# Patient Record
Sex: FEMALE | Race: WHITE | NOT HISPANIC OR LATINO | Employment: UNEMPLOYED | URBAN - METROPOLITAN AREA
[De-identification: names, ages, dates, MRNs, and addresses within clinical notes are randomized per-mention and may not be internally consistent; named-entity substitution may affect disease eponyms.]

---

## 2017-01-13 ENCOUNTER — TELEPHONE (OUTPATIENT)
Dept: INTERNAL MEDICINE | Facility: CLINIC | Age: 72
End: 2017-01-13

## 2017-01-13 NOTE — TELEPHONE ENCOUNTER
----- Message from Ju Lawler sent at 1/13/2017  2:59 PM CST -----  _  1st Request  _  2nd Request  _  3rd Request        Who: xiomara merino    Why: pt is coming down with a bad cold and congestion. Pt is calling to see if you can call her in something for her cold, that she can take with her HBP.    [facility]   Telephone Fax   [phone]        What Number to Call Back: [phone]    When to Expect a call back: (Before the end of the day)   -- if call after 3:00 call back will be tomorrow.

## 2017-01-13 NOTE — TELEPHONE ENCOUNTER
Spoke with pt states she has congestion, cough and sore throat 3 days ago, also taking tylenol, slight low grade fever on Wednesday, sputum production, advised per Dr. Mary lewis to take robitussin dm or mucinex dm

## 2017-02-08 RX ORDER — FLUTICASONE PROPIONATE 50 MCG
SPRAY, SUSPENSION (ML) NASAL
Qty: 16 G | Refills: 3 | Status: SHIPPED | OUTPATIENT
Start: 2017-02-08 | End: 2018-05-05 | Stop reason: SDUPTHER

## 2017-03-21 ENCOUNTER — TELEPHONE (OUTPATIENT)
Dept: INTERNAL MEDICINE | Facility: CLINIC | Age: 72
End: 2017-03-21

## 2017-03-21 NOTE — TELEPHONE ENCOUNTER
Pt states she received a letter for Jury Duty. Pt states she is 71yrs old and has muscle issues. Pt states in the past, Dr. León authorized a letter to be excused. Pt would like PCP to write a letter to have her excused from Jury Duty. Pt states the request is due to muscle issues/anxiety and not being able to stay seated for long periods of time. Pt states she would need the letter approved this week and she would like the letter to mailed to her home on address. Please advise/authorize?

## 2017-03-21 NOTE — TELEPHONE ENCOUNTER
----- Message from Skip Choi sent at 3/21/2017 12:03 PM CDT -----  Contact: pt  x_ 1st Request   _ 2nd Request   _ 3rd Request     Who: EDUAR SEAY [[id number]]    Why: Pt missed your call is requesting a call back    What Number to Call Back: [phone]    When to Expect a call back: (Before the end of the day)   -- if call after 3:00 call back will be tomorrow.

## 2017-03-21 NOTE — TELEPHONE ENCOUNTER
----- Message from Joan Daniels sent at 3/21/2017 10:52 AM CDT -----  Contact: Self  X   1st Request  _  2nd Request  _  3rd Request        Who:  EDUAR SEAY [[id number]]    Why: Pt states she would like to speak to the clinical team. Pt states she would like to discuss the details with clinical staff. Please call, thanks!    What Number to Call Back: [phone]    When to Expect a call back: (Before the end of the day)   -- if the call is after 12:00, the call back will be tomorrow.

## 2017-03-21 NOTE — TELEPHONE ENCOUNTER
Pt informed of letter approval. Pt letter placed in mail as requested. Patient has no further questions or concerns.

## 2017-03-23 ENCOUNTER — PATIENT OUTREACH (OUTPATIENT)
Dept: ADMINISTRATIVE | Facility: HOSPITAL | Age: 72
End: 2017-03-23

## 2017-03-31 ENCOUNTER — PATIENT OUTREACH (OUTPATIENT)
Dept: ADMINISTRATIVE | Facility: HOSPITAL | Age: 72
End: 2017-03-31

## 2017-04-07 ENCOUNTER — OFFICE VISIT (OUTPATIENT)
Dept: INTERNAL MEDICINE | Facility: CLINIC | Age: 72
End: 2017-04-07
Payer: COMMERCIAL

## 2017-04-07 VITALS
HEIGHT: 66 IN | WEIGHT: 143.31 LBS | BODY MASS INDEX: 23.03 KG/M2 | DIASTOLIC BLOOD PRESSURE: 78 MMHG | SYSTOLIC BLOOD PRESSURE: 124 MMHG | HEART RATE: 71 BPM

## 2017-04-07 DIAGNOSIS — F41.1 GENERALIZED ANXIETY DISORDER: ICD-10-CM

## 2017-04-07 DIAGNOSIS — E78.2 MIXED HYPERLIPIDEMIA: Chronic | ICD-10-CM

## 2017-04-07 DIAGNOSIS — G61.81 CHRONIC INFLAMMATORY DEMYELINATING POLYNEUROPATHY: Chronic | ICD-10-CM

## 2017-04-07 DIAGNOSIS — K21.9 GASTROESOPHAGEAL REFLUX DISEASE WITHOUT ESOPHAGITIS: ICD-10-CM

## 2017-04-07 DIAGNOSIS — I10 ESSENTIAL HYPERTENSION: Primary | Chronic | ICD-10-CM

## 2017-04-07 PROCEDURE — 3074F SYST BP LT 130 MM HG: CPT | Mod: S$GLB,,, | Performed by: INTERNAL MEDICINE

## 2017-04-07 PROCEDURE — 1160F RVW MEDS BY RX/DR IN RCRD: CPT | Mod: S$GLB,,, | Performed by: INTERNAL MEDICINE

## 2017-04-07 PROCEDURE — 3078F DIAST BP <80 MM HG: CPT | Mod: S$GLB,,, | Performed by: INTERNAL MEDICINE

## 2017-04-07 PROCEDURE — 1159F MED LIST DOCD IN RCRD: CPT | Mod: S$GLB,,, | Performed by: INTERNAL MEDICINE

## 2017-04-07 PROCEDURE — 1157F ADVNC CARE PLAN IN RCRD: CPT | Mod: S$GLB,,, | Performed by: INTERNAL MEDICINE

## 2017-04-07 PROCEDURE — 1126F AMNT PAIN NOTED NONE PRSNT: CPT | Mod: S$GLB,,, | Performed by: INTERNAL MEDICINE

## 2017-04-07 PROCEDURE — 99214 OFFICE O/P EST MOD 30 MIN: CPT | Mod: S$GLB,,, | Performed by: INTERNAL MEDICINE

## 2017-04-07 PROCEDURE — 99999 PR PBB SHADOW E&M-EST. PATIENT-LVL III: CPT | Mod: PBBFAC,,, | Performed by: INTERNAL MEDICINE

## 2017-04-07 NOTE — PROGRESS NOTES
Subjective:       Patient ID: Cecilia Munguia is a 71 y.o. female.    Chief Complaint: Annual Exam    HPI Comments: Pt here for annual exam. She feels well and has no c/o currently. 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 She has atrophy in muscles of hands and feet due to this. She sees neuro regularly. She had exacerbation earlier this year and required IVIG which helped tremendously. She does not take meds for this otherwise. She does avoid vaccinations in fear of GBS or worsening of symptoms.     She has GERD that is well controlled with prilosec otc prn. No dysphagia, wt loss, changes in BM, abd pain, n/v.     Pt's BP is well controlled. Tolerating meds well. Pt denies cp/sob/ha/vision or neuro changes. Checking at home and is well controlled.     She has mild HLD but has opted not to use statins due to CIDP. She has spoken with her neuro about this who also recommends sticking with diet/lifestyle interventions for now.      Other health maint is up to date except mammo which she will schedule with GYN.         Anxiety   Patient reports no chest pain, dizziness, palpitations or shortness of breath.         Review of Systems   Constitutional: Negative for fatigue, fever and unexpected weight change.   HENT: Negative for congestion and sore throat.    Eyes: Negative for visual disturbance.   Respiratory: Negative for shortness of breath.    Cardiovascular: Negative for chest pain, palpitations and leg swelling.   Gastrointestinal: Negative for abdominal pain, blood in stool, constipation and diarrhea.   Genitourinary: Negative for dysuria.   Musculoskeletal: Negative for arthralgias.   Skin: Negative for rash.   Neurological: Negative for dizziness, syncope and headaches.   Hematological: Negative for adenopathy.   Psychiatric/Behavioral: Negative for dysphoric mood.       Objective:      Physical Exam   Constitutional: She is  oriented to person, place, and time. She appears well-developed and well-nourished.   HENT:   Head: Normocephalic.   Right Ear: Tympanic membrane, external ear and ear canal normal.   Left Ear: Tympanic membrane, external ear and ear canal normal.   Nose: Nose normal.   Mouth/Throat: Uvula is midline and oropharynx is clear and moist.   Eyes: Conjunctivae, EOM and lids are normal. Pupils are equal, round, and reactive to light. Right conjunctiva is not injected. Left conjunctiva is not injected.   Neck: Neck supple. No JVD present. Carotid bruit is not present. No thyroid mass and no thyromegaly present.   Cardiovascular: Normal rate, regular rhythm, S1 normal, S2 normal and intact distal pulses.  PMI is not displaced.    No murmur heard.  Pulses:       Posterior tibial pulses are 2+ on the right side, and 2+ on the left side.   Pulmonary/Chest: Effort normal and breath sounds normal. She has no wheezes. She has no rhonchi. She has no rales.   Abdominal: Soft. Bowel sounds are normal. She exhibits no distension and no abdominal bruit. There is no hepatosplenomegaly. There is no tenderness.   Musculoskeletal: She exhibits no edema.   Atrophy in muscles of hands.    Lymphadenopathy:        Head (right side): No preauricular and no posterior auricular adenopathy present.        Head (left side): No preauricular and no posterior auricular adenopathy present.     She has no cervical adenopathy.     She has no axillary adenopathy.   Neurological: She is alert and oriented to person, place, and time. She has normal strength and normal reflexes. No cranial nerve deficit or sensory deficit.   Skin: Skin is warm. No rash noted.   Psychiatric: She has a normal mood and affect. Her speech is normal and behavior is normal. Judgment and thought content normal.       Assessment:       1. Essential hypertension    2. Chronic inflammatory demyelinating polyneuropathy    3. Gastroesophageal reflux disease without esophagitis    4.  Mixed hyperlipidemia    5. Generalized anxiety disorder        Plan:       1. Appropriate labs    2. Home BP monitoring  3. Keep f/u with neuro

## 2017-04-10 ENCOUNTER — LAB VISIT (OUTPATIENT)
Dept: LAB | Facility: OTHER | Age: 72
End: 2017-04-10
Attending: INTERNAL MEDICINE
Payer: COMMERCIAL

## 2017-04-10 DIAGNOSIS — E78.2 MIXED HYPERLIPIDEMIA: Chronic | ICD-10-CM

## 2017-04-10 DIAGNOSIS — I10 ESSENTIAL HYPERTENSION: Chronic | ICD-10-CM

## 2017-04-10 LAB
ALBUMIN SERPL BCP-MCNC: 3.7 G/DL
ALP SERPL-CCNC: 65 U/L
ALT SERPL W/O P-5'-P-CCNC: 11 U/L
ANION GAP SERPL CALC-SCNC: 10 MMOL/L
AST SERPL-CCNC: 17 U/L
BASOPHILS # BLD AUTO: 0.01 K/UL
BASOPHILS NFR BLD: 0.2 %
BILIRUB SERPL-MCNC: 0.9 MG/DL
BUN SERPL-MCNC: 12 MG/DL
CALCIUM SERPL-MCNC: 9.3 MG/DL
CHLORIDE SERPL-SCNC: 102 MMOL/L
CHOLEST/HDLC SERPL: 2.7 {RATIO}
CO2 SERPL-SCNC: 33 MMOL/L
CREAT SERPL-MCNC: 0.7 MG/DL
DIFFERENTIAL METHOD: ABNORMAL
EOSINOPHIL # BLD AUTO: 0.3 K/UL
EOSINOPHIL NFR BLD: 5.2 %
ERYTHROCYTE [DISTWIDTH] IN BLOOD BY AUTOMATED COUNT: 13.8 %
EST. GFR  (AFRICAN AMERICAN): >60 ML/MIN/1.73 M^2
EST. GFR  (NON AFRICAN AMERICAN): >60 ML/MIN/1.73 M^2
GLUCOSE SERPL-MCNC: 108 MG/DL
HCT VFR BLD AUTO: 45.2 %
HDL/CHOLESTEROL RATIO: 37.2 %
HDLC SERPL-MCNC: 258 MG/DL
HDLC SERPL-MCNC: 96 MG/DL
HGB BLD-MCNC: 15.2 G/DL
LDLC SERPL CALC-MCNC: 133.8 MG/DL
LYMPHOCYTES # BLD AUTO: 1.9 K/UL
LYMPHOCYTES NFR BLD: 37 %
MCH RBC QN AUTO: 31.6 PG
MCHC RBC AUTO-ENTMCNC: 33.6 %
MCV RBC AUTO: 94 FL
MONOCYTES # BLD AUTO: 0.5 K/UL
MONOCYTES NFR BLD: 9.1 %
NEUTROPHILS # BLD AUTO: 2.4 K/UL
NEUTROPHILS NFR BLD: 48.3 %
NONHDLC SERPL-MCNC: 162 MG/DL
PLATELET # BLD AUTO: 242 K/UL
PMV BLD AUTO: 9.2 FL
POTASSIUM SERPL-SCNC: 3.5 MMOL/L
PROT SERPL-MCNC: 6.8 G/DL
RBC # BLD AUTO: 4.81 M/UL
SODIUM SERPL-SCNC: 145 MMOL/L
TRIGL SERPL-MCNC: 141 MG/DL
TSH SERPL DL<=0.005 MIU/L-ACNC: 1.45 UIU/ML
WBC # BLD AUTO: 5.03 K/UL

## 2017-04-10 PROCEDURE — 85025 COMPLETE CBC W/AUTO DIFF WBC: CPT

## 2017-04-10 PROCEDURE — 80061 LIPID PANEL: CPT

## 2017-04-10 PROCEDURE — 80053 COMPREHEN METABOLIC PANEL: CPT

## 2017-04-10 PROCEDURE — 84443 ASSAY THYROID STIM HORMONE: CPT

## 2017-04-10 PROCEDURE — 36415 COLL VENOUS BLD VENIPUNCTURE: CPT

## 2017-04-10 RX ORDER — SERTRALINE HYDROCHLORIDE 50 MG/1
50 TABLET, FILM COATED ORAL DAILY
Qty: 90 TABLET | Refills: 3 | Status: SHIPPED | OUTPATIENT
Start: 2017-04-10 | End: 2018-01-16 | Stop reason: SDUPTHER

## 2017-05-02 DIAGNOSIS — I10 ESSENTIAL HYPERTENSION: ICD-10-CM

## 2017-05-02 RX ORDER — HYDROCHLOROTHIAZIDE 25 MG/1
TABLET ORAL
Qty: 90 TABLET | Refills: 3 | Status: SHIPPED | OUTPATIENT
Start: 2017-05-02 | End: 2018-04-21 | Stop reason: SDUPTHER

## 2017-05-02 NOTE — TELEPHONE ENCOUNTER
----- Message from Anita Neil sent at 5/2/2017  7:46 AM CDT -----  Contact: Patient herself  _  1st Request  X  2nd Request  _  3rd Request    Please refill the medication(s) listed below. Please call the patient when the prescription(s) is ready for  at the phone number [phone]    Medication #1  HYDRODIURIL  25 mg    Preferred Pharmacy:  [id number] - [place] - [address] [facility] [address]# [phone] / fax# [phone]

## 2017-06-20 ENCOUNTER — TELEPHONE (OUTPATIENT)
Dept: INTERNAL MEDICINE | Facility: CLINIC | Age: 72
End: 2017-06-20

## 2017-06-20 ENCOUNTER — OFFICE VISIT (OUTPATIENT)
Dept: INTERNAL MEDICINE | Facility: CLINIC | Age: 72
End: 2017-06-20
Payer: COMMERCIAL

## 2017-06-20 ENCOUNTER — HOSPITAL ENCOUNTER (OUTPATIENT)
Dept: RADIOLOGY | Facility: OTHER | Age: 72
Discharge: HOME | End: 2017-06-20
Attending: INTERNAL MEDICINE
Payer: COMMERCIAL

## 2017-06-20 VITALS
HEIGHT: 66 IN | BODY MASS INDEX: 23.24 KG/M2 | HEART RATE: 64 BPM | WEIGHT: 144.63 LBS | SYSTOLIC BLOOD PRESSURE: 122 MMHG | DIASTOLIC BLOOD PRESSURE: 82 MMHG

## 2017-06-20 DIAGNOSIS — R07.81 RIB PAIN ON RIGHT SIDE: Primary | ICD-10-CM

## 2017-06-20 DIAGNOSIS — R07.81 RIB PAIN ON RIGHT SIDE: ICD-10-CM

## 2017-06-20 PROCEDURE — 99213 OFFICE O/P EST LOW 20 MIN: CPT | Mod: S$GLB,,, | Performed by: INTERNAL MEDICINE

## 2017-06-20 PROCEDURE — 71100 X-RAY EXAM RIBS UNI 2 VIEWS: CPT | Mod: 26,,, | Performed by: RADIOLOGY

## 2017-06-20 PROCEDURE — 1159F MED LIST DOCD IN RCRD: CPT | Mod: S$GLB,,, | Performed by: INTERNAL MEDICINE

## 2017-06-20 PROCEDURE — 71100 X-RAY EXAM RIBS UNI 2 VIEWS: CPT | Mod: TC

## 2017-06-20 PROCEDURE — 1125F AMNT PAIN NOTED PAIN PRSNT: CPT | Mod: S$GLB,,, | Performed by: INTERNAL MEDICINE

## 2017-06-20 PROCEDURE — 99999 PR PBB SHADOW E&M-EST. PATIENT-LVL III: CPT | Mod: PBBFAC,,, | Performed by: INTERNAL MEDICINE

## 2017-06-20 RX ORDER — HYDROCODONE BITARTRATE AND ACETAMINOPHEN 5; 325 MG/1; MG/1
1 TABLET ORAL EVERY 8 HOURS PRN
Qty: 20 TABLET | Refills: 0 | Status: SHIPPED | OUTPATIENT
Start: 2017-06-20 | End: 2018-03-20

## 2017-06-20 NOTE — TELEPHONE ENCOUNTER
Spoke with pt regarding rib fx. She has already picked up norco to use prn. No heavy activity over next 4-6 weeks and advance as tolerated. ED prompts reiterated to pt and she understood.

## 2017-06-20 NOTE — TELEPHONE ENCOUNTER
----- Message from Ju Lawler sent at 6/20/2017 10:25 AM CDT -----  _  1st Request  _  2nd Request  _  3rd Request        Who: patient    Why: pt would like to talk to you before she schedules an appt, may be you can just call her in something, pt request. Please call pt   What Number to Call Back:[phone] or [phone]    When to Expect a call back: (Before the end of the day)   -- if the call is after 12:00, the call back will be tomorrow.

## 2017-06-20 NOTE — PROGRESS NOTES
Subjective:       Patient ID: Cecilia Munguia is a 71 y.o. female.    Chief Complaint: Fall and Chest Pain    Pt c/o pain in R lateral ribs/back that started 2 days ago after tripping and falling against sink. She had no immediate pain but woke up the following day with pain. Hurts worse with deep breaths and coughing/sneezing. Has not taken anything for it. No filiberto SOB. No bruising/rash. Did not hit head.       Review of Systems   Constitutional: Negative for fever.   Respiratory: Negative for cough and shortness of breath.    Cardiovascular: Positive for chest pain.   Psychiatric/Behavioral: Negative for dysphoric mood.       Objective:      Physical Exam   Constitutional: She is oriented to person, place, and time. She appears well-developed and well-nourished.   Neck: Neck supple. No thyromegaly present.   Cardiovascular: Normal rate, regular rhythm and normal heart sounds.    Pulmonary/Chest: Effort normal and breath sounds normal.   Tenderness mid lateral chest on R. No deformities or bruising noted   Lymphadenopathy:     She has no cervical adenopathy.   Neurological: She is alert and oriented to person, place, and time.   Psychiatric: She has a normal mood and affect. Her behavior is normal.       Assessment:       1. Rib pain on right side        Plan:       1. R rib xrays  2. ED prompts d/w pt and she understood  3. norco prn--proper use d/w pt

## 2017-10-09 ENCOUNTER — TELEPHONE (OUTPATIENT)
Dept: INTERNAL MEDICINE | Facility: CLINIC | Age: 72
End: 2017-10-09

## 2017-10-09 RX ORDER — CODEINE PHOSPHATE AND GUAIFENESIN 10; 100 MG/5ML; MG/5ML
5 SOLUTION ORAL 3 TIMES DAILY PRN
Qty: 120 ML | Refills: 0 | Status: SHIPPED | OUTPATIENT
Start: 2017-10-09 | End: 2017-10-19

## 2017-10-09 NOTE — TELEPHONE ENCOUNTER
----- Message from Ranjana Leonard sent at 10/9/2017 10:14 AM CDT -----  Contact: pt  _  1st Request  _  2nd Request  _  3rd Request        Who: pt    Why: Requesting a call back in regards to medications for a cold. She has a chest cold. Please call the pt    What Number to Call Back:[phone]    When to Expect a call back: (Within 24 hours)    Please return the call at earliest convenience. Thanks!

## 2017-10-10 NOTE — TELEPHONE ENCOUNTER
Spoke with pt advised per Dr.. Hernandez, agrees with advice given--ok to lower sertraline to 25mg and to obtain cough med from pharmacy. Pt verbalized understanding

## 2017-10-23 ENCOUNTER — TELEPHONE (OUTPATIENT)
Dept: INTERNAL MEDICINE | Facility: CLINIC | Age: 72
End: 2017-10-23

## 2017-10-23 NOTE — TELEPHONE ENCOUNTER
----- Message from Ju Lawler sent at 10/23/2017  8:05 AM CDT -----  _  1st Request  _  2nd Request  _  3rd Request        Who: patient     Why: Requesting a call back in regards to pt left foot and ankle is swollen and I gave her an appt on Wednesday but she wants to know if you could fit her in on Tuesday. Please call  Pt     What Number to Call Back: [phone]    When to Expect a call back: (Within 24 hours)    Please return the call at earliest convenience. Thanks!

## 2017-10-23 NOTE — TELEPHONE ENCOUNTER
Pt has been scheduled accordingly.  States verbal understanding.Patient has no further questions or concerns.

## 2017-10-24 ENCOUNTER — OFFICE VISIT (OUTPATIENT)
Dept: INTERNAL MEDICINE | Facility: CLINIC | Age: 72
End: 2017-10-24
Payer: COMMERCIAL

## 2017-10-24 VITALS
SYSTOLIC BLOOD PRESSURE: 124 MMHG | HEIGHT: 66 IN | DIASTOLIC BLOOD PRESSURE: 78 MMHG | HEART RATE: 63 BPM | WEIGHT: 144.38 LBS | BODY MASS INDEX: 23.2 KG/M2

## 2017-10-24 DIAGNOSIS — M79.89 SWELLING OF LEFT LOWER EXTREMITY: Primary | ICD-10-CM

## 2017-10-24 PROCEDURE — 99999 PR PBB SHADOW E&M-EST. PATIENT-LVL III: CPT | Mod: PBBFAC,,, | Performed by: INTERNAL MEDICINE

## 2017-10-24 PROCEDURE — 99213 OFFICE O/P EST LOW 20 MIN: CPT | Mod: S$GLB,,, | Performed by: INTERNAL MEDICINE

## 2017-12-21 ENCOUNTER — TELEPHONE (OUTPATIENT)
Dept: NEUROLOGY | Facility: CLINIC | Age: 72
End: 2017-12-21

## 2017-12-21 NOTE — TELEPHONE ENCOUNTER
RN spoke with patient in regard to a Letter of Medical Necessity for Physical Therapy. Patient receiving PT at [facility] ([facility]) with Dr. Yulissa Lopez. Left message for Britni Canales (Ph # [phone] & Fax# [phone]). Also, spoke with Yulissa (Ph # [phone]) regarding the therapy sessions with Mrs. Munguia. The next session is scheduled Ovi 3, 2018.

## 2017-12-26 DIAGNOSIS — G61.81 CIDP (CHRONIC INFLAMMATORY DEMYELINATING POLYNEUROPATHY): Primary | ICD-10-CM

## 2018-01-16 ENCOUNTER — PATIENT MESSAGE (OUTPATIENT)
Dept: INTERNAL MEDICINE | Facility: CLINIC | Age: 73
End: 2018-01-16

## 2018-01-16 RX ORDER — SERTRALINE HYDROCHLORIDE 50 MG/1
50 TABLET, FILM COATED ORAL DAILY
Qty: 90 TABLET | Refills: 3 | Status: SHIPPED | OUTPATIENT
Start: 2018-01-16 | End: 2018-01-18

## 2018-01-18 RX ORDER — SERTRALINE HYDROCHLORIDE 25 MG/1
25 TABLET, FILM COATED ORAL DAILY
Qty: 30 TABLET | Refills: 3 | Status: SHIPPED | OUTPATIENT
Start: 2018-01-18 | End: 2018-03-20 | Stop reason: SDUPTHER

## 2018-01-18 NOTE — TELEPHONE ENCOUNTER
Pt states she is no longer taking zoloft 50mg and request rx for zoloft 25. rx pended. Please advise/authorize?

## 2018-01-18 NOTE — TELEPHONE ENCOUNTER
----- Message from Erna Ahuja sent at 1/18/2018  3:45 PM CST -----  Contact: EDUAR SEAY [[id number]]  x_  1st Request  _  2nd Request  _  3rd Request        Who: EDUAR SEAY [[id number]]    Why: Requesting a call back in regards to her medication and she stated that she need to speak to someone today, because the prescription was incorrect. Please call her as soon as possible.     What Number to Call Back: [phone]    When to Expect a call back: (Within 24 hours)    Please return the call at earliest convenience. Thanks!

## 2018-01-18 NOTE — TELEPHONE ENCOUNTER
----- Message from Ranjana Leonard sent at 1/18/2018  9:21 AM CST -----  Contact: pt      _  1st Request  _  2nd Request  _  3rd Request    Please refill the medication(s) listed below. Please call the patient when the prescription(s) is ready for  at this phone number      [phone]    Pt wants to have it filled today. She said she requested 25 mg yesterday.    Medication #1sertraline (ZOLOFT) 50 MG tablet -wants 25 mg. Please call pt    Medication #2      [facility] [address]

## 2018-01-22 ENCOUNTER — TELEPHONE (OUTPATIENT)
Dept: INTERNAL MEDICINE | Facility: CLINIC | Age: 73
End: 2018-01-22

## 2018-01-22 NOTE — TELEPHONE ENCOUNTER
Left message for pt stating reason for call is to ensure she received the proper dosing on rx requested last week. Left contact information if patient has questions or concerns.

## 2018-03-19 ENCOUNTER — PATIENT OUTREACH (OUTPATIENT)
Dept: ADMINISTRATIVE | Facility: HOSPITAL | Age: 73
End: 2018-03-19

## 2018-03-19 NOTE — PROGRESS NOTES
[facility] is committed to your overall health.  To help you get the most out of each of your visits, we will review your information to make sure you are up to date on all of your recommended tests and/or procedures.       Your PCP  Ankur Hernandez MD   found that you may be due for:       Health Maintenance Due   Topic Date Due    TETANUS VACCINE  07/13/1965    Zoster Vaccine  07/13/2007    Pneumococcal (65+) (1 of 2 - PCV13) 07/13/2012    Mammogram  07/01/2016    DEXA SCAN  07/01/2017     Please be prepared to sign release of information so that we obtain medical records for care that you received outside of [facility]. This is done to make sure your medical record is complete and that you receive the best of care. Thank you.           If you have had any of the above done at another facility, please bring the records or information with you so that your record at [facility] will be complete.  If you would like to schedule any of these, please contact me.     If you are currently taking medication, please bring it with you to your appointment for review.     Also, if you have any type of Advanced Directives, please bring them with you to your office visit so we may scan them into your chart.       Thank you for Choosing [facility] for your healthcare needs.        Additional Information  If you have questions, you can email [email or web address] or call [phone]  to talk to our [facility] staff. Remember, [facility] is NOT to be used for urgent needs. For medical emergencies, dial 911.

## 2018-03-20 ENCOUNTER — OFFICE VISIT (OUTPATIENT)
Dept: INTERNAL MEDICINE | Facility: CLINIC | Age: 73
End: 2018-03-20
Payer: COMMERCIAL

## 2018-03-20 VITALS
SYSTOLIC BLOOD PRESSURE: 128 MMHG | HEART RATE: 61 BPM | DIASTOLIC BLOOD PRESSURE: 92 MMHG | HEIGHT: 66 IN | WEIGHT: 142.19 LBS | BODY MASS INDEX: 22.85 KG/M2

## 2018-03-20 DIAGNOSIS — I10 ESSENTIAL HYPERTENSION: Chronic | ICD-10-CM

## 2018-03-20 DIAGNOSIS — M67.432 GANGLION CYST OF DORSUM OF LEFT WRIST: ICD-10-CM

## 2018-03-20 DIAGNOSIS — E78.2 MIXED HYPERLIPIDEMIA: Chronic | ICD-10-CM

## 2018-03-20 DIAGNOSIS — F41.1 GENERALIZED ANXIETY DISORDER: ICD-10-CM

## 2018-03-20 DIAGNOSIS — K21.9 GASTROESOPHAGEAL REFLUX DISEASE WITHOUT ESOPHAGITIS: ICD-10-CM

## 2018-03-20 DIAGNOSIS — Z78.0 POSTMENOPAUSAL: ICD-10-CM

## 2018-03-20 DIAGNOSIS — G61.81 CHRONIC INFLAMMATORY DEMYELINATING POLYNEUROPATHY: Primary | Chronic | ICD-10-CM

## 2018-03-20 PROCEDURE — 99999 PR PBB SHADOW E&M-EST. PATIENT-LVL IV: CPT | Mod: PBBFAC,,, | Performed by: INTERNAL MEDICINE

## 2018-03-20 PROCEDURE — 99214 OFFICE O/P EST MOD 30 MIN: CPT | Mod: S$GLB,,, | Performed by: INTERNAL MEDICINE

## 2018-03-20 RX ORDER — SERTRALINE HYDROCHLORIDE 25 MG/1
37.5 TABLET, FILM COATED ORAL DAILY
Qty: 45 TABLET | Refills: 3
Start: 2018-03-20 | End: 2018-06-04

## 2018-03-20 NOTE — PROGRESS NOTES
Subjective:       Patient ID: Cecilia Munguia is a 72 y.o. female.    Chief Complaint: Annual Exam    Pt here for annual exam. She feels well and has no c/o currently. 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 She has atrophy in muscles of hands and feet due to this. She sees neuro regularly. She had exacerbation earlier this year and required IVIG which helped tremendously. She does not take meds for this otherwise. She does avoid vaccinations in fear of GBS or worsening of symptoms.     She has GERD that is well controlled with prilosec otc prn. No dysphagia, wt loss, changes in BM, abd pain, n/v.     Pt's BP is normally well controlled. Tolerating meds well. Pt denies cp/sob/ha/vision or neuro changes. Checking at home and is well controlled.     She has mild HLD but has opted not to use statins due to CIDP. She has spoken with her neuro about this who also recommends sticking with diet/lifestyle interventions for now.      She takes low dose zoloft for anxiety which was working well but feels she is still having some anxiety and some dysthymia. No SI/HI.     Other health maint is up to date except DEXA which she will schedule.           Anxiety   Patient reports no chest pain, dizziness, palpitations or shortness of breath.         Review of Systems   Constitutional: Negative for fatigue, fever and unexpected weight change.   HENT: Negative for congestion and sore throat.    Eyes: Negative for visual disturbance.   Respiratory: Negative for shortness of breath.    Cardiovascular: Negative for chest pain, palpitations and leg swelling.   Gastrointestinal: Negative for abdominal pain, blood in stool, constipation and diarrhea.   Genitourinary: Negative for dysuria.   Musculoskeletal: Negative for arthralgias.   Skin: Negative for rash.   Neurological: Negative for dizziness, syncope and headaches.   Hematological: Negative for  adenopathy.   Psychiatric/Behavioral: Negative for dysphoric mood.       Objective:      Physical Exam   Constitutional: She is oriented to person, place, and time. She appears well-developed and well-nourished.   HENT:   Head: Normocephalic.   Right Ear: Tympanic membrane, external ear and ear canal normal.   Left Ear: Tympanic membrane, external ear and ear canal normal.   Nose: Nose normal.   Mouth/Throat: Uvula is midline and oropharynx is clear and moist.   Eyes: Conjunctivae, EOM and lids are normal. Pupils are equal, round, and reactive to light. Right conjunctiva is not injected. Left conjunctiva is not injected.   Neck: Neck supple. No JVD present. Carotid bruit is not present. No thyroid mass and no thyromegaly present.   Cardiovascular: Normal rate, regular rhythm, S1 normal, S2 normal and intact distal pulses.  PMI is not displaced.    No murmur heard.  Pulses:       Posterior tibial pulses are 2+ on the right side, and 2+ on the left side.   Pulmonary/Chest: Effort normal and breath sounds normal. She has no wheezes. She has no rhonchi. She has no rales.   Abdominal: Soft. Bowel sounds are normal. She exhibits no distension and no abdominal bruit. There is no hepatosplenomegaly. There is no tenderness.   Musculoskeletal: She exhibits no edema.   Atrophy in muscles of hands. Cyst L dorsal wrist   Lymphadenopathy:        Head (right side): No preauricular and no posterior auricular adenopathy present.        Head (left side): No preauricular and no posterior auricular adenopathy present.     She has no cervical adenopathy.     She has no axillary adenopathy.   Neurological: She is alert and oriented to person, place, and time. She has normal strength and normal reflexes. No cranial nerve deficit or sensory deficit.   Skin: Skin is warm. No rash noted.   Psychiatric: She has a normal mood and affect. Her speech is normal and behavior is normal. Judgment and thought content normal.       Assessment:        1. Chronic inflammatory demyelinating polyneuropathy    2. Postmenopausal    3. Essential hypertension    4. Mixed hyperlipidemia    5. Gastroesophageal reflux disease without esophagitis    6. Generalized anxiety disorder    7. Ganglion cyst of dorsum of left wrist        Plan:       1. Appropriate labs    2. Home BP monitoring and f/u 2 wks nursing BP check  3. Keep f/u with neuro  4. DEXA  5. Ortho referral  6. Increase zoloft to 37.5mg daily (1.5 tabs of 25mg tabs); she will report via [facility] in 4-6 weeks

## 2018-03-22 ENCOUNTER — HOSPITAL ENCOUNTER (OUTPATIENT)
Dept: RADIOLOGY | Facility: OTHER | Age: 73
Discharge: HOME | End: 2018-03-22
Attending: INTERNAL MEDICINE
Payer: COMMERCIAL

## 2018-03-22 DIAGNOSIS — Z78.0 POSTMENOPAUSAL: ICD-10-CM

## 2018-03-22 PROCEDURE — 77080 DXA BONE DENSITY AXIAL: CPT | Mod: TC

## 2018-03-22 PROCEDURE — 77080 DXA BONE DENSITY AXIAL: CPT | Mod: 26,,, | Performed by: RADIOLOGY

## 2018-04-18 ENCOUNTER — PATIENT MESSAGE (OUTPATIENT)
Dept: INTERNAL MEDICINE | Facility: CLINIC | Age: 73
End: 2018-04-18

## 2018-04-18 ENCOUNTER — TELEPHONE (OUTPATIENT)
Dept: ORTHOPEDICS | Facility: CLINIC | Age: 73
End: 2018-04-18

## 2018-04-18 DIAGNOSIS — M25.532 LEFT WRIST PAIN: Primary | ICD-10-CM

## 2018-04-18 NOTE — TELEPHONE ENCOUNTER
Cecilia Munguia reminded of appointment on 4/19/18 with Dr. SHEY Austin w/time and location. Notified of need for xray before OV w/date, time, and location of appts.

## 2018-04-19 ENCOUNTER — OFFICE VISIT (OUTPATIENT)
Dept: ORTHOPEDICS | Facility: CLINIC | Age: 73
End: 2018-04-19
Payer: COMMERCIAL

## 2018-04-19 ENCOUNTER — HOSPITAL ENCOUNTER (OUTPATIENT)
Dept: RADIOLOGY | Facility: OTHER | Age: 73
Discharge: HOME | End: 2018-04-19
Attending: ORTHOPAEDIC SURGERY
Payer: COMMERCIAL

## 2018-04-19 VITALS
HEART RATE: 64 BPM | SYSTOLIC BLOOD PRESSURE: 128 MMHG | BODY MASS INDEX: 22.82 KG/M2 | RESPIRATION RATE: 16 BRPM | DIASTOLIC BLOOD PRESSURE: 78 MMHG | HEIGHT: 66 IN | WEIGHT: 142 LBS

## 2018-04-19 DIAGNOSIS — M19.049 CMC ARTHRITIS: ICD-10-CM

## 2018-04-19 DIAGNOSIS — M67.40 GANGLION CYST: Primary | ICD-10-CM

## 2018-04-19 DIAGNOSIS — M25.532 LEFT WRIST PAIN: ICD-10-CM

## 2018-04-19 DIAGNOSIS — G56.20 LESION OF ULNAR NERVE, UNSPECIFIED LATERALITY: ICD-10-CM

## 2018-04-19 PROCEDURE — 73110 X-RAY EXAM OF WRIST: CPT | Mod: TC,FY,LT

## 2018-04-19 PROCEDURE — 73110 X-RAY EXAM OF WRIST: CPT | Mod: 26,LT,, | Performed by: RADIOLOGY

## 2018-04-19 PROCEDURE — 99204 OFFICE O/P NEW MOD 45 MIN: CPT | Mod: S$GLB,,, | Performed by: ORTHOPAEDIC SURGERY

## 2018-04-19 PROCEDURE — 99999 PR PBB SHADOW E&M-EST. PATIENT-LVL III: CPT | Mod: PBBFAC,,, | Performed by: ORTHOPAEDIC SURGERY

## 2018-04-19 NOTE — LETTER
April 22, 2018      Ankur Hernandez MD  [address]  [facility] [address]           [facility]  [address], [address]  [facility] [address]  Phone: [phone]          Patient: Cecilia Munguia   MR Number: [id number]   YOB: 1945   Date of Visit: 4/19/2018       Dear Dr. Ankur Hernandez:    Thank you for referring Cecilia Munguia to me for evaluation. Attached you will find relevant portions of my assessment and plan of care.    If you have questions, please do not hesitate to call me. I look forward to following Cecilia Munguia along with you.    Sincerely,        Enclosure  CC:  No Recipients    If you would like to receive this communication electronically, please contact [email or web address] or [phone] to request more information on [employer] access.    For providers and/or their staff who would like to refer a patient to [facility], please contact us through our one-stop-shop provider referral line, [facility], at [phone].    If you feel you have received this communication in error or would no longer like to receive these types of communications, please e-mail [email or web address]

## 2018-04-21 DIAGNOSIS — I10 ESSENTIAL HYPERTENSION: ICD-10-CM

## 2018-04-22 RX ORDER — HYDROCHLOROTHIAZIDE 25 MG/1
TABLET ORAL
Qty: 90 TABLET | Refills: 3 | Status: SHIPPED | OUTPATIENT
Start: 2018-04-22 | End: 2019-04-05 | Stop reason: SDUPTHER

## 2018-05-02 NOTE — PROGRESS NOTES
CHIEF COMPLAINT:  Ganglion cyst of left wrist.    HISTORY OF PRESENT ILLNESS:  Ms. Munguia is a 72-year-old female who complains   of a volar ganglion on her left wrist.  She states it has been there for three   months.  It is not very painful.  She also complains of nodules on her fingers,   which look like Heberden nodes.  She did not get hand x-rays today.  She does   smoke.  She does complain of weakness.  She also has pain at the base of her   thumb.  She has not done anything for it.    PAST MEDICAL HISTORY, SOCIAL HISTORY, SURGICAL HISTORY, REVIEW OF SYSTEMS:  Per   electronic medical record.    PHYSICAL EXAMINATION:  VITAL SIGNS:  Please see computer entry.  GENERAL:  Alert and oriented x3, well developed, well nourished, no apparent   distress, friendly individual, well groomed, appears stated age.  Gait is normal   and nonantalgic.  EXTREMITIES:  On examination of bilateral upper extremities, median, radial,   ulnar, anterior interosseous, posterior interosseous, motor and sensation to   light touch intact.  Brisk capillary refill.  On examination of her hand, she   does have significant interosseous wasting.  Tender to palpation, specifically   at the left thumb base and she does have a volar ganglion.    PLAN:  After much discussion with conservative treatment for CMC osteoarthritis   as well as surgical treatment for the volar ganglion and discussing Heberden   nodes that are present in her hands and her interosseous wasting, the patient   states at this time she cannot undergo any kind of surgical procedure.  She   wants to wait on this.  She will watch the volar ganglion.  She understands what   Heberden nodes are and she will start conservative treatment for her CMC   arthritis.      LES/HN  dd: 05/01/2018 09:00:02 (CDT)  td: 05/02/2018 02:34:03 (CDT)  Doc ID   #[id number]  Job ID #[id number]    CC:

## 2018-05-06 RX ORDER — FLUTICASONE PROPIONATE 50 MCG
SPRAY, SUSPENSION (ML) NASAL
Qty: 16 G | Refills: 2 | Status: SHIPPED | OUTPATIENT
Start: 2018-05-06 | End: 2019-06-12 | Stop reason: SDUPTHER

## 2018-05-18 DIAGNOSIS — M25.552 PAIN OF BOTH HIP JOINTS: Primary | ICD-10-CM

## 2018-05-18 DIAGNOSIS — M25.551 PAIN OF BOTH HIP JOINTS: Primary | ICD-10-CM

## 2018-06-04 ENCOUNTER — HOSPITAL ENCOUNTER (OUTPATIENT)
Dept: RADIOLOGY | Facility: HOSPITAL | Age: 73
Discharge: HOME | End: 2018-06-04
Attending: ORTHOPAEDIC SURGERY
Payer: COMMERCIAL

## 2018-06-04 ENCOUNTER — OFFICE VISIT (OUTPATIENT)
Dept: ORTHOPEDICS | Facility: CLINIC | Age: 73
End: 2018-06-04
Payer: COMMERCIAL

## 2018-06-04 VITALS
DIASTOLIC BLOOD PRESSURE: 85 MMHG | SYSTOLIC BLOOD PRESSURE: 130 MMHG | HEIGHT: 66 IN | BODY MASS INDEX: 22.53 KG/M2 | WEIGHT: 140.19 LBS | HEART RATE: 62 BPM

## 2018-06-04 DIAGNOSIS — M25.551 PAIN OF BOTH HIP JOINTS: ICD-10-CM

## 2018-06-04 DIAGNOSIS — M25.552 PAIN OF BOTH HIP JOINTS: ICD-10-CM

## 2018-06-04 DIAGNOSIS — M70.62 GREATER TROCHANTERIC BURSITIS OF BOTH HIPS: ICD-10-CM

## 2018-06-04 DIAGNOSIS — M16.0 PRIMARY OSTEOARTHRITIS OF BOTH HIPS: Primary | ICD-10-CM

## 2018-06-04 DIAGNOSIS — M70.61 GREATER TROCHANTERIC BURSITIS OF BOTH HIPS: ICD-10-CM

## 2018-06-04 PROCEDURE — 73521 X-RAY EXAM HIPS BI 2 VIEWS: CPT | Mod: TC

## 2018-06-04 PROCEDURE — 73521 X-RAY EXAM HIPS BI 2 VIEWS: CPT | Mod: 26,,, | Performed by: RADIOLOGY

## 2018-06-04 PROCEDURE — 99203 OFFICE O/P NEW LOW 30 MIN: CPT | Mod: 25,S$GLB,, | Performed by: ORTHOPAEDIC SURGERY

## 2018-06-04 PROCEDURE — 20610 DRAIN/INJ JOINT/BURSA W/O US: CPT | Mod: LT,S$GLB,, | Performed by: ORTHOPAEDIC SURGERY

## 2018-06-04 PROCEDURE — 99999 PR PBB SHADOW E&M-EST. PATIENT-LVL III: CPT | Mod: PBBFAC,,, | Performed by: ORTHOPAEDIC SURGERY

## 2018-06-04 RX ORDER — TRIAMCINOLONE ACETONIDE 40 MG/ML
40 INJECTION, SUSPENSION INTRA-ARTICULAR; INTRAMUSCULAR
Status: COMPLETED | OUTPATIENT
Start: 2018-06-04 | End: 2018-06-04

## 2018-06-04 RX ADMIN — TRIAMCINOLONE ACETONIDE 40 MG: 40 INJECTION, SUSPENSION INTRA-ARTICULAR; INTRAMUSCULAR at 11:06

## 2018-06-04 NOTE — PROGRESS NOTES
Subjective:      Patient ID: Cecilia Munguia is a 72 y.o. female.    Chief Complaint: Pain of the Left Hip and Pain of the Right Hip    HPI    Cecilia Munguia is a 72 year old female here with a 3 year history of bilateral hip pain. She has a history of Chronic Inflammatory Demyelinating Polyneuropathy (CIPD), treated in the past with IVIg.  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  The pain is moderate The pain is located in the lateral thighs mostly.  She also has some occasional medial thigh pain.  There is  radiation.  The pain radaites to the knee.  The pain is described as achy. The patient has not had prior surgery. It is aggravated by prolonged sitting, walking.  It  is alleviated by rest.  She has chronic weakness in ankles and muscle atrophy.  There is not numbness or tingling of the lower extremity.  There is back pain.  She  has not tried medications or injections.  She does not have difficulty getting in or out of a car, getting dressed, or going up or down stairs.  The patient does not use an assistive device.    Past Medical History:   Diagnosis Date    Anxiety     Chronic inflammatory demyelinating neuropathy     Depression     GERD (gastroesophageal reflux disease)     Hyperlipidemia     Hypertension     Neuromuscular disorder     demyelinating neuropathy    Ulcer     several years ago       Current Outpatient Prescriptions on File Prior to Visit   Medication Sig Dispense Refill    fluticasone (FLONASE) 50 mcg/actuation nasal spray USE 2 SPRAYS IN EACH NOSTRIL DAILY. 16 g 2    hydroCHLOROthiazide (HYDRODIURIL) 25 MG tablet TAKE 1 TABLET (25 MG TOTAL) BY MOUTH ONCE DAILY. 90 tablet 3    omeprazole (PRILOSEC) 20 MG capsule Take 20 mg by mouth Daily.       simethicone (MYLICON) 80 MG chewable tablet Take 80 mg by mouth every 6 (six) hours as needed for Flatulence.      [DISCONTINUED] sertraline (ZOLOFT) 25 MG tablet Take 1.5 tablets (37.5 mg total) by mouth once daily.  45 tablet 3     No current facility-administered medications on file prior to visit.        Past Surgical History:   Procedure Laterality Date     SECTION      COSMETIC SURGERY      TONSILLECTOMY         Family History   Problem Relation Age of Onset    Diabetes Mother     Hypertension Father     Heart disease Father     Diabetes Brother     Heart attack Brother     Heart disease Brother        Social History     Social History    Marital status:      Spouse name: N/A    Number of children: N/A    Years of education: N/A     Occupational History    Not on file.     Social History Main Topics    Smoking status: Former Smoker     Quit date: 2014    Smokeless tobacco: Never Used      Comment: 1st [id number];quit again 2014    Alcohol use Yes    Drug use: No    Sexual activity: Yes     Partners: Male     Other Topics Concern    Not on file     Social History Narrative    No narrative on file         Review of Systems   Constitution: Negative for chills, fever and night sweats.   HENT: Negative for hearing loss.    Eyes: Negative for blurred vision, double vision and pain.   Cardiovascular: Negative for chest pain, claudication, dyspnea on exertion and leg swelling.   Respiratory: Negative for cough and shortness of breath.    Endocrine: Negative for cold intolerance, heat intolerance, polydipsia, polyphagia and polyuria.   Hematologic/Lymphatic: Negative for adenopathy and bleeding problem. Does not bruise/bleed easily.   Skin: Negative for poor wound healing.   Musculoskeletal: Positive for joint pain.   Gastrointestinal: Negative for abdominal pain, change in bowel habit, diarrhea and heartburn.   Genitourinary: Negative for bladder incontinence and dysuria.   Neurological: Negative for focal weakness, headaches, numbness, paresthesias and sensory change.   Psychiatric/Behavioral: Negative for altered mental status, depression and hallucinations. The patient is not  nervous/anxious.    Allergic/Immunologic: Negative for persistent infections.         Objective:            General    Vitals reviewed.  Constitutional: She is oriented to person, place, and time. She appears well-developed and well-nourished.   HENT:   Head: Normocephalic and atraumatic.   Eyes: Conjunctivae and EOM are normal.   Neck: Normal range of motion.   Cardiovascular: Normal rate and intact distal pulses.    Pulmonary/Chest: Effort normal. No respiratory distress.   Abdominal: She exhibits no distension.   Neurological: She is alert and oriented to person, place, and time.   Hand intrinsic and leg atrophy   Psychiatric: She has a normal mood and affect. Her behavior is normal.     General Musculoskeletal Exam   Gait: abnormal   Pelvic Obliquity: none    Right Ankle/Foot Exam     Comments:  Generalized leg atrophy    Left Ankle/Foot Exam     Comments:  Generalized leg atrophy    Right Knee Exam     Inspection   Alignment:  normal  Effusion: effusion    Left Knee Exam     Inspection   Alignment:  normal  Effusion: absent    Right Hip Exam     Inspection   Scars: absent  Swelling: absent  Bruising: absent  No deformity of hip.  Quadriceps Atrophy:  Negative  Erythema: absent    Tenderness   The patient tender to palpation of the trochanteric bursa.    Range of Motion   Extension: 10   Flexion: 100   Internal Rotation: 15   External Rotation: 30   Abduction: 30   Adduction: 20     Tests   Pain w/ forced internal rotation (BRIGIDO): present  Stinchfield test: negative    Other   Sensation: normal  Left Hip Exam     Inspection   Scars: absent  Swelling: absent  No deformity of hip.  Quadriceps Atrophy:  negative  Erythema: absent  Bruising: absent    Tenderness   The patient tender to palpation of the trochanteric bursa.    Range of Motion   Extension: 10   Flexion: 100   Internal Rotation: 15   External Rotation: 30   Abduction: 35   Adduction: 20     Tests   Pain w/ forced internal rotation (BRIGIDO):  present  [facility] test: negative    Other   Sensation: normal      Back (L-Spine & T-Spine) / Neck (C-Spine) Exam   Back exam is normal.    Back (L-Spine & T-Spine) Tests   Right Side Tests  Right straight leg raise test: neg.  Left Side Tests  Left straight leg raise test: neg.      Muscle Strength   Right Lower Extremity   Hip Abduction: 5/5   Hip Adduction: 5/5   Hip Flexion: 5/5   Ankle Dorsiflexion:  4/5   EHL:  3/5  Left Lower Extremity   Hip Abduction: 5/5   Hip Adduction: 5/5   Hip Flexion: 5/5   Ankle Dorsiflexion:  4/5   EHL:  3/5    Reflexes     Left Side  Quadriceps:  0  Achilles:  0    Right Side   Quadriceps:  0  Achilles:  0    Vascular Exam     Right Pulses  Dorsalis Pedis:      2+          Left Pulses  Dorsalis Pedis:      2+          Capillary Refill  Right Hand: normal capillary refill  Left Hand: normal capillary refill    Edema  Right Upper Leg: absent  Left Upper Leg: absent        Radiographs taken today and reviewed by me demonstrate severe arthritic change of the bilateral hip(s).There  is not bone destruction.  There is not a fracture.        Assessment:       Encounter Diagnoses   Name Primary?    Primary osteoarthritis of both hips Yes    Greater trochanteric bursitis of both hips           Plan:       Cecilia was seen today for pain and pain.    Diagnoses and all orders for this visit:    Primary osteoarthritis of both hips    Greater trochanteric bursitis of both hips    Other orders  -     triamcinolone acetonide injection 40 mg; Inject 1 mL (40 mg total) into the articular space one time.        Treatment options were discussed. Verbal consent was obtained.  After time out was performed and patient ID, side, and site were verified, the left hip  was sterilely prepped in the standard fashion.  A 22-gauge needle was introduced into the  trochanteric bursa without complication.The bursa was then injected with 40 mg Kenalog and 9 cc 1% plain lidocaine.  Sterile dressing was applied.   The patient was informed that they may resume activities as tolerated.  Elevation of glucose in diabetic patients was discussed.    F/U in 6 weeks

## 2018-06-04 NOTE — LETTER
June 4, 2018      Elyssa Austin MD  [address]  [address]  [facility] [address]           [facility]  [address]  [facility] [address]  Phone: [phone]          Patient: Cecilia Munguia   MR Number: [id number]   YOB: 1945   Date of Visit: 6/4/2018       Dear Dr. Elyssa Austin:    Thank you for referring Cecilia Munguia to me for evaluation. Attached you will find relevant portions of my assessment and plan of care.    If you have questions, please do not hesitate to call me. I look forward to following Cecilia Munguia along with you.    Sincerely,    Parker Lopez MD    Enclosure  CC:  No Recipients    If you would like to receive this communication electronically, please contact [email or web address] or [phone] to request more information on [employer] access.    For providers and/or their staff who would like to refer a patient to [facility], please contact us through our one-stop-shop provider referral line, [facility], at [phone].    If you feel you have received this communication in error or would no longer like to receive these types of communications, please e-mail [email or web address]

## 2018-12-14 ENCOUNTER — TELEPHONE (OUTPATIENT)
Dept: ORTHOPEDICS | Facility: CLINIC | Age: 73
End: 2018-12-14

## 2018-12-14 NOTE — TELEPHONE ENCOUNTER
Spoke to pt, scheduled appointment with Kwame next week for R hip pain. Pt requests injection to help her get thru the holidays. Informed pt that Zena GAMBOA will determine if she gets an injection during the appointment. Scheduled appointment in Feb with Dr. Lopez per pt request. Pt pleased and verbalized understanding. appt slip mailed.

## 2018-12-18 ENCOUNTER — OFFICE VISIT (OUTPATIENT)
Dept: ORTHOPEDICS | Facility: CLINIC | Age: 73
End: 2018-12-18
Payer: COMMERCIAL

## 2018-12-18 VITALS
SYSTOLIC BLOOD PRESSURE: 141 MMHG | WEIGHT: 136.44 LBS | BODY MASS INDEX: 21.93 KG/M2 | DIASTOLIC BLOOD PRESSURE: 87 MMHG | HEIGHT: 66 IN | HEART RATE: 70 BPM

## 2018-12-18 DIAGNOSIS — M70.61 GREATER TROCHANTERIC BURSITIS OF BOTH HIPS: Primary | ICD-10-CM

## 2018-12-18 DIAGNOSIS — M25.551 RIGHT HIP PAIN: ICD-10-CM

## 2018-12-18 DIAGNOSIS — M70.62 GREATER TROCHANTERIC BURSITIS OF BOTH HIPS: Primary | ICD-10-CM

## 2018-12-18 PROCEDURE — 99999 PR PBB SHADOW E&M-EST. PATIENT-LVL III: CPT | Mod: PBBFAC,,, | Performed by: PHYSICIAN ASSISTANT

## 2018-12-18 PROCEDURE — 20610 DRAIN/INJ JOINT/BURSA W/O US: CPT | Mod: RT,S$GLB,, | Performed by: PHYSICIAN ASSISTANT

## 2018-12-18 PROCEDURE — 99213 OFFICE O/P EST LOW 20 MIN: CPT | Mod: 25,S$GLB,, | Performed by: PHYSICIAN ASSISTANT

## 2018-12-18 RX ORDER — TRIAMCINOLONE ACETONIDE 40 MG/ML
60 INJECTION, SUSPENSION INTRA-ARTICULAR; INTRAMUSCULAR
Status: COMPLETED | OUTPATIENT
Start: 2018-12-18 | End: 2018-12-18

## 2018-12-18 RX ADMIN — TRIAMCINOLONE ACETONIDE 60 MG: 40 INJECTION, SUSPENSION INTRA-ARTICULAR; INTRAMUSCULAR at 02:12

## 2018-12-18 NOTE — PROGRESS NOTES
"Subjective:      Patient ID: Cecilia Munguia is a 73 y.o. female.    Chief Complaint: Pain of the right Hip    HPI    Cecilia Munguia is a 73 year old female here with a 3 year history of bilateral hip pain. She has a history of Chronic Inflammatory Demyelinating Polyneuropathy (CIPD), treated in the past with IVIg.  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  The pain is moderate The pain is located in the lateral thighs mostly.  She also has some occasional medial thigh pain.  There is  radiation.  The pain radaites to the knee.  The pain is described as achy. The patient has not had prior surgery. It is aggravated by prolonged sitting, walking.  It  is alleviated by rest.  She has chronic weakness in ankles and muscle atrophy.  There is not numbness or tingling of the lower extremity.  There is back pain.   She does not have difficulty getting in or out of a car, getting dressed, or going up or down stairs.  The patient does not use an assistive device.  She had left GT bursa injection by Dr. Lopez 6/4/2018 which helped with the pain in the left hip. Pain is now in the right hip. She is working with a .    Past Medical History:   Diagnosis Date    Anxiety     Chronic inflammatory demyelinating neuropathy     Depression     GERD (gastroesophageal reflux disease)     Hyperlipidemia     Hypertension     Neuromuscular disorder     demyelinating neuropathy    Ulcer     several years ago         Review of Systems   Constitution: Negative for chills, fever and night sweats.   HENT: Negative for hearing loss.    Eyes: Negative for blurred vision, double vision and pain.   Cardiovascular: Negative for chest pain, claudication, dyspnea on exertion and leg swelling.   Respiratory: Negative for cough and shortness of breath.          Objective:      BP (!) 141/87 (BP Location: Left arm, Patient Position: Sitting, BP Method: Medium (Automatic))   Pulse 70   Ht 5' 6" (1.676 m)   " Wt 61.9 kg (136 lb 7.4 oz)   BMI 22.03 kg/m²      General  Constitutional: She is oriented to person, place, and time. She appears well-developed and well-nourished.   HENT:   Head: Normocephalic and atraumatic.   Eyes: Conjunctivae and EOM are normal.   Neck: Normal range of motion.   Cardiovascular: Normal rate and intact distal pulses.    Pulmonary/Chest: Effort normal. No respiratory distress.   Abdominal: She exhibits no distension.   Neurological: She is alert and oriented to person, place, and time.   Hand intrinsic and leg atrophy   Psychiatric: She has a normal mood and affect. Her behavior is normal.     General Musculoskeletal Exam   Gait: abnormal   Pelvic Obliquity: none    Right Ankle/Foot Exam     Comments:  Generalized leg atrophy    Left Ankle/Foot Exam     Comments:  Generalized leg atrophy    Right Knee Exam     Inspection   Alignment:  normal  Effusion: effusion    Left Knee Exam     Inspection   Alignment:  normal  Effusion: absent    Right Hip Exam     Inspection   Scars: absent  Swelling: absent  Bruising: absent  No deformity of hip.  Quadriceps Atrophy:  Negative  Erythema: absent    Tenderness   The patient tender to palpation of the trochanteric bursa.    Range of Motion   Extension: 10   Flexion: 100   Internal Rotation: 15   External Rotation: 30   Abduction: 30   Adduction: 20     Tests   Pain w/ forced internal rotation (BRIGIDO): present  Stinchfield test: negative    Other   Sensation: normal  Left Hip Exam     Inspection   Scars: absent  Swelling: absent  No deformity of hip.  Quadriceps Atrophy:  negative  Erythema: absent  Bruising: absent    Tenderness   The patient tender to palpation of the trochanteric bursa.    Range of Motion   Extension: 10   Flexion: 100   Internal Rotation: 15   External Rotation: 30   Abduction: 35   Adduction: 20     Tests   Pain w/ forced internal rotation (BRIGIDO): present  Stinchfield test: negative    Other   Sensation: normal      Back (L-Spine &  T-Spine) / Neck (C-Spine) Exam   Back exam is normal.    Back (L-Spine & T-Spine) Tests   Right Side Tests  Right straight leg raise test: neg.  Left Side Tests  Left straight leg raise test: neg.    Radiographs reviewed and demonstrate severe arthritic change of the bilateral hip.        Assessment:     Primary osteoarthritis both hips  Greater trochanteric bursitis right hip      Plan:   Inject R GT bursa today  Discussed total hip arthroplasty  Pt already has appt scheduled in Feb with Dr. Lopez. She will keep this appt. She will call sooner if symptoms worsen.    Greater Trochanter Bursa Injection Procedure Note   Diagnosis: right greater trochanteric bursitis  Indications: right hip pain  Procedure Details: Verbal consent was obtained for the procedure. The injection site was identified and the skin was prepared with alcohol. The right hip was injected from a lateral approach with 1.5 ml of Kenalog and 1.5 ml Lidocaine under sterile technique using a 25 gauge 1 1/2 inch needle. The needle was removed and the area cleansed and dressed.  Complications:  Patient tolerated the procedure well.    she was advised to rest the leg today, using ice and Tylenol as needed for comfort and swelling. she may have an increase in discomfort tonight followed by steady improvement over the next several days. It may take 1-3 weeks following the injection to get the full benefit of the medication.

## 2019-01-03 ENCOUNTER — OFFICE VISIT (OUTPATIENT)
Dept: INTERNAL MEDICINE | Facility: CLINIC | Age: 74
End: 2019-01-03
Attending: FAMILY MEDICINE
Payer: COMMERCIAL

## 2019-01-03 VITALS
BODY MASS INDEX: 21.79 KG/M2 | SYSTOLIC BLOOD PRESSURE: 132 MMHG | HEIGHT: 66 IN | WEIGHT: 135.56 LBS | HEART RATE: 63 BPM | DIASTOLIC BLOOD PRESSURE: 84 MMHG | OXYGEN SATURATION: 97 %

## 2019-01-03 DIAGNOSIS — E78.2 MIXED HYPERLIPIDEMIA: Chronic | ICD-10-CM

## 2019-01-03 DIAGNOSIS — J40 BRONCHITIS: ICD-10-CM

## 2019-01-03 DIAGNOSIS — I10 ESSENTIAL HYPERTENSION: Primary | Chronic | ICD-10-CM

## 2019-01-03 PROCEDURE — 99214 OFFICE O/P EST MOD 30 MIN: CPT | Mod: S$GLB,,, | Performed by: FAMILY MEDICINE

## 2019-01-03 PROCEDURE — 99999 PR PBB SHADOW E&M-EST. PATIENT-LVL III: ICD-10-PCS | Mod: PBBFAC,,, | Performed by: FAMILY MEDICINE

## 2019-01-03 PROCEDURE — 99214 PR OFFICE/OUTPT VISIT, EST, LEVL IV, 30-39 MIN: ICD-10-PCS | Mod: S$GLB,,, | Performed by: FAMILY MEDICINE

## 2019-01-03 PROCEDURE — 99999 PR PBB SHADOW E&M-EST. PATIENT-LVL III: CPT | Mod: PBBFAC,,, | Performed by: FAMILY MEDICINE

## 2019-01-03 RX ORDER — PROMETHAZINE HYDROCHLORIDE AND DEXTROMETHORPHAN HYDROBROMIDE 6.25; 15 MG/5ML; MG/5ML
5 SYRUP ORAL EVERY 4 HOURS PRN
Qty: 120 ML | Refills: 0 | Status: SHIPPED | OUTPATIENT
Start: 2019-01-03 | End: 2019-01-16

## 2019-01-03 RX ORDER — AZITHROMYCIN 500 MG/1
500 TABLET, FILM COATED ORAL DAILY
Qty: 5 TABLET | Refills: 0 | Status: SHIPPED | OUTPATIENT
Start: 2019-01-03 | End: 2019-01-08

## 2019-01-03 NOTE — PROGRESS NOTES
CHIEF COMPLAINT: The patient presents with 10 days of cough and low-grade fever    HISTORY OF PRESENT ILLNESS: The patient presents with 10 days of cough and low-grade fever.  The patient has some chest congestion.  There is no history of asthma.  There is no history of HIV.  There is no history of frequent respiratory infections.    The patient has a history of stable hypertension on current medications.  Patient denies chest pain or shortness of breath today.    The patient has a history of stable hyperlipidemia on current medications.  The patient denies chest pain or shortness of breath today.  The patient denies muscle aches or myalgias suggestive of myositis.    REVIEW OF SYSTEMS:  GENERAL: No fatigability or weight loss.  SKIN: No rashes, itching or changes in color or texture of skin.  HEAD: No headaches or recent head trauma.  EYES: Visual acuity fine. No photophobia, ocular pain or diplopia.  EARS: Denies ear pain, discharge or vertigo.  NOSE: No loss of smell, no epistaxis or postnasal drip.  MOUTH & THROAT: No hoarseness or change in voice. No excessive gum bleeding.  NODES: Denies swollen glands.  CHEST: Denies MONTOYA, cyanosis, wheezing, and sputum production.  CARDIOVASCULAR: Denies chest pain, PND, orthopnea or reduced exercise tolerance.  ABDOMEN: Appetite fine. No weight loss. Denies diarrhea, abdominal pain, hematemesis or blood in stool.  URINARY: No flank pain, dysuria or hematuria.  PERIPHERAL VASCULAR: No claudication or cyanosis.  MUSCULOSKELETAL: No joint stiffness or swelling. Denies back pain.  NEUROLOGIC: No history of seizures, paralysis, alteration of gait or coordination.    MEDICATIONS: The patient's MedCard has been reviewed and reconciled.     ALLERGIES: The patients' Allergy Card has been reviewed and reconciled.    PAST MEDICAL HISTORY: Unchanged since recent note    SOCIAL HISTORY: Unchanged since recent note    PHYSICAL EXAMINATION:   @vitals   APPEARANCE: Well nourished, well  developed, in no acute distress.    HEAD: Normocephalic, atraumatic.  EYES: PERRL. EOMI.  Conjunctivae without injection and  anicteric  EARS: TM's intact. Light reflex normal. No retraction or perforation.    NOSE: Mucosa pink. Airway clear.  MOUTH & THROAT: No tonsillar enlargement. No pharyngeal erythema or exudate. No stridor.  NECK: Supple.   NODES: No cervical, axillary or inguinal lymph node enlargement.  CHEST: Lungs demonstrate scattered mild wheezes bilaterally.  No retractions are noted.  No rales or rhonchi are present.  CARDIOVASCULAR: Normal S1, S2. No rubs, murmurs or gallops.  ABDOMEN: Bowel sounds normal. Not distended. Soft. No tenderness or masses.  No ascites is noted.  MUSCULOSKELETAL:  There is no clubbing, cyanosis, or edema of the extremities x4.  There is full range of motion of the lumbar spine.  There is full range of motion of the extremities x4.  There is no deformity noted.    NEUROLOGIC:       Normal speech development.      Hearing normal.      Normal gait.      Motor and sensory exams grossly normal.      DTR's normal.  PSYCHIATRIC: Patient is alert and oriented x3.  Thought processes are all normal.  There is no homicidality.  There is no suicidality.  There is no evidence of psychosis.    LABORATORY/RADIOLOGY: Chart reviewed.    ASSESSMENT:   Hypertension, condition is well controlled on current medication regimen  Hyperlipidemia, condition is well controlled on current medication regimen  Acute bronchitis  Cough (productive) 10 days    PLAN:  Zithromax and Phenergan DM    Patient to call with failure to improve.

## 2019-01-15 ENCOUNTER — TELEPHONE (OUTPATIENT)
Dept: INTERNAL MEDICINE | Facility: CLINIC | Age: 74
End: 2019-01-15

## 2019-01-15 NOTE — PROGRESS NOTES
Subjective:       Patient ID: Cecilia Munguia is a 73 y.o. female.    Chief Complaint: Annual Exam    Pt here for f/u. 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 She has atrophy in muscles of hands and feet due to this. She sees neuro regularly. She has had intermittent exacerbations and required IVIG which helped tremendously. She does not take meds for this otherwise. She does avoid vaccinations in fear of GBS or worsening of symptoms.     She has GERD that is well controlled with prilosec otc prn. No dysphagia, wt loss, changes in BM, abd pain, n/v.     Pt's BP is normally well controlled. Tolerating meds well. Pt denies cp/sob/ha/vision or neuro changes. Checking at home and is well controlled.     She has mild HLD but has opted not to use statins due to CIDP. She has spoken with her neuro about this who also recommends sticking with diet/lifestyle interventions for now.      She is no longer on zoloft for anxiety and is doing well. No SI/HI.     She will be having R hip replacement in the next couple of months with Dr. Lynn at [facility]. She is active and able to achieve >4 METS without cp/sob/angina. She has tolerated anesthesia in the past without known major complications.     She reports urge and stress urinary incontinence for the last several months. No dysyuria/hematuria/vaginal symptoms. Finds it worse when sneezing/laughing but also difficult to make it to bathroom in time.    Other health maint is up to date.          Anxiety   Patient reports no chest pain, dizziness, palpitations or shortness of breath.         Review of Systems   Constitutional: Negative for fatigue, fever and unexpected weight change.   HENT: Negative for congestion and sore throat.    Eyes: Negative for visual disturbance.   Respiratory: Negative for shortness of breath.    Cardiovascular: Negative for chest pain, palpitations and leg  swelling.   Gastrointestinal: Negative for abdominal pain, blood in stool, constipation and diarrhea.   Genitourinary: Negative for dysuria.   Musculoskeletal: Negative for arthralgias.   Skin: Negative for rash.   Neurological: Negative for dizziness, syncope and headaches.   Hematological: Negative for adenopathy.   Psychiatric/Behavioral: Negative for dysphoric mood.       Objective:      Physical Exam   Constitutional: She is oriented to person, place, and time. She appears well-developed and well-nourished.   HENT:   Head: Normocephalic.   Right Ear: Tympanic membrane, external ear and ear canal normal.   Left Ear: Tympanic membrane, external ear and ear canal normal.   Nose: Nose normal.   Mouth/Throat: Uvula is midline and oropharynx is clear and moist.   Eyes: Conjunctivae, EOM and lids are normal. Pupils are equal, round, and reactive to light. Right conjunctiva is not injected. Left conjunctiva is not injected.   Neck: Neck supple. No JVD present. Carotid bruit is not present. No thyroid mass and no thyromegaly present.   Cardiovascular: Normal rate, regular rhythm, S1 normal, S2 normal and intact distal pulses. PMI is not displaced.   No murmur heard.  Pulses:       Posterior tibial pulses are 2+ on the right side, and 2+ on the left side.   Pulmonary/Chest: Effort normal and breath sounds normal. She has no wheezes. She has no rhonchi. She has no rales.   Abdominal: Soft. Bowel sounds are normal. She exhibits no distension and no abdominal bruit. There is no hepatosplenomegaly. There is no tenderness.   Musculoskeletal: She exhibits no edema.   Atrophy in muscles of hands. Cyst L dorsal wrist   Lymphadenopathy:        Head (right side): No preauricular and no posterior auricular adenopathy present.        Head (left side): No preauricular and no posterior auricular adenopathy present.     She has no cervical adenopathy.     She has no axillary adenopathy.   Neurological: She is alert and oriented to  person, place, and time. She has normal strength and normal reflexes. No cranial nerve deficit or sensory deficit.   Skin: Skin is warm. No rash noted.   Psychiatric: She has a normal mood and affect. Her speech is normal and behavior is normal. Judgment and thought content normal.       Assessment:       1. Chronic inflammatory demyelinating polyneuropathy    2. Peripheral neuropathy, idiopathic    3. Generalized anxiety disorder    4. Essential hypertension    5. Mixed hyperlipidemia    6. Gastroesophageal reflux disease without esophagitis    7. Mixed stress and urge urinary incontinence        Plan:       1. Appropriate labs    2. Home BP monitoring   3. Keep f/u with neuro  4. EKG  5. Pending labs and EKG, pt is low risk to proceed with R HAYLEY as RCRI is 0.4% and has no known active cardiopulmonary issues; her chronic conditions are well compensated and stable  6. urogyn referral

## 2019-01-15 NOTE — TELEPHONE ENCOUNTER
----- Message from Timmy Davidson sent at 1/15/2019  1:23 PM CST -----  Contact: EDUAR SEAY [[id number]]  Name of Who is Calling: EDUAR SEAY [[id number]]       What is the request in detail: patient request call back form Nurse in regards to personal matters. Please advise.        Can the clinic reply by [facility]: No       [facility] [phone]

## 2019-01-16 ENCOUNTER — OFFICE VISIT (OUTPATIENT)
Dept: INTERNAL MEDICINE | Facility: CLINIC | Age: 74
End: 2019-01-16
Payer: COMMERCIAL

## 2019-01-16 ENCOUNTER — TELEPHONE (OUTPATIENT)
Dept: INTERNAL MEDICINE | Facility: CLINIC | Age: 74
End: 2019-01-16

## 2019-01-16 VITALS
WEIGHT: 132.94 LBS | DIASTOLIC BLOOD PRESSURE: 68 MMHG | HEIGHT: 66 IN | SYSTOLIC BLOOD PRESSURE: 120 MMHG | BODY MASS INDEX: 21.36 KG/M2 | HEART RATE: 70 BPM

## 2019-01-16 DIAGNOSIS — E78.2 MIXED HYPERLIPIDEMIA: Chronic | ICD-10-CM

## 2019-01-16 DIAGNOSIS — N39.46 MIXED STRESS AND URGE URINARY INCONTINENCE: ICD-10-CM

## 2019-01-16 DIAGNOSIS — G60.9 PERIPHERAL NEUROPATHY, IDIOPATHIC: ICD-10-CM

## 2019-01-16 DIAGNOSIS — F41.1 GENERALIZED ANXIETY DISORDER: ICD-10-CM

## 2019-01-16 DIAGNOSIS — K21.9 GASTROESOPHAGEAL REFLUX DISEASE WITHOUT ESOPHAGITIS: ICD-10-CM

## 2019-01-16 DIAGNOSIS — G61.81 CHRONIC INFLAMMATORY DEMYELINATING POLYNEUROPATHY: Primary | Chronic | ICD-10-CM

## 2019-01-16 DIAGNOSIS — I10 ESSENTIAL HYPERTENSION: Chronic | ICD-10-CM

## 2019-01-16 PROCEDURE — 99214 PR OFFICE/OUTPT VISIT, EST, LEVL IV, 30-39 MIN: ICD-10-PCS | Mod: S$GLB,,, | Performed by: INTERNAL MEDICINE

## 2019-01-16 PROCEDURE — 99214 OFFICE O/P EST MOD 30 MIN: CPT | Mod: S$GLB,,, | Performed by: INTERNAL MEDICINE

## 2019-01-16 PROCEDURE — 99999 PR PBB SHADOW E&M-EST. PATIENT-LVL IV: CPT | Mod: PBBFAC,,, | Performed by: INTERNAL MEDICINE

## 2019-01-16 PROCEDURE — 99999 PR PBB SHADOW E&M-EST. PATIENT-LVL IV: ICD-10-PCS | Mod: PBBFAC,,, | Performed by: INTERNAL MEDICINE

## 2019-01-17 ENCOUNTER — OFFICE VISIT (OUTPATIENT)
Dept: UROGYNECOLOGY | Facility: CLINIC | Age: 74
End: 2019-01-17
Payer: COMMERCIAL

## 2019-01-17 VITALS
SYSTOLIC BLOOD PRESSURE: 138 MMHG | DIASTOLIC BLOOD PRESSURE: 86 MMHG | WEIGHT: 135.56 LBS | BODY MASS INDEX: 21.79 KG/M2 | HEIGHT: 66 IN

## 2019-01-17 DIAGNOSIS — F43.9 SITUATIONAL STRESS: ICD-10-CM

## 2019-01-17 DIAGNOSIS — Z12.31 ENCOUNTER FOR SCREENING MAMMOGRAM FOR BREAST CANCER: ICD-10-CM

## 2019-01-17 DIAGNOSIS — M85.80 OSTEOPENIA, UNSPECIFIED LOCATION: ICD-10-CM

## 2019-01-17 DIAGNOSIS — Z12.4 CERVICAL CANCER SCREENING: ICD-10-CM

## 2019-01-17 DIAGNOSIS — N95.2 VAGINAL ATROPHY: ICD-10-CM

## 2019-01-17 DIAGNOSIS — G89.29 CHRONIC RUQ PAIN: ICD-10-CM

## 2019-01-17 DIAGNOSIS — R19.8 IRREGULAR BOWEL HABITS: ICD-10-CM

## 2019-01-17 DIAGNOSIS — R10.11 CHRONIC RUQ PAIN: ICD-10-CM

## 2019-01-17 DIAGNOSIS — N39.46 URINARY INCONTINENCE, MIXED: Primary | ICD-10-CM

## 2019-01-17 LAB
ALBUMIN SERPL-MCNC: 4.1 G/DL (ref 3.6–5.1)
ALBUMIN/GLOB SERPL: 1.8 (CALC) (ref 1–2.5)
ALP SERPL-CCNC: 67 U/L (ref 33–130)
ALT SERPL-CCNC: 14 U/L (ref 6–29)
AST SERPL-CCNC: 18 U/L (ref 10–35)
BASOPHILS # BLD AUTO: 43 CELLS/UL (ref 0–200)
BASOPHILS NFR BLD AUTO: 0.7 %
BILIRUB SERPL-MCNC: 0.9 MG/DL (ref 0.2–1.2)
BUN SERPL-MCNC: 10 MG/DL (ref 7–25)
BUN/CREAT SERPL: ABNORMAL (CALC) (ref 6–22)
CALCIUM SERPL-MCNC: 9.5 MG/DL (ref 8.6–10.4)
CHLORIDE SERPL-SCNC: 98 MMOL/L (ref 98–110)
CHOLEST SERPL-MCNC: 268 MG/DL
CHOLEST/HDLC SERPL: 2.6 (CALC)
CO2 SERPL-SCNC: 34 MMOL/L (ref 20–32)
CREAT SERPL-MCNC: 0.62 MG/DL (ref 0.6–0.93)
EOSINOPHIL # BLD AUTO: 201 CELLS/UL (ref 15–500)
EOSINOPHIL NFR BLD AUTO: 3.3 %
ERYTHROCYTE [DISTWIDTH] IN BLOOD BY AUTOMATED COUNT: 12.8 % (ref 11–15)
GFR SERPL CREATININE-BSD FRML MDRD: 91 ML/MIN/1.73M2
GLOBULIN SER CALC-MCNC: 2.3 G/DL (CALC) (ref 1.9–3.7)
GLUCOSE SERPL-MCNC: 99 MG/DL (ref 65–99)
HCT VFR BLD AUTO: 43.9 % (ref 35–45)
HDLC SERPL-MCNC: 104 MG/DL
HGB BLD-MCNC: 15.4 G/DL (ref 11.7–15.5)
LDLC SERPL CALC-MCNC: 144 MG/DL (CALC)
LYMPHOCYTES # BLD AUTO: 1751 CELLS/UL (ref 850–3900)
LYMPHOCYTES NFR BLD AUTO: 28.7 %
MCH RBC QN AUTO: 31.7 PG (ref 27–33)
MCHC RBC AUTO-ENTMCNC: 35.1 G/DL (ref 32–36)
MCV RBC AUTO: 90.3 FL (ref 80–100)
MONOCYTES # BLD AUTO: 561 CELLS/UL (ref 200–950)
MONOCYTES NFR BLD AUTO: 9.2 %
NEUTROPHILS # BLD AUTO: 3544 CELLS/UL (ref 1500–7800)
NEUTROPHILS NFR BLD AUTO: 58.1 %
NONHDLC SERPL-MCNC: 164 MG/DL (CALC)
PLATELET # BLD AUTO: 286 THOUSAND/UL (ref 140–400)
PMV BLD REES-ECKER: 9.1 FL (ref 7.5–12.5)
POTASSIUM SERPL-SCNC: 3.5 MMOL/L (ref 3.5–5.3)
PROT SERPL-MCNC: 6.4 G/DL (ref 6.1–8.1)
RBC # BLD AUTO: 4.86 MILLION/UL (ref 3.8–5.1)
SODIUM SERPL-SCNC: 141 MMOL/L (ref 135–146)
TRIGL SERPL-MCNC: 92 MG/DL
WBC # BLD AUTO: 6.1 THOUSAND/UL (ref 3.8–10.8)

## 2019-01-17 PROCEDURE — 88175 CYTOPATH C/V AUTO FLUID REDO: CPT

## 2019-01-17 PROCEDURE — 51701 PR INSERTION OF NON-INDWELLING BLADDER CATHETERIZATION FOR RESIDUAL UR: ICD-10-PCS | Mod: S$GLB,,, | Performed by: OBSTETRICS & GYNECOLOGY

## 2019-01-17 PROCEDURE — 87086 URINE CULTURE/COLONY COUNT: CPT

## 2019-01-17 PROCEDURE — 99999 PR PBB SHADOW E&M-EST. PATIENT-LVL IV: CPT | Mod: PBBFAC,,, | Performed by: OBSTETRICS & GYNECOLOGY

## 2019-01-17 PROCEDURE — 99205 PR OFFICE/OUTPT VISIT, NEW, LEVL V, 60-74 MIN: ICD-10-PCS | Mod: 25,S$GLB,, | Performed by: OBSTETRICS & GYNECOLOGY

## 2019-01-17 PROCEDURE — 51701 INSERT BLADDER CATHETER: CPT | Mod: S$GLB,,, | Performed by: OBSTETRICS & GYNECOLOGY

## 2019-01-17 PROCEDURE — 99999 PR PBB SHADOW E&M-EST. PATIENT-LVL IV: ICD-10-PCS | Mod: PBBFAC,,, | Performed by: OBSTETRICS & GYNECOLOGY

## 2019-01-17 PROCEDURE — 99205 OFFICE O/P NEW HI 60 MIN: CPT | Mod: 25,S$GLB,, | Performed by: OBSTETRICS & GYNECOLOGY

## 2019-01-17 RX ORDER — OXYBUTYNIN CHLORIDE 10 MG/1
10 TABLET, EXTENDED RELEASE ORAL DAILY
Qty: 30 TABLET | Refills: 12 | Status: SHIPPED | OUTPATIENT
Start: 2019-01-17 | End: 2019-02-28

## 2019-01-17 RX ORDER — ESTRADIOL 0.1 MG/G
CREAM VAGINAL
Qty: 42.5 G | Refills: 11 | Status: SHIPPED | OUTPATIENT
Start: 2019-01-17 | End: 2019-02-26 | Stop reason: CLARIF

## 2019-01-17 NOTE — PROGRESS NOTES
[facility] [address] [place] [address]    Cecilia Munguia  [id number]  1945    Consulting Physician: Ankur Hernandez MD   GYN: at [facility]  Philippe SORIANO: Ankur Hernandez MD    Chief Complaint   Patient presents with    Consult     Mixed Stress / Urinary incontinence       HPI:     1)  UI:  (+) KAVIN (sneezing, moving) < (+) UUI  X 1 year.  (+) pads: 1-2/d, damp mostly. Takes diuretic at 10 AM, then wears pad right after because will have some issues making it to BR with UUI.   Daytime frequency: Q 2-3 hours.  Nocturia: Yes: 2/night.  UUI is not as bad at night. Does drink 2 glasses of wine at night with dinner.   (--) dysuria,  (--) hematuria,  (--) frequent UTIs.  (+) complete bladder emptying.    2)  POP:  Absent.(--) vaginal bleeding. (--) vaginal discharge. (+) sexually active.  (+) dyspareunia. Mild due to dryness.  (+)  Vaginal dryness. Uses OTC lube. (--) vaginal estrogen use.     3)  BM:  (+) constipation/straining intermittent with loose stool. Followed by GI. Seems triggered by diet.  Better with probiotics and IBDgard.  Has been very stressed, as well.  Has some chronic.   (--) chronic diarrhea. (--) hematochezia.  (--) fecal incontinence.  (--) fecal smearing/urgency.  (+) complete evacuation.     Past Medical History  Past Medical History:   Diagnosis Date    Anxiety     Chronic inflammatory demyelinating neuropathy     Depression     GERD (gastroesophageal reflux disease)     Hyperlipidemia     Hypertension     Neuromuscular disorder     demyelinating neuropathy    Ulcer     several years ago   Demyelinating disorder: Has slowly progressed.  Newer balance issues. No saddle numbness.  Sees neuro for IVIG.  Flares: has numbness, distal to proximal in LE/UE.  Has some motor issues.      Past Surgical History  Past Surgical History:   Procedure Laterality Date     SECTION      COSMETIC SURGERY      TONSILLECTOMY      ectopic x 1: Pffanenstiel     Hysterectomy: No    Past Ob History    Ectopic x 1.    x 0.  C/s x 1.    Largest infant weight: 7#3oz.     Gynecologic History  LMP: No LMP recorded. Patient is postmenopausal.  Age of menarche: 14 yo  Age of menopause: early 50s  Menstrual history: h/o normal  Pap test: ~, normal per report.  History of abnormal paps: No.  History of STIs:  No  Mammogram: Date of last: ([facility]).  Result: Normal  Colonoscopy: Date of last: ~ [facility]  Result: normal per report.  Repeat due:  This year.    DEXA:  Date of last: 3/18.  Result:  osteopenia. There is a 13.4% risk of a major osteoporotic fracture and a 1.4% risk of hip fracture in the next 10 years (FRAX).  Repeat due:  .     Family History  Family History   Problem Relation Age of Onset    Diabetes Mother     Hypertension Father     Heart disease Father     Diabetes Brother     Heart attack Brother     Heart disease Brother       Colon CA: No  Breast CA: No  GYN CA: No   CA: No    Social History  Social History     Tobacco Use   Smoking Status Former Smoker    Last attempt to quit: 2014    Years since quittin.1   Smokeless Tobacco Never Used   Tobacco Comment    1st [id number];quit again 2014     Cessation date: .  Will still socially smoke.   Social History     Substance and Sexual Activity   Alcohol Use Yes   .    Social History     Substance and Sexual Activity   Drug Use No     The patient is .  Resides in [place] [address].  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      Allergies  Review of patient's allergies indicates:   Allergen Reactions    Sulfa (sulfonamide antibiotics) Other (See Comments)     shaking       Medications  Current Outpatient Medications on File Prior to Visit   Medication Sig Dispense Refill    fluticasone (FLONASE) 50 mcg/actuation nasal spray USE 2 SPRAYS IN EACH NOSTRIL DAILY. 16 g 2     "hydroCHLOROthiazide (HYDRODIURIL) 25 MG tablet TAKE 1 TABLET (25 MG TOTAL) BY MOUTH ONCE DAILY. 90 tablet 3    omeprazole (PRILOSEC) 20 MG capsule Take 20 mg by mouth Daily.       peppermint oil (IBGARD ORAL) Take by mouth.      simethicone (MYLICON) 80 MG chewable tablet Take 80 mg by mouth every 6 (six) hours as needed for Flatulence.       No current facility-administered medications on file prior to visit.        Review of Systems A 14 point ROS was reviewed with pertinent positives as noted above in the history of present illness.      Constitutional: negative  Eyes: negative  Endocrine: negative  Gastrointestinal: negative  Cardiovascular: negative  Respiratory: negative  Allergic/Immunologic: negative  Integumentary: negative  Psychiatric: negative  Musculoskeletal: negative   Ear/Nose/Throat: negative  Neurologic: negative  Genitourinary: SEE HPI  Hematologic/Lymphatic: negative   Breast: negative    Urogynecologic Exam  /86   Ht 5' 6" (1.676 m)   Wt 61.5 kg (135 lb 9.3 oz)   BMI 21.88 kg/m²     GENERAL APPEARANCE:  The patient is well-developed, well-nourished.  Neck:  Supple with no thyromegaly, no carotid bruits.  Heart:  Regular rate and rhythm, no murmurs, rubs or gallops.  Lungs:  Clear.  No CVA tenderness.  Abdomen:  Soft, nontender, nondistended, no hepatosplenomegaly.  Incisions:  Pfannenstiel, well-healed    PELVIC:    External genitalia:  Normal Bartholins, Skenes and labia bilaterally.    Urethra:  No caruncle, diverticulum or masses.  (+) hypermobility.    Vagina:  Atrophy (+) , no bladder masses or tender, no discharge.    Cervix:  normal appearance; pap done  Uterus: normal size, contour, position, consistency, mobility, non-tender  Adnexa: Not palpable.    POP-Q:    Deferred.  No obvious POP present with valsalva.     NEUROLOGIC:  Cranial nerves 2 through 12 intact.  Strength 5/5.  DTRs 2+ lower extremities.  S2 through 4 normal.  Sacral reflexes intact.    EXT: MCDONOUGH, 2+ pulses " bilaterally, no C/C/E    COUGH STRESS TEST:  negative  KEGEL: 1 /5    RECTAL:    External:  Normal, (--) hemorrhoids, (--) dovetailing.   Internal:   (--) tenderness, (--) masses, Normal resting tone, Normal active tone. HEME NEG    PVR: 20 mL    Impression    1. Urinary incontinence, mixed    2. Encounter for screening mammogram for breast cancer    3. Cervical cancer screening    4. Vaginal atrophy    5. Irregular bowel habits    6. Chronic RUQ pain    7. Osteopenia, unspecified location    8. Situational stress        Initial Plan  The patient was counseled regarding these issues. The patient was given a summary sheet containing each of these issues with possible options for evaluation and management. When appropriate, we also reviewed computer-generated diagrams specific to their diagnoses..  All questions were addressed to the patient's satisfaction.    1) Mixed urinary incontinence, urge > stress:    --urine C&S  --consider contribution of neuromuscular do--appears stable currently  --Empty bladder every 3 hours.  Empty well: wait a minute, lean forward on toilet.    --Avoid dietary irritants (see sheet).  Keep diary x 3-5 days to determine your irritants.  --start pelvic floor PT if possible.  Call in a few days to make an appt:  Lauren Shepley ([facility]).  (p) [phone].  Or [phone].   --URGE: start oxybutynin 10 mg xl daily.  Takes 2-4 weeks to see if will have effect.  For dry mouth: get sour, sugar free lozenge or gum.    --STRESS:  Pessary vs. Sling.     2)  Irregular bowel habits:  --hydrate well  --keep diary, notice triggers, and cut back if noted  --continue daily probiotic  --take IBDgard as needed for bad times  --work on stress/anxiety control:  Consider speaking with therapist, cognitive behavioral therapy.   --Controlling may help bladder urgency/leakage and fiber may better control cholesterol and blood glucose.  Start daily fiber.  Take 1 tsp of fiber powder (psyllium or other  sugar-free powder).  Mix in 8 oz of water.  Take x 3-5 days.  Then, increase fiber by 1 tsp every 3-5 days until stool is easy to pass, bowel movements are not too hard and not too loose.  Stop and continue at that dose.   Do not exceed 6 tsps/day.    --for hard stool: May also use over the counter stool softener 1-2 x/day.  AVOID laxatives.    Therapists:   [facility])  Ashley Choi  [address] [place], [place]    [id number] [phone]  [email or web address]    Omi Greene [facility], LM  p) [phone]  [address] [place]    [address]    Delicia Latham [facility]  (p) [phone]  [address] [place] [address]     [address]    Adelita Miranda and her cell phone number is [phone].     [facility] [phone].      [facility] Human Services Authority:  Violette Johnson -    [phone]    [phone]     [facility] Services [facility]:  Dr. Mclean Head-Yodit -    [phone]     3)  Osteopenia (weakness in bones):  --Take 600 mg calcium every AM and 600 mg calcium every PM (for total of 1200 mg daily).  Take 400 IU vitamin D in AM and 400 IU vitamin D in the PM (for total of 800 IU daily).  Try to take a chew or gummy.   --at least take vitamin D  --Start weight bearing exercises in improve bone density:  This type of exercise forces you to work against gravity. Some examples of weight-bearing exercises include weight training, walking, hiking, jogging, climbing stairs, tennis, and dancing. Examples of exercises that are not weight-bearing include swimming and bicycling. Although these activities help build and maintain strong muscles and have excellent cardiovascular benefits, they are not the best way to exercise your bones.  --Follow up for repeat DEXA (bone density testing) as scheduled on 2020-22.      4)  Well-woman:  Pap test: done  today.  If normal, no further needed.  Still need annual GYN exam.   Mammogram: Date of last: 2017([facility]).  Result: Normal.  Given Rx for MMG at [facility]--please call and schedule.   Colonoscopy: Date of last: ~2012 ([facility]).  Result: normal per report.  Repeat due:  This year.  Please follow up with them.    DEXA:  Date of last: 3/18.  Result:  osteopenia. There is a 13.4% risk of a major osteoporotic fracture and a 1.4% risk of hip fracture in the next 10 years (FRAX).  Repeat due:  2020-22.     5)  RUQ pain, chronic:  --reports RUQ US was normal in past  --no major findings on exam today  --discuss need for further testing with GI MD next week    6)  Vaginal atrophy (dryness):  Use 0.5 gram of estrogen cream in vagina nightly x 2 weeks, then twice a week thereafter.  --treating dryness may help bladder urgency/frequency and reduce UTI frequency    7)  RTC 3 months.     Approximately 60 min were spent in consult, 90 % in discussion.     Thank you for requesting consultation of your patient.  I look forward to participating in their care.    Karine Foley  Female Pelvic Medicine and Reconstructive Surgery  [facility] [place], [place]

## 2019-01-17 NOTE — LETTER
January 27, 2019      Ankur Hernandez MD  [address]  [facility] [address]           [facility] [address] [place] [address]  Phone: [phone]          Patient: Cecilia Munguia   MR Number: [id number]   YOB: 1945   Date of Visit: 1/17/2019       Dear Dr. Ankur Hernandez:    Thank you for referring Cecilia Munguia to me for evaluation. Attached you will find relevant portions of my assessment and plan of care.    If you have questions, please do not hesitate to call me. I look forward to following Cecilia Munguia along with you.    Sincerely,    Karine Foley MD    Enclosure  CC:  No Recipients    If you would like to receive this communication electronically, please contact [email or web address] or [phone] to request more information on [employer] access.    For providers and/or their staff who would like to refer a patient to [facility], please contact us through our one-stop-shop provider referral line, [facility], at [phone].    If you feel you have received this communication in error or would no longer like to receive these types of communications, please e-mail [email or web address]

## 2019-01-17 NOTE — PATIENT INSTRUCTIONS
Bladder Irritants  Certain foods and drinks have been associated with worsening symptoms of urinary frequency, urgency, urge incontinence, or bladder pain. If you suffer from any of these conditions, you may wish to try eliminating one or more of these foods from your diet and see if your symptoms improve. If bladder symptoms are related to dietary factors, strict adherence to a diet thateliminates the food should bring marked relief in 10 days. Once you are feeling better, you can begin to add foods back into your diet, one at a time. If symptoms return, you will be able to identify the irritant. As you add foods back to your diet it is very important that you drink significant amounts of water.    -----------------------------------------------------------------------------------------------  List of Common Bladder Irritants*  Alcoholic beverages  Apples and apple juice  Cantaloupe  Carbonated beverages  Chili and spicy foods  Chocolate  Citrus fruit  Coffee (including decaffeinated)  Cranberries and cranberry juice  Grapes  Guava  Milk Products: milk, cheese, cottage cheese, yogurt, ice cream  Peaches  Pineapple  Plums  Strawberries  Sugar especially artificial sweeteners, saccharin, aspartame, corn sweeteners, honey, fructose, sucrose, lactose  Tea  Tomatoes and tomato juice  Vitamin B complex  Vinegar  *Most people are not sensitive to ALL of these products; your goal is to find the foods that make YOUR symptoms worse.  ---------------------------------------------------------------------------------------------------    Low-acid fruit substitutions include apricots, papaya, pears and watermelon. Coffee drinkers can drink Kava or other lowacid instant drinks. Tea drinkers can substitute non-citrus herbal and sun brewed teas. Calcium carbonate co-buffered with calcium ascorbate can be substituted for Vitamin C. Prelief is a dietary supplement that works as an acid blocker for the bladder.    Where to get more  information:        Overcoming Bladder Disorders by Yasmin Key and Asya Bajwa, 1990        You Dont Have to Live with Cystitis! By Jessica Murillo, 1988  · http://www.urologymanagement.org/oab    -------------------------------------------------------  Fiber Information Sheet  Your doctor has recommended that you follow a high fiber diet. The addition of fiber to your diet can make an enormous difference in your bowel control and regularity. Fiber helps people whether they lose stool or have trouble with constipation. Fiber works by bulking the stool and keeping it formed, yet making the movement soft and easy to pass. Fiber helps keep moisture within the stool so that neither diarrhea nor hard stool occurs. Fiber makes the bowels work more regularly, but it is not a laxative. An additional bonus from eating a high fiber diet is that your risk of cancer is reduced, too.    Most of us eat some high fiber foods already, but nearly all of us do not eat the necessary amount. For example, a slice of whole wheat bread contains only about 10% of the daily recommended amount of fiber. This means if you are relying on only whole wheat bread to meet the recommended fiber requirements, you would need to eat  between 10-18 slices every day! Please note that fiber is NOT in any meat or dairy product. It is only found in grains, vegetables and fruits. The recommended daily fiber intake is 20-25 grams. Foods having high fiber content include:     Fiber One Cereal, ½ cup 13.0 g   Hammer beans, ¾ cup 10.4 g   Wheat bran cereal, 1 oz 10.0 g   Kidney beans, ¾ cup 9.3 g   All Bran Cereal, ½ cup 6.0 g   Oat Bran Cereal, hot, 1 oz 4.0 g   Banana, 1medium 3.8 g   Canned pears, ½ cup 3.7 g   3 prunes or ¼ cup raisins 3.5 g   Whole Wheat Total, 1 cup 3.0 g   Carrots, ½ cup 3.2 g   Apple, small 2.8 g   Broccoli, ½ cup 2.8 g   Cauliflower, ½ cup 2.6 g   Oatmeal, 1 oz 2.5 g   Whole Wheat Toast 2.0 g    Cheerios, 1 1/3 cup 2.0 g   Baked potato with skin 2.0 g   Corn, ½ cup 1.9 g   Popcorn, 3 cups 1.9 g   Orange, medium 1.9 g   Granola bar 1.0 g   Lettuce, ½ cup 0.9 g    If you dont think that you can get enough fiber through your everyday diet, there are many good fiber supplements you can take along with eating your high fiber diet. Some of these are: Metamucil (1 heaping teaspoon or 1-2 wafers), Citrucel (1 tablespoon), Fiberall (1-2 wafers or 1 teaspoon), Perdium (2 rounded teaspoons) and 1-2 teaspoons unprocessed bran (to mix with foods)    You may need to use the fiber supplement up to 3-4 times daily to produce normal elimination. Please follow specific package directions or call us for help in regulating the dose. You may notice some bloating and/or increased gas at first. These symptoms can be relieved by adding fiber to your diet slowly. Once your body gets used to this increased fiber, these symptoms will go away.     -----------------------------------------------------  1) Mixed urinary incontinence, urge > stress:    --urine C&S  --consider contribution of neuromuscular do--appears stable currently  --Empty bladder every 3 hours.  Empty well: wait a minute, lean forward on toilet.    --Avoid dietary irritants (see sheet).  Keep diary x 3-5 days to determine your irritants.  --start pelvic floor PT if possible.  Call in a few days to make an appt:  Lauren Shepley ([facility]).  (p) [phone].  Or [phone].   --URGE: start oxybutynin 10 mg xl daily.  Takes 2-4 weeks to see if will have effect.  For dry mouth: get sour, sugar free lozenge or gum.    --STRESS:  Pessary vs. Sling.     2)  Irregular bowel habits:  --hydrate well  --keep diary, notice triggers, and cut back if noted  --continue daily probiotic  --take IBDgard as needed for bad times  --work on stress/anxiety control:  Consider speaking with therapist, cognitive behavioral therapy.   --Controlling may help bladder urgency/leakage and  fiber may better control cholesterol and blood glucose.  Start daily fiber.  Take 1 tsp of fiber powder (psyllium or other sugar-free powder).  Mix in 8 oz of water.  Take x 3-5 days.  Then, increase fiber by 1 tsp every 3-5 days until stool is easy to pass, bowel movements are not too hard and not too loose.  Stop and continue at that dose.   Do not exceed 6 tsps/day.    --for hard stool: May also use over the counter stool softener 1-2 x/day.  AVOID laxatives.    Therapists:   [facility])  Ashley Choi  [address] [place], [place]    [phone]  [email or web address]    Omi Greene LCSW, [facility]  (p) [phone]  [address] [place]    [address]    Delicia Latham [facility]  (p) [phone]  [address] [place] [address]     [address]    Adelita Ruben and her cell phone number is [phone].     [facility] [phone].      [facility] Human Services Authority:  Violette Johnson -    [phone]    [phone]     [facility] Services District:  Dr. Mclean Head-Yodit -    [phone]     3)  Osteopenia (weakness in bones):  --Take 600 mg calcium every AM and 600 mg calcium every PM (for total of 1200 mg daily).  Take 400 IU vitamin D in AM and 400 IU vitamin D in the PM (for total of 800 IU daily).  Try to take a chew or gummy.   --at least take vitamin D  --Start weight bearing exercises in improve bone density:  This type of exercise forces you to work against gravity. Some examples of weight-bearing exercises include weight training, walking, hiking, jogging, climbing stairs, tennis, and dancing. Examples of exercises that are not weight-bearing include swimming and bicycling. Although these activities help build and maintain strong muscles and have excellent cardiovascular benefits, they are not the best way to exercise your  bones.  --Follow up for repeat DEXA (bone density testing) as scheduled on 2020-22.      4)  Well-woman:  Pap test: done today.  If normal, no further needed.  Still need annual GYN exam.   Mammogram: Date of last: 2017([facility]).  Result: Normal.  Given Rx for MMG at [facility]--please call and schedule.   Colonoscopy: Date of last: ~2012 ([facility]).  Result: normal per report.  Repeat due:  This year.  Please follow up with them.    DEXA:  Date of last: 3/18.  Result:  osteopenia. There is a 13.4% risk of a major osteoporotic fracture and a 1.4% risk of hip fracture in the next 10 years (FRAX).  Repeat due:  2020-22.     5)  RUQ pain, chronic:  --reports RUQ US was normal in past  --no major findings on exam today  --discuss need for further testing with GI MD next week    6)  Vaginal atrophy (dryness):  Use 0.5 gram of estrogen cream in vagina nightly x 2 weeks, then twice a week thereafter.  --treating dryness may help bladder urgency/frequency and reduce UTI frequency    7)  RTC 3 months.

## 2019-01-19 LAB — BACTERIA UR CULT: NO GROWTH

## 2019-01-21 ENCOUNTER — TELEPHONE (OUTPATIENT)
Dept: UROGYNECOLOGY | Facility: CLINIC | Age: 74
End: 2019-01-21

## 2019-01-21 NOTE — TELEPHONE ENCOUNTER
----- Message from Karine Foley MD sent at 1/19/2019  4:24 PM CST -----  Please let the patient know urine C&S was negative for infection.  Thanks!

## 2019-01-25 ENCOUNTER — HOSPITAL ENCOUNTER (OUTPATIENT)
Dept: RADIOLOGY | Facility: OTHER | Age: 74
Discharge: HOME | End: 2019-01-25
Attending: INTERNAL MEDICINE
Payer: COMMERCIAL

## 2019-01-25 DIAGNOSIS — K21.9 GERD (GASTROESOPHAGEAL REFLUX DISEASE): ICD-10-CM

## 2019-01-25 DIAGNOSIS — R19.4 CHANGE IN BOWEL HABITS: ICD-10-CM

## 2019-01-25 DIAGNOSIS — K58.9 IRRITABLE BOWEL SYNDROME: ICD-10-CM

## 2019-01-25 DIAGNOSIS — I10 HTN (HYPERTENSION): ICD-10-CM

## 2019-01-25 DIAGNOSIS — R19.7 DIARRHEA: ICD-10-CM

## 2019-01-25 DIAGNOSIS — R10.13 ABDOMINAL PAIN, EPIGASTRIC: ICD-10-CM

## 2019-01-25 PROCEDURE — 76700 US ABDOMEN COMPLETE: ICD-10-PCS | Mod: 26,,, | Performed by: RADIOLOGY

## 2019-01-25 PROCEDURE — 76700 US EXAM ABDOM COMPLETE: CPT | Mod: TC

## 2019-01-25 PROCEDURE — 76700 US EXAM ABDOM COMPLETE: CPT | Mod: 26,,, | Performed by: RADIOLOGY

## 2019-01-27 PROBLEM — N39.46 URINARY INCONTINENCE, MIXED: Status: ACTIVE | Noted: 2019-01-27

## 2019-01-27 PROBLEM — N95.2 VAGINAL ATROPHY: Status: ACTIVE | Noted: 2019-01-27

## 2019-01-27 PROBLEM — R10.11 CHRONIC RUQ PAIN: Status: ACTIVE | Noted: 2019-01-27

## 2019-01-27 PROBLEM — Z12.4 CERVICAL CANCER SCREENING: Status: ACTIVE | Noted: 2019-01-27

## 2019-01-27 PROBLEM — R19.8 IRREGULAR BOWEL HABITS: Status: ACTIVE | Noted: 2019-01-27

## 2019-01-27 PROBLEM — F43.9 SITUATIONAL STRESS: Status: ACTIVE | Noted: 2019-01-27

## 2019-01-27 PROBLEM — G89.29 CHRONIC RUQ PAIN: Status: ACTIVE | Noted: 2019-01-27

## 2019-01-27 PROBLEM — M85.80 OSTEOPENIA: Status: ACTIVE | Noted: 2019-01-27

## 2019-01-28 ENCOUNTER — TELEPHONE (OUTPATIENT)
Dept: OBSTETRICS AND GYNECOLOGY | Facility: CLINIC | Age: 74
End: 2019-01-28

## 2019-01-28 NOTE — TELEPHONE ENCOUNTER
----- Message from Karine Foley MD sent at 1/27/2019  1:01 PM CST -----  Please let patient know pap smear is normal. Thanks!

## 2019-02-08 ENCOUNTER — TELEPHONE (OUTPATIENT)
Dept: INTERNAL MEDICINE | Facility: CLINIC | Age: 74
End: 2019-02-08

## 2019-02-08 NOTE — TELEPHONE ENCOUNTER
----- Message from Crystal Bynum sent at 2/8/2019  9:08 AM CST -----  Contact: pt  Name of Who is Calling: EDUAR SEAY [[id number]]      What is the request in detail:pt calling to speak with a nurse in regards to EKG.. Please advise      Can the clinic reply by [facility]:no      What [facility] to [facility] Back [facility] not [facility] [phone]

## 2019-02-13 PROCEDURE — 93000 ELECTROCARDIOGRAM COMPLETE: CPT | Mod: S$GLB,,, | Performed by: INTERNAL MEDICINE

## 2019-02-13 PROCEDURE — 93000 EKG 12-LEAD: ICD-10-PCS | Mod: S$GLB,,, | Performed by: INTERNAL MEDICINE

## 2019-02-22 ENCOUNTER — CLINICAL SUPPORT (OUTPATIENT)
Dept: REHABILITATION | Facility: OTHER | Age: 74
End: 2019-02-22
Attending: OBSTETRICS & GYNECOLOGY
Payer: COMMERCIAL

## 2019-02-22 DIAGNOSIS — N39.46 URINARY INCONTINENCE, MIXED: Primary | ICD-10-CM

## 2019-02-22 DIAGNOSIS — M62.89 PELVIC FLOOR DYSFUNCTION: ICD-10-CM

## 2019-02-22 PROCEDURE — 97162 PT EVAL MOD COMPLEX 30 MIN: CPT

## 2019-02-22 NOTE — PROGRESS NOTES
Patient: Cecilia MARRERO [facility] #:  [id number]    Date of treatment: 2019   Time in: 1:20  Time out: 2:00  # Visits: 1 (26 auth)  Referral Expiration: 19  Outpatient Physical Therapy   Initial Evaluation    Cecilia is a 73 y.o. female evaluated on 2019    Physician:  Karine Foley MD   Diagnosis:   Encounter Diagnoses   Name Primary?    Urinary incontinence, mixed Yes    Pelvic floor dysfunction         Treatment ordered: PT    Medical History:   Past Medical History:   Diagnosis Date    Anxiety     Chronic inflammatory demyelinating neuropathy     Depression     GERD (gastroesophageal reflux disease)     Hyperlipidemia     Hypertension     Neuromuscular disorder     demyelinating neuropathy    Ulcer     several years ago        Surgical History:   Past Surgical History:   Procedure Laterality Date     SECTION      COSMETIC SURGERY      TONSILLECTOMY          Medications:   Current Outpatient Medications   Medication Sig    estradiol (ESTRACE) 0.01 % (0.1 mg/gram) vaginal cream 0.5 g vaginally nightly x 2 weeks, then 2x/week thereafter.    fluticasone (FLONASE) 50 mcg/actuation nasal spray USE 2 SPRAYS IN EACH NOSTRIL DAILY.    hydroCHLOROthiazide (HYDRODIURIL) 25 MG tablet TAKE 1 TABLET (25 MG TOTAL) BY MOUTH ONCE DAILY.    omeprazole (PRILOSEC) 20 MG capsule Take 20 mg by mouth Daily.     oxybutynin (DITROPAN-XL) 10 MG 24 hr tablet Take 1 tablet (10 mg total) by mouth once daily.    peppermint oil (IBGARD ORAL) Take by mouth.    simethicone (MYLICON) 80 MG chewable tablet Take 80 mg by mouth every 6 (six) hours as needed for Flatulence.     No current facility-administered medications for this visit.        Allergies:   Review of patient's allergies indicates:   Allergen Reactions    Sulfa (sulfonamide antibiotics) Other (See Comments)     shaking      Precautions: universal  OB/GYN History: , tubal pregnancy, fibroid  Bladder/Bowel History:  "denies       Barriers to Learning: none  Educational/Spiritual/Cultural needs: none  Environmental Barriers: none noted  Abuse/Neglect: no signs  Nutritional Status: well developed well nourished  Fall Risk: none    Subjective     Pt is schedule for total hip arthroplasty on 3/11, right side. Pt says her pain is tolerabe, it is on the outside of the right hip. Pt's difficulty with gait is due to demyelinating neuropathy which she was diagnosed with 16 years ago. 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 Pt reports UI began some time last year. It has become worse lately. If she sneezes, coughs, bends, or holds for a long time she will leak.     Pt states that she just had her third colonoscopy, has had benign polyps, this time she was told she has inflammation and is waiting for further results.  Pt states she has been depressed, and stressed about the hip replacement; wakes up at night and has trouble going back to sleep.   Pt enjoys painting, she is an artist.    Pain or lack of sensation with vaginal/pelvic exam? denies  Sexually active? yes    Frequency of Urination: Daytime: 6-8        Nighttime: 2, sometimes leaks a few drops on the way to the bathroom    Bladder Leakage: yes  Frequency of incidents: daily  Amount Leaked: tsp    Frequency of Bowel Movements: pt states she has been having a problem since SABINA, diarrhea sometimes 3 times/day; started taking IBguard and a probiotic  Currently every day or every other day; pt states up until new year's she had no problems with her intestines or her periods    Colon Leakage: No    Form of Protection: pad  Number of Pads required in 24 hours: generally one unless it is very soaked    Types/amount of Fluid Intake: wine, diet coke, "hates water"  Current Exercise: none    Occupation: Artist  Living situation?      Patient's Goals: to get better, not have to wear pads    Objective "     Deferred d/t time constraint and upcoming hip surgery; will resume PFPT following scheduled arthroplasty    TREATMENT    Education: Instructed on anatomy/physiology of urinary/bowel/reproductive system and PF function using pelvic model; discussed plan of care with patient; instructed in purpose of physical therapy and the  benefits/risks of treatment; instructed in risks of refusing treatment. Patient agreed to treatment plan. Pt was instructed to decrease diet coke intake, increase water intake, and to spend a few minutes every day of deep breathing (we practiced at today's visit); pt verbalized understanding of all education provided and is in agreement with the treatment plan.     Assessment     This is a 73 y.o. female referred to outpatient physical therapy with a medical diagnosis of urinary incotinence. Pt presents today reporting upcoming hip surgery as well as many other commitments related to sadia corado, we discussed role of PFPT and treatment and will resume following healing of her HAYLEY due to limited time for visits prior. Will leave POC open at this time in order to proceed with assessment at her next visit. Pt will benefit from physcial therapy services in order to maximize pain free functional independence. The following goals were discussed with the patient and patient is in agreement with them as to be addressed in the treatment plan. Pt was given a HEP as described above. Pt verbally understood the instructions as they were given and demonstrated proper form and technique during therapy. Pt was advise to perform these exercises free of pain and to stop performing them if pain occurs.     Prognosis: good  No educational, cultural, or spiritual needs identified.    History  Co-morbidities and personal factors that may impact the plan of care Examination  Body Structures and Functions, activity limitations and participation restrictions that may impact the plan of care    Clinical Presentation    Co-morbidities:     Fibroids  Demyelinating neuropathy  Arthritis  Anxiety, depression    Personal Factors:    Body Regions:   Pelvis, abdomen, trunk    Body Systems:    Musculoskeletal, neuromuscular    Participation Restrictions:        Activity limitations:   Learning and applying knowledge  no deficits    General Tasks and Commands  no deficits    Communication  no deficits    Mobility  no deficits    Self care  no deficits    Domestic Life  no deficits    Interactions/Relationships  no deficits    Life Areas  no deficits    Community and Social Life  no deficits         evolving clinical presentation with changing clinical characteristics                      moderate   high  high Decision Making/ Complexity Score:  moderate     GOALS  Short Term Goals: 4 weeks  1. Pt will verbalize improved awareness of PFM activity as palpated by PT in order to improve activity involvement with HEP.  2. Pt will be independent with restful breathing/diaphragmatic breathing in order to improve central stabilization and pelvic floor engagement.  3. Pt will tolerate HEP to improve impairments and independence with ADL's.    Long Term Goals: 12 weeks  1. Pt will be able to perform 10 x 5'' kegels in order to improve pelvic floor strength and decrease UI.  2. Pt will be independent with HEP and self management.    Plan     Pt will be treated by physical therapy following hip surgery for Pt Education, HEP, therapeutic exercises, neuromuscular re-education, therapeutic activity, gait training, manual therapy, and modalities (including SEMG) PRN to achieve established goals.     POC to go through 19 due to lapse for surgery

## 2019-02-22 NOTE — PLAN OF CARE
Patient: Cecilia MARRERO [facility] #:  [id number]    Date of treatment: 2019   Time in: 1:20  Time out: 2:00  # Visits: 1 (26 auth)  Referral Expiration: 19  Outpatient Physical Therapy   Initial Evaluation    Cecilia is a 73 y.o. female evaluated on 2019    Physician:  Karine Foley MD   Diagnosis:   Encounter Diagnoses   Name Primary?    Urinary incontinence, mixed Yes    Pelvic floor dysfunction         Treatment ordered: PT    Medical History:   Past Medical History:   Diagnosis Date    Anxiety     Chronic inflammatory demyelinating neuropathy     Depression     GERD (gastroesophageal reflux disease)     Hyperlipidemia     Hypertension     Neuromuscular disorder     demyelinating neuropathy    Ulcer     several years ago        Surgical History:   Past Surgical History:   Procedure Laterality Date     SECTION      COSMETIC SURGERY      TONSILLECTOMY          Medications:   Current Outpatient Medications   Medication Sig    estradiol (ESTRACE) 0.01 % (0.1 mg/gram) vaginal cream 0.5 g vaginally nightly x 2 weeks, then 2x/week thereafter.    fluticasone (FLONASE) 50 mcg/actuation nasal spray USE 2 SPRAYS IN EACH NOSTRIL DAILY.    hydroCHLOROthiazide (HYDRODIURIL) 25 MG tablet TAKE 1 TABLET (25 MG TOTAL) BY MOUTH ONCE DAILY.    omeprazole (PRILOSEC) 20 MG capsule Take 20 mg by mouth Daily.     oxybutynin (DITROPAN-XL) 10 MG 24 hr tablet Take 1 tablet (10 mg total) by mouth once daily.    peppermint oil (IBGARD ORAL) Take by mouth.    simethicone (MYLICON) 80 MG chewable tablet Take 80 mg by mouth every 6 (six) hours as needed for Flatulence.     No current facility-administered medications for this visit.        Allergies:   Review of patient's allergies indicates:   Allergen Reactions    Sulfa (sulfonamide antibiotics) Other (See Comments)     shaking      Precautions: universal  OB/GYN History: , tubal pregnancy, fibroid  Bladder/Bowel History:  "denies       Barriers to Learning: none  Educational/Spiritual/Cultural needs: none  Environmental Barriers: none noted  Abuse/Neglect: no signs  Nutritional Status: well developed well nourished  Fall Risk: none    Subjective     Pt is schedule for total hip arthroplasty on 3/11, right side. Pt says her pain is tolerabe, it is on the outside of the right hip. Pt's difficulty with gait is due to demyelinating neuropathy which she was diagnosed with 16 years ago. 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 Pt reports UI began some time last year. It has become worse lately. If she sneezes, coughs, bends, or holds for a long time she will leak.     Pt states that she just had her third colonoscopy, has had benign polyps, this time she was told she has inflammation and is waiting for further results.  Pt states she has been depressed, and stressed about the hip replacement; wakes up at night and has trouble going back to sleep.   Pt enjoys painting, she is an artist.    Pain or lack of sensation with vaginal/pelvic exam? denies  Sexually active? yes    Frequency of Urination: Daytime: 6-8        Nighttime: 2, sometimes leaks a few drops on the way to the bathroom    Bladder Leakage: yes  Frequency of incidents: daily  Amount Leaked: tsp    Frequency of Bowel Movements: pt states she has been having a problem since SABINA, diarrhea sometimes 3 times/day; started taking IBguard and a probiotic  Currently every day or every other day; pt states up until new year's she had no problems with her intestines or her periods    Colon Leakage: No    Form of Protection: pad  Number of Pads required in 24 hours: generally one unless it is very soaked    Types/amount of Fluid Intake: wine, diet coke, "hates water"  Current Exercise: none    Occupation: Artist  Living situation?      Patient's Goals: to get better, not have to wear pads    Objective "     Deferred d/t time constraint and upcoming hip surgery; will resume PFPT following scheduled arthroplasty    TREATMENT    Education: Instructed on anatomy/physiology of urinary/bowel/reproductive system and PF function using pelvic model; discussed plan of care with patient; instructed in purpose of physical therapy and the  benefits/risks of treatment; instructed in risks of refusing treatment. Patient agreed to treatment plan. Pt was instructed to decrease diet coke intake, increase water intake, and to spend a few minutes every day of deep breathing (we practiced at today's visit); pt verbalized understanding of all education provided and is in agreement with the treatment plan.     Assessment     This is a 73 y.o. female referred to outpatient physical therapy with a medical diagnosis of urinary incotinence. Pt presents today reporting upcoming hip surgery as well as many other commitments related to sdaia corado, we discussed role of PFPT and treatment and will resume following healing of her HAYLEY due to limited time for visits prior. Will leave POC open at this time in order to proceed with assessment at her next visit. Pt will benefit from physcial therapy services in order to maximize pain free functional independence. The following goals were discussed with the patient and patient is in agreement with them as to be addressed in the treatment plan. Pt was given a HEP as described above. Pt verbally understood the instructions as they were given and demonstrated proper form and technique during therapy. Pt was advise to perform these exercises free of pain and to stop performing them if pain occurs.     Prognosis: good  No educational, cultural, or spiritual needs identified.    History  Co-morbidities and personal factors that may impact the plan of care Examination  Body Structures and Functions, activity limitations and participation restrictions that may impact the plan of care    Clinical Presentation    Co-morbidities:     Fibroids  Demyelinating neuropathy  Arthritis  Anxiety, depression    Personal Factors:    Body Regions:   Pelvis, abdomen, trunk    Body Systems:    Musculoskeletal, neuromuscular    Participation Restrictions:        Activity limitations:   Learning and applying knowledge  no deficits    General Tasks and Commands  no deficits    Communication  no deficits    Mobility  no deficits    Self care  no deficits    Domestic Life  no deficits    Interactions/Relationships  no deficits    Life Areas  no deficits    Community and Social Life  no deficits         evolving clinical presentation with changing clinical characteristics                      moderate   high  high Decision Making/ Complexity Score:  moderate     GOALS  Short Term Goals: 4 weeks  1. Pt will verbalize improved awareness of PFM activity as palpated by PT in order to improve activity involvement with HEP.  2. Pt will be independent with restful breathing/diaphragmatic breathing in order to improve central stabilization and pelvic floor engagement.  3. Pt will tolerate HEP to improve impairments and independence with ADL's.    Long Term Goals: 12 weeks  1. Pt will be able to perform 10 x 5'' kegels in order to improve pelvic floor strength and decrease UI.  2. Pt will be independent with HEP and self management.    Plan     Pt will be treated by physical therapy following hip surgery for Pt Education, HEP, therapeutic exercises, neuromuscular re-education, therapeutic activity, gait training, manual therapy, and modalities (including SEMG) PRN to achieve established goals.     POC to go through 19 due to lapse for surgery

## 2019-02-26 ENCOUNTER — ANESTHESIA EVENT (OUTPATIENT)
Dept: SURGERY | Facility: OTHER | Age: 74
DRG: 470 | End: 2019-02-26
Payer: COMMERCIAL

## 2019-02-26 ENCOUNTER — HOSPITAL ENCOUNTER (OUTPATIENT)
Dept: PREADMISSION TESTING | Facility: OTHER | Age: 74
Discharge: HOME | End: 2019-02-26
Attending: ORTHOPAEDIC SURGERY
Payer: COMMERCIAL

## 2019-02-26 VITALS
TEMPERATURE: 99 F | HEIGHT: 66 IN | DIASTOLIC BLOOD PRESSURE: 85 MMHG | SYSTOLIC BLOOD PRESSURE: 127 MMHG | HEART RATE: 77 BPM | WEIGHT: 131 LBS | BODY MASS INDEX: 21.05 KG/M2 | OXYGEN SATURATION: 97 % | RESPIRATION RATE: 16 BRPM

## 2019-02-26 DIAGNOSIS — M16.11 PRIMARY OSTEOARTHRITIS OF RIGHT HIP: Primary | ICD-10-CM

## 2019-02-26 LAB
ABO + RH BLD: NORMAL
BILIRUB UR QL STRIP: ABNORMAL
BLD GP AB SCN CELLS X3 SERPL QL: NORMAL
CLARITY UR: CLEAR
COLOR UR: YELLOW
GLUCOSE UR QL STRIP: NEGATIVE
HGB UR QL STRIP: ABNORMAL
KETONES UR QL STRIP: NEGATIVE
LEUKOCYTE ESTERASE UR QL STRIP: NEGATIVE
NITRITE UR QL STRIP: NEGATIVE
PH UR STRIP: 8 [PH] (ref 5–8)
PROT UR QL STRIP: ABNORMAL
SP GR UR STRIP: 1.01 (ref 1–1.03)
URN SPEC COLLECT METH UR: ABNORMAL
UROBILINOGEN UR STRIP-ACNC: NEGATIVE EU/DL

## 2019-02-26 PROCEDURE — 86850 RBC ANTIBODY SCREEN: CPT

## 2019-02-26 PROCEDURE — 81003 URINALYSIS AUTO W/O SCOPE: CPT

## 2019-02-26 PROCEDURE — 36415 COLL VENOUS BLD VENIPUNCTURE: CPT

## 2019-02-26 RX ORDER — FAMOTIDINE 20 MG/1
20 TABLET, FILM COATED ORAL
Status: CANCELLED | OUTPATIENT
Start: 2019-02-26 | End: 2019-02-26

## 2019-02-26 RX ORDER — PREGABALIN 75 MG/1
150 CAPSULE ORAL
Status: CANCELLED | OUTPATIENT
Start: 2019-02-26 | End: 2019-02-26

## 2019-02-26 RX ORDER — ACETAMINOPHEN 500 MG
1000 TABLET ORAL
Status: CANCELLED | OUTPATIENT
Start: 2019-02-26 | End: 2019-02-26

## 2019-02-26 RX ORDER — SODIUM CHLORIDE, SODIUM LACTATE, POTASSIUM CHLORIDE, CALCIUM CHLORIDE 600; 310; 30; 20 MG/100ML; MG/100ML; MG/100ML; MG/100ML
INJECTION, SOLUTION INTRAVENOUS CONTINUOUS
Status: CANCELLED | OUTPATIENT
Start: 2019-02-26

## 2019-02-26 NOTE — DISCHARGE INSTRUCTIONS
PRE-ADMIT TESTING -  [phone]    [address]  [place] [facility]          Your surgery has been scheduled at [facility]. We are pleased to have the opportunity to serve you. For Further Information please call [phone].    On the day of surgery please report to the Information Desk on the 1st floor.    · CONTACT YOUR PHYSICIAN'S OFFICE THE DAY PRIOR TO YOUR SURGERY TO OBTAIN YOUR ARRIVAL TIME.     · The evening before surgery do not eat anything after 9 p.m. ( this includes hard candy, chewing gum and mints).  You may only have GATORADE, POWERADE AND WATER  from 9 p.m. until you leave your home.   DO NOT DRINK ANY LIQUIDS ON THE WAY TO THE HOSPITAL.      SPECIAL MEDICATION INSTRUCTIONS: TAKE medications checked off by the Anesthesiologist on your Medication List.    Angiogram Patients: Take medications as instructed by your physician, including aspirin.     Surgery Patients:    If you take ASPIRIN - Your PHYSICIAN/SURGEON will need to inform you IF/OR when you need to stop taking aspirin prior to your surgery.     Do Not take any medications containing IBUPROFEN.  Do Not Wear any make-up or dark nail polish   (especially eye make-up) to surgery. If you come to surgery with makeup on you will be required to remove the makeup or nail polish.    Do not shave your surgical area at least 5 days prior to your surgery. The surgical prep will be performed at the hospital according to Infection Control regulations.    Leave all valuables at home.   Do Not wear any jewelry or watches, including any metal in body piercings. Jewelry must be removed prior to coming to the hospital.  There is a possibility that rings that are unable to be removed may be cut off if they are on the surgical extremity.    Contact Lens must be removed before surgery. Either do not wear the contact lens or bring a case and solution for storage.  Please bring a container for eyeglasses or dentures as required.  Bring  any paperwork your physician has provided, such as consent forms,  history and physicals, doctor's orders, etc.   Bring comfortable clothes that are loose fitting to wear upon discharge. Take into consideration the type of surgery being performed.  Maintain your diet as advised per your physician the day prior to surgery.      Adequate rest the night before surgery is advised.   Park in the Parking lot behind the hospital or in the [facility] across the street from the parking lot. Parking is complimentary.  If you will be discharged the same day as your procedure, please arrange for a responsible adult to drive you home or to accompany you if traveling by taxi.   YOU WILL NOT BE PERMITTED TO DRIVE OR TO LEAVE THE HOSPITAL ALONE AFTER SURGERY.   It is strongly recommended that you arrange for someone to remain with you for the first 24 hrs following your surgery.       Thank you for your cooperation.  The Staff of [facility].        Bathing Instructions                                                                 Please shower the evening before and morning of your procedure with    ANTIBACTERIAL SOAP. ( DIAL, etc )  Concentrate on the surgical area   for at least 3 minutes and rinse completely. Dry off as usual.   Do not use any deodorant, powder, body lotions, perfume, after shave or    cologne.

## 2019-02-26 NOTE — ANESTHESIA PREPROCEDURE EVALUATION
02/26/2019  Cecilia Munguia is a 73 y.o., female.    Anesthesia Evaluation    I have reviewed the Patient Summary Reports.    I have reviewed the Nursing Notes.   I have reviewed the Medications.     Review of Systems  Anesthesia Hx:  No problems with previous Anesthesia  Denies Family Hx of Anesthesia complications.   Denies Personal Hx of Anesthesia complications.   Social:  Smoker    Hematology/Oncology:  Hematology Normal   Oncology Normal     Cardiovascular:   Hypertension, well controlled    Pulmonary:  Pulmonary Normal    Renal/:  Renal/ Normal     Hepatic/GI:   GERD    Musculoskeletal:  Spine Disorders: cervical Degenerative disease    Neurological:   Neuromuscular Disease, Chronic demyelinating chronic neuropathy with single instance of total paralysis as 18 yr old.  Has relapses every few years that responds to gammaglobulin rx   Endocrine:  Endocrine Normal        Physical Exam  General:  Well nourished    Airway/Jaw/Neck:  Airway Findings: Mouth Opening: Normal Tongue: Normal  General Airway Assessment: Adult  Mallampati: I  TM Distance: Normal, at least 6 cm         Dental:  Dental Findings: In tact, molar caps             Anesthesia Plan  Type of Anesthesia, risks & benefits discussed:  Anesthesia Type:  general  Patient's Preference: has chronic demyelinating neuropathy therefore GA  Intra-op Monitoring Plan: standard ASA monitors  Intra-op Monitoring Plan Comments:   Post Op Pain Control Plan: multimodal analgesia  Post Op Pain Control Plan Comments:   Induction:   IV  Beta Blocker:         Informed Consent: Patient understands risks and agrees with Anesthesia plan.  Questions answered. Anesthesia consent signed with patient.  ASA Score: 3     Day of Surgery Review of History & Physical:    H&P update referred to the surgeon.     Anesthesia Plan Notes: GA due to neurologic  condition        Ready For Surgery From Anesthesia Perspective.

## 2019-02-27 ENCOUNTER — TELEPHONE (OUTPATIENT)
Dept: INTERNAL MEDICINE | Facility: CLINIC | Age: 74
End: 2019-02-27

## 2019-02-27 DIAGNOSIS — R94.31 ABNORMAL EKG: Primary | ICD-10-CM

## 2019-02-27 NOTE — TELEPHONE ENCOUNTER
Pt is concerned to have surgery, needs a call back. Pt wants to go over ecg results. Pt is having general anesthesia and needs to be advise

## 2019-02-27 NOTE — TELEPHONE ENCOUNTER
----- Message from Prince Pascual sent at 2/27/2019 12:09 PM CST -----  Contact: EDUAR SEAY [[id number]]  Name of Who is Calling: EDUAR SEAY [[id number]]      What is the request in detail: Patient would like to speak with staff in regards to EKG results. Please advise      Can the clinic reply by [facility]: no      What Number [facility] Back [facility]: [phone]

## 2019-02-27 NOTE — TELEPHONE ENCOUNTER
Inform pt that I reviewed EKG with cardiology and I'd like her to see cardiology to ensure there are no concerns. I don't suspect there is anything that will keep her from a surgery but would prefer to be cautious instead. Please arrange.

## 2019-02-28 ENCOUNTER — TELEPHONE (OUTPATIENT)
Dept: INTERNAL MEDICINE | Facility: CLINIC | Age: 74
End: 2019-02-28

## 2019-02-28 ENCOUNTER — TELEPHONE (OUTPATIENT)
Dept: CARDIOLOGY | Facility: CLINIC | Age: 74
End: 2019-02-28

## 2019-02-28 ENCOUNTER — OFFICE VISIT (OUTPATIENT)
Dept: CARDIOLOGY | Facility: CLINIC | Age: 74
End: 2019-02-28
Payer: COMMERCIAL

## 2019-02-28 VITALS
SYSTOLIC BLOOD PRESSURE: 132 MMHG | WEIGHT: 133 LBS | DIASTOLIC BLOOD PRESSURE: 89 MMHG | BODY MASS INDEX: 21.38 KG/M2 | HEIGHT: 66 IN | HEART RATE: 66 BPM

## 2019-02-28 DIAGNOSIS — E78.2 MIXED HYPERLIPIDEMIA: Chronic | ICD-10-CM

## 2019-02-28 DIAGNOSIS — Z01.810 PREOPERATIVE CARDIOVASCULAR EXAMINATION: Primary | ICD-10-CM

## 2019-02-28 DIAGNOSIS — I10 ESSENTIAL HYPERTENSION: Chronic | ICD-10-CM

## 2019-02-28 DIAGNOSIS — G61.81 CHRONIC INFLAMMATORY DEMYELINATING POLYNEUROPATHY: Chronic | ICD-10-CM

## 2019-02-28 PROCEDURE — 99999 PR PBB SHADOW E&M-EST. PATIENT-LVL III: ICD-10-PCS | Mod: PBBFAC,,, | Performed by: INTERNAL MEDICINE

## 2019-02-28 PROCEDURE — 99999 PR PBB SHADOW E&M-EST. PATIENT-LVL III: CPT | Mod: PBBFAC,,, | Performed by: INTERNAL MEDICINE

## 2019-02-28 PROCEDURE — 99204 OFFICE O/P NEW MOD 45 MIN: CPT | Mod: S$GLB,,, | Performed by: INTERNAL MEDICINE

## 2019-02-28 PROCEDURE — 99204 PR OFFICE/OUTPT VISIT, NEW, LEVL IV, 45-59 MIN: ICD-10-PCS | Mod: S$GLB,,, | Performed by: INTERNAL MEDICINE

## 2019-02-28 NOTE — TELEPHONE ENCOUNTER
[facility] Colonoscopy report scanned into media.    [facility] RYAN LPN  Clinical Care Coordinator  Internal Medicine  Restorationist/Gris

## 2019-02-28 NOTE — TELEPHONE ENCOUNTER
----- Message from Crystal Bynum sent at 2/28/2019  8:30 AM CST -----  Contact: pt  Name of Who is Calling: EDUAR SEAY [[id number]]    What is the request in detail: pt returning call.. Please advise      Can the clinic reply by [facility]: no        [facility]: [phone]

## 2019-02-28 NOTE — TELEPHONE ENCOUNTER
Called patient to schedule appointment from referral to Cardiology Clinic.  Patient refused to schedule stated she would call clinic to schedule if appointment needed.  Skip SALGADO  [phone]

## 2019-02-28 NOTE — DISCHARGE INSTRUCTIONS
Hip Replacement Discharge Instructions    1. Pain:  a. After surgery you may feel some pain in the operative leg groin area. This is normal. Your hip will likely have been injected with a numbing medicine (Exparel) prior to completion of surgery for pain control. This is indicated on a green bracelet that you will wear for 4 days after surgery. You will also get a prescription for pain control before you leave the hospital.  b. Elevate your leg when sitting for comfort  2. Incision Care:  a. Some drainage from the incision in the first 72 hours is normal. If drainage is excessive, remove bandage,  pat dry, cover with sterile gauze, and secure with tape. Notify M.D. for excessive drainage.  b. Staples will be removed 14 days after surgery.  3. Activity:  a. See attached hip precautions and follow for 6 weeks (instructions found at the end of packet):  b. Perform exercises 3 times a day.  c. Use a hard, flat surface, such as a firm mattress, when exercising.  d. You may shower 2 days after surgery providing the dressing is waterproof.DR PEGUERO PATIENTS CANNOT SHOWER UNTIL STAPLES ARE REMOVED. Support/help is mandatory during showering. If dressing becomes wet, replace with a new dressing. No bathing or swimming for 6 weeks or until incision is completely healed.  e. Wear austin hose for 3 weeks after surgery. You may remove for 1-2 hours during the day only.Send patient home with an extra pair austin hose.  4. Safety:  a. Add cushions to low chairs and car seats for elevation.  b. When getting in and out of a car, it is important to keep your leg straight and out to the side. Wear a seatbelt at all times.  c. You will be given a raised toilet seat (3-1 commode).  d. Use a walker, cane, or crutches as long as M.D. recommends.  5. Possible Complications: Report to Surgeon  a. Infection  i. Unexpected redness  ii. Persistent drainage  iii. Temperature ,can be treated with tylenol. Do not go to the emergency room or urgent  care for a temperature, call your surgeon.   iii. Additional swelling  iv. Pain not controlled with current pain medicine  b. Blood clot  i. Unusual pain  ii. Red or discolored skin  iii. Swelling  iv. Unusual warm skin  c. Dislocation  i. Severe hip pain especially when moved  ii. The injured leg is shorter than the uninjured leg  iii. The injured leg lies in an abnormal position. In most cases the leg is bent at the hip, turned inward and pulled toward the middle of the body.

## 2019-02-28 NOTE — TELEPHONE ENCOUNTER
Spoke with pt re: EKG findings. Not likely a concern but would rather her see cardiology to confirm prior to her surgery for hip. Please schedule soon.

## 2019-02-28 NOTE — TELEPHONE ENCOUNTER
Called pt to confirm appt. Explained the check in process to the patient. She verbalized understanding.

## 2019-02-28 NOTE — NURSING
Total Joint Replacement Questionnaire     Cecilia Munguia participated in Joint Class Feb 20    1. Have you ever had a Joint Replacement?   [x]Yes  [] No    2. How many stairs/steps do you have to enter your home? 4  When going up, on what side is the railing?  [x] Left  [] Right  [] Bilateral  [] None    3. Do you own any Durable Medical Equipment?   [] Rolling Walker  [] Standard Walker  [] Rollator  [] Cane  [] Crutches  [] Bed Side Commode  [] Hip Kit  [] Tub Transfer Bench  [] Shower Chair     4. Have you used a Home Health Company before?   [] Yes  [x] No  If Yes, Name of Company: n/a  Would you like to use this company again?   [] Yes  [] No  [x] Would you like to use your physician's preference?  [x] Yes  [] No    5. Have you arranged for someone to help you, for at least for 3 days, when you are discharged from the hospital?  [x] Yes  [] No  If Yes, Name(s) of person assisting: Spencer,spouse      Stefani Zackery  2/28/2019

## 2019-02-28 NOTE — PROGRESS NOTES
Subjective:   Chief Complaint: Abnormal ECG    Last Clinic Visit: New Patient    History of Present Illness: Cecilia Munguia is a 73 y.o.  lady with hypertension, osteoarthritis, and chronic inflammatory demyelinating polyneuropathy who presents for preoperative evaluation given abnormal EKG.  She denies any anginal chest pain, no shortness of breath, no significant orthopnea, no lower extremity edema, and is fairly active at baseline, able to exercise to a moderate intensity without any significant difficulties.  She denies any chronic kidney disease, no diabetes, no stroke, no congestive heart failure, and no atherosclerotic disease.  She is scheduled for a hip surgery, and had an EKG ordered as part of her preoperative evaluation, which showed possible septal infarct, thus cardiology evaluation was scheduled.  She reports some GERD related pain, but no anginal chest pain, or exertional symptoms. She reports that typically blood pressure well controlled, on HCTZ.    PMHx:  Chronic inflammatory demyelinating polyneuropathy  Hypertension  Osteoarthritis    Review of Systems   Constitution: Negative.   HENT: Negative.    Eyes: Negative.    Cardiovascular: Negative.    Respiratory: Negative.    Hematologic/Lymphatic: Negative.    Skin: Negative.    Musculoskeletal: Negative.    Gastrointestinal: Negative.    Genitourinary: Negative.        Medications:  Current Outpatient Medications on File Prior to Visit   Medication Sig    fluticasone (FLONASE) 50 mcg/actuation nasal spray USE 2 SPRAYS IN EACH NOSTRIL DAILY. (Patient taking differently: pt states she uses as needed)    hydroCHLOROthiazide (HYDRODIURIL) 25 MG tablet TAKE 1 TABLET (25 MG TOTAL) BY MOUTH ONCE DAILY.    L. rhamnosus GG/inulin (CULTURELLE PROBIOTICS ORAL) Take 1 tablet by mouth once daily.    omeprazole (PRILOSEC) 20 MG capsule Take 20 mg by mouth Daily.     peppermint oil (IBGARD ORAL) Take by mouth.    simethicone (MYLICON) 80 MG chewable tablet  "Take 80 mg by mouth every 6 (six) hours as needed for Flatulence.    [DISCONTINUED] oxybutynin (DITROPAN-XL) 10 MG 24 hr tablet Take 1 tablet (10 mg total) by mouth once daily.     No current facility-administered medications on file prior to visit.      Family History:  Cecilia's family history includes Diabetes in her brother and mother; Heart attack in her brother; Heart disease in her brother and father; Hypertension in her father.    Social History:  Cecilia reports that she quit smoking about 4 years ago. she has never used smokeless tobacco. She reports that she drinks alcohol. She reports that she does not use drugs.    Objective:   /89   Pulse 66   Ht 5' 6" (1.676 m)   Wt 60.3 kg (133 lb)   BMI 21.47 kg/m²     Physical Exam   Constitutional: She is oriented to person, place, and time. No distress.   HENT:   Head: Normocephalic and atraumatic.   Mouth/Throat: No oropharyngeal exudate.   Eyes: EOM are normal. No scleral icterus.   Neck: No JVD present. No tracheal deviation present. No thyromegaly present.   Cardiovascular: Normal rate and regular rhythm. Exam reveals no gallop and no friction rub.   No murmur heard.  Pulmonary/Chest: Effort normal and breath sounds normal. No respiratory distress. She has no wheezes. She has no rales. She exhibits no tenderness.   Abdominal: Soft. She exhibits no distension. There is no tenderness. There is no rebound and no guarding.   Musculoskeletal:   Noted bilateral neuropathic changes in hands   Neurological: She is alert and oriented to person, place, and time.   Skin: Skin is warm and dry. She is not diaphoretic. No erythema.   Psychiatric: Affect normal.     EKG:  My independent visualization of most recent EKG is normal sinus rhythm, small R-waves in V1 and V2, thus computerized read as septal infarct.    Lipids:  Recent Labs   Lab 01/16/19  0859   LDL CHOLESTEROL 144 H      CHOLESTEROL 268 H            Assessment:     1. Preoperative cardiovascular " examination    2. Essential hypertension    3. Mixed hyperlipidemia    4. Chronic inflammatory demyelinating polyneuropathy        Plan:   1. Preoperative cardiovascular examination  EKG normal, no evidence of septal infarct, small R-waves likely missed, no further follow-up indicated.  She has no revised cardiac risk index risk factors, and is able to exercise to greater than 4 Mets without any significant difficulty, thus no further preoperative evaluation indicated, proceed on risk benefit basis.    2. Essential hypertension  Blood pressure borderline today, continue hydrochlorothiazide    3. Mixed hyperlipidemia  LDL elevated with, however not interested in statin therapy given demyelinating polyneuropathy    4. Chronic inflammatory demyelinating polyneuropathy  As above, not interested in statin therapy      Follow-up in about 6 months (around 8/28/2019).

## 2019-02-28 NOTE — LETTER
February 28, 2019      Ankur Hernandez MD  [address] [place] [address]           [facility]  [address]  [place] [address]  Phone: [phone]  Fax: [phone]          Patient: Cecilia Munguia   MR Number: [id number]   YOB: 1945   Date of Visit: 2/28/2019       Dear Dr. Ankur Hernandez:    Thank you for referring Cecilia Munguia to me for evaluation. Attached you will find relevant portions of my assessment and plan of care.    If you have questions, please do not hesitate to call me. I look forward to following Cecilia Munguia along with you.    Sincerely,    Manoj Méndez MD    Enclosure  CC:  No Recipients    If you would like to receive this communication electronically, please contact [email or web address] or [phone] to request more information on [employer] access.    For providers and/or their staff who would like to refer a patient to [facility], please contact us through our one-stop-shop provider referral line, [facility], at [phone].    If you feel you have received this communication in error or would no longer like to receive these types of communications, please e-mail [email or web address]

## 2019-03-08 ENCOUNTER — TELEPHONE (OUTPATIENT)
Dept: INTERNAL MEDICINE | Facility: CLINIC | Age: 74
End: 2019-03-08

## 2019-03-08 NOTE — TELEPHONE ENCOUNTER
MGA Colonoscopy patho report scanned into media.    [facility] RYAN LPN  Clinical Care Coordinator  Internal Medicine  [facility]

## 2019-03-11 ENCOUNTER — HOSPITAL ENCOUNTER (INPATIENT)
Facility: OTHER | Age: 74
LOS: 1 days | Discharge: HOME HEALTH | DRG: 470 | End: 2019-03-12
Attending: ORTHOPAEDIC SURGERY | Admitting: ORTHOPAEDIC SURGERY
Payer: COMMERCIAL

## 2019-03-11 ENCOUNTER — ANESTHESIA (OUTPATIENT)
Dept: SURGERY | Facility: OTHER | Age: 74
DRG: 470 | End: 2019-03-11
Payer: COMMERCIAL

## 2019-03-11 DIAGNOSIS — M16.11 PRIMARY OSTEOARTHRITIS OF RIGHT HIP: Primary | ICD-10-CM

## 2019-03-11 DIAGNOSIS — M16.11 OSTEOARTHRITIS OF RIGHT HIP: ICD-10-CM

## 2019-03-11 PROCEDURE — 36000711: Performed by: ORTHOPAEDIC SURGERY

## 2019-03-11 PROCEDURE — 63600175 PHARM REV CODE 636 W HCPCS

## 2019-03-11 PROCEDURE — 97161 PT EVAL LOW COMPLEX 20 MIN: CPT

## 2019-03-11 PROCEDURE — 97530 THERAPEUTIC ACTIVITIES: CPT

## 2019-03-11 PROCEDURE — 25000003 PHARM REV CODE 250: Performed by: ANESTHESIOLOGY

## 2019-03-11 PROCEDURE — 25000003 PHARM REV CODE 250: Performed by: NURSE ANESTHETIST, CERTIFIED REGISTERED

## 2019-03-11 PROCEDURE — 25000003 PHARM REV CODE 250

## 2019-03-11 PROCEDURE — 37000009 HC ANESTHESIA EA ADD 15 MINS: Performed by: ORTHOPAEDIC SURGERY

## 2019-03-11 PROCEDURE — 27000221 HC OXYGEN, UP TO 24 HOURS

## 2019-03-11 PROCEDURE — 11000001 HC ACUTE MED/SURG PRIVATE ROOM

## 2019-03-11 PROCEDURE — 25000003 PHARM REV CODE 250: Performed by: ORTHOPAEDIC SURGERY

## 2019-03-11 PROCEDURE — 71000033 HC RECOVERY, INTIAL HOUR: Performed by: ORTHOPAEDIC SURGERY

## 2019-03-11 PROCEDURE — 71000039 HC RECOVERY, EACH ADD'L HOUR: Performed by: ORTHOPAEDIC SURGERY

## 2019-03-11 PROCEDURE — 36000710: Performed by: ORTHOPAEDIC SURGERY

## 2019-03-11 PROCEDURE — 94761 N-INVAS EAR/PLS OXIMETRY MLT: CPT

## 2019-03-11 PROCEDURE — 63600175 PHARM REV CODE 636 W HCPCS: Performed by: ORTHOPAEDIC SURGERY

## 2019-03-11 PROCEDURE — 97116 GAIT TRAINING THERAPY: CPT

## 2019-03-11 PROCEDURE — C1713 ANCHOR/SCREW BN/BN,TIS/BN: HCPCS | Performed by: ORTHOPAEDIC SURGERY

## 2019-03-11 PROCEDURE — C1776 JOINT DEVICE (IMPLANTABLE): HCPCS | Performed by: ORTHOPAEDIC SURGERY

## 2019-03-11 PROCEDURE — 27201423 OPTIME MED/SURG SUP & DEVICES STERILE SUPPLY: Performed by: ORTHOPAEDIC SURGERY

## 2019-03-11 PROCEDURE — 63600175 PHARM REV CODE 636 W HCPCS: Performed by: NURSE ANESTHETIST, CERTIFIED REGISTERED

## 2019-03-11 PROCEDURE — 94799 UNLISTED PULMONARY SVC/PX: CPT

## 2019-03-11 PROCEDURE — 63600175 PHARM REV CODE 636 W HCPCS: Performed by: ANESTHESIOLOGY

## 2019-03-11 PROCEDURE — 37000008 HC ANESTHESIA 1ST 15 MINUTES: Performed by: ORTHOPAEDIC SURGERY

## 2019-03-11 PROCEDURE — C9290 INJ, BUPIVACAINE LIPOSOME: HCPCS | Performed by: ORTHOPAEDIC SURGERY

## 2019-03-11 DEVICE — LINER POLY 36MM: Type: IMPLANTABLE DEVICE | Site: HIP | Status: FUNCTIONAL

## 2019-03-11 DEVICE — SCREW BONE 6.5X35 SELF-TAP: Type: IMPLANTABLE DEVICE | Site: HIP | Status: FUNCTIONAL

## 2019-03-11 DEVICE — SCREW BONE 6.5X25 SELF-TAP: Type: IMPLANTABLE DEVICE | Site: HIP | Status: FUNCTIONAL

## 2019-03-11 DEVICE — IMPLANTABLE DEVICE: Type: IMPLANTABLE DEVICE | Site: HIP | Status: FUNCTIONAL

## 2019-03-11 DEVICE — STEM FEMORAL 12/14 12X128MM TT: Type: IMPLANTABLE DEVICE | Site: HIP | Status: FUNCTIONAL

## 2019-03-11 DEVICE — HEAD FEMORAL 36MM: Type: IMPLANTABLE DEVICE | Site: HIP | Status: FUNCTIONAL

## 2019-03-11 RX ORDER — TRANEXAMIC ACID 100 MG/ML
INJECTION, SOLUTION INTRAVENOUS
Status: DISCONTINUED | OUTPATIENT
Start: 2019-03-11 | End: 2019-03-11

## 2019-03-11 RX ORDER — VANCOMYCIN HCL IN 5 % DEXTROSE 1G/250ML
15 PLASTIC BAG, INJECTION (ML) INTRAVENOUS
Status: COMPLETED | OUTPATIENT
Start: 2019-03-11 | End: 2019-03-11

## 2019-03-11 RX ORDER — MUPIROCIN 20 MG/G
1 OINTMENT TOPICAL 2 TIMES DAILY
Status: DISCONTINUED | OUTPATIENT
Start: 2019-03-11 | End: 2019-03-12 | Stop reason: HOSPADM

## 2019-03-11 RX ORDER — PROPOFOL 10 MG/ML
VIAL (ML) INTRAVENOUS
Status: DISCONTINUED | OUTPATIENT
Start: 2019-03-11 | End: 2019-03-11

## 2019-03-11 RX ORDER — GLYCOPYRROLATE 0.2 MG/ML
INJECTION INTRAMUSCULAR; INTRAVENOUS
Status: DISCONTINUED | OUTPATIENT
Start: 2019-03-11 | End: 2019-03-11

## 2019-03-11 RX ORDER — DEXTROSE MONOHYDRATE AND SODIUM CHLORIDE 5; .9 G/100ML; G/100ML
INJECTION, SOLUTION INTRAVENOUS CONTINUOUS
Status: DISCONTINUED | OUTPATIENT
Start: 2019-03-11 | End: 2019-03-12 | Stop reason: HOSPADM

## 2019-03-11 RX ORDER — KETOROLAC TROMETHAMINE 30 MG/ML
INJECTION, SOLUTION INTRAMUSCULAR; INTRAVENOUS
Status: DISCONTINUED | OUTPATIENT
Start: 2019-03-11 | End: 2019-03-11 | Stop reason: HOSPADM

## 2019-03-11 RX ORDER — DEXAMETHASONE SODIUM PHOSPHATE 4 MG/ML
INJECTION, SOLUTION INTRA-ARTICULAR; INTRALESIONAL; INTRAMUSCULAR; INTRAVENOUS; SOFT TISSUE
Status: DISCONTINUED | OUTPATIENT
Start: 2019-03-11 | End: 2019-03-11

## 2019-03-11 RX ORDER — PHENYLEPHRINE HYDROCHLORIDE 10 MG/ML
INJECTION INTRAVENOUS
Status: DISCONTINUED | OUTPATIENT
Start: 2019-03-11 | End: 2019-03-11

## 2019-03-11 RX ORDER — BISACODYL 10 MG
10 SUPPOSITORY, RECTAL RECTAL DAILY PRN
Status: DISCONTINUED | OUTPATIENT
Start: 2019-03-11 | End: 2019-03-12 | Stop reason: HOSPADM

## 2019-03-11 RX ORDER — ONDANSETRON 2 MG/ML
8 INJECTION INTRAMUSCULAR; INTRAVENOUS EVERY 8 HOURS PRN
Status: DISCONTINUED | OUTPATIENT
Start: 2019-03-11 | End: 2019-03-12 | Stop reason: HOSPADM

## 2019-03-11 RX ORDER — PREGABALIN 75 MG/1
150 CAPSULE ORAL
Status: COMPLETED | OUTPATIENT
Start: 2019-03-11 | End: 2019-03-11

## 2019-03-11 RX ORDER — SODIUM CHLORIDE 0.9 % (FLUSH) 0.9 %
5 SYRINGE (ML) INJECTION
Status: DISCONTINUED | OUTPATIENT
Start: 2019-03-11 | End: 2019-03-12 | Stop reason: HOSPADM

## 2019-03-11 RX ORDER — CEFAZOLIN SODIUM 1 G/3ML
2 INJECTION, POWDER, FOR SOLUTION INTRAMUSCULAR; INTRAVENOUS
Status: COMPLETED | OUTPATIENT
Start: 2019-03-11 | End: 2019-03-11

## 2019-03-11 RX ORDER — HYDROMORPHONE HYDROCHLORIDE 1 MG/ML
0.5 INJECTION, SOLUTION INTRAMUSCULAR; INTRAVENOUS; SUBCUTANEOUS EVERY 4 HOURS PRN
Status: DISCONTINUED | OUTPATIENT
Start: 2019-03-11 | End: 2019-03-12 | Stop reason: HOSPADM

## 2019-03-11 RX ORDER — MEPERIDINE HYDROCHLORIDE 25 MG/ML
12.5 INJECTION INTRAMUSCULAR; INTRAVENOUS; SUBCUTANEOUS ONCE AS NEEDED
Status: DISCONTINUED | OUTPATIENT
Start: 2019-03-11 | End: 2019-03-11 | Stop reason: HOSPADM

## 2019-03-11 RX ORDER — OXYCODONE HYDROCHLORIDE 5 MG/1
5 TABLET ORAL
Status: DISCONTINUED | OUTPATIENT
Start: 2019-03-11 | End: 2019-03-11 | Stop reason: HOSPADM

## 2019-03-11 RX ORDER — ONDANSETRON 2 MG/ML
INJECTION INTRAMUSCULAR; INTRAVENOUS
Status: DISCONTINUED | OUTPATIENT
Start: 2019-03-11 | End: 2019-03-11

## 2019-03-11 RX ORDER — HYDROMORPHONE HYDROCHLORIDE 2 MG/ML
0.4 INJECTION, SOLUTION INTRAMUSCULAR; INTRAVENOUS; SUBCUTANEOUS EVERY 5 MIN PRN
Status: DISCONTINUED | OUTPATIENT
Start: 2019-03-11 | End: 2019-03-11 | Stop reason: HOSPADM

## 2019-03-11 RX ORDER — SODIUM CHLORIDE 0.9 % (FLUSH) 0.9 %
3 SYRINGE (ML) INJECTION
Status: DISCONTINUED | OUTPATIENT
Start: 2019-03-11 | End: 2019-03-12 | Stop reason: HOSPADM

## 2019-03-11 RX ORDER — BACITRACIN 50000 [IU]/1
INJECTION, POWDER, FOR SOLUTION INTRAMUSCULAR
Status: DISCONTINUED | OUTPATIENT
Start: 2019-03-11 | End: 2019-03-11 | Stop reason: HOSPADM

## 2019-03-11 RX ORDER — FENTANYL CITRATE 50 UG/ML
INJECTION, SOLUTION INTRAMUSCULAR; INTRAVENOUS
Status: DISCONTINUED | OUTPATIENT
Start: 2019-03-11 | End: 2019-03-11

## 2019-03-11 RX ORDER — ASPIRIN 325 MG
325 TABLET ORAL EVERY 12 HOURS
Status: DISCONTINUED | OUTPATIENT
Start: 2019-03-12 | End: 2019-03-12 | Stop reason: HOSPADM

## 2019-03-11 RX ORDER — HYDROCHLOROTHIAZIDE 25 MG/1
25 TABLET ORAL DAILY
Status: DISCONTINUED | OUTPATIENT
Start: 2019-03-12 | End: 2019-03-12

## 2019-03-11 RX ORDER — ACETAMINOPHEN 500 MG
1000 TABLET ORAL
Status: COMPLETED | OUTPATIENT
Start: 2019-03-11 | End: 2019-03-11

## 2019-03-11 RX ORDER — POLYETHYLENE GLYCOL 3350 17 G/17G
17 POWDER, FOR SOLUTION ORAL DAILY
Status: DISCONTINUED | OUTPATIENT
Start: 2019-03-11 | End: 2019-03-12 | Stop reason: HOSPADM

## 2019-03-11 RX ORDER — SODIUM CHLORIDE, SODIUM LACTATE, POTASSIUM CHLORIDE, CALCIUM CHLORIDE 600; 310; 30; 20 MG/100ML; MG/100ML; MG/100ML; MG/100ML
INJECTION, SOLUTION INTRAVENOUS CONTINUOUS
Status: DISCONTINUED | OUTPATIENT
Start: 2019-03-11 | End: 2019-03-11

## 2019-03-11 RX ORDER — FLUTICASONE PROPIONATE 50 MCG
2 SPRAY, SUSPENSION (ML) NASAL DAILY PRN
Status: DISCONTINUED | OUTPATIENT
Start: 2019-03-11 | End: 2019-03-12 | Stop reason: HOSPADM

## 2019-03-11 RX ORDER — OXYCODONE HYDROCHLORIDE 5 MG/1
5 TABLET ORAL EVERY 4 HOURS PRN
Status: DISCONTINUED | OUTPATIENT
Start: 2019-03-11 | End: 2019-03-12 | Stop reason: HOSPADM

## 2019-03-11 RX ORDER — DOCUSATE SODIUM 100 MG/1
100 CAPSULE, LIQUID FILLED ORAL EVERY 12 HOURS
Status: DISCONTINUED | OUTPATIENT
Start: 2019-03-11 | End: 2019-03-12 | Stop reason: HOSPADM

## 2019-03-11 RX ORDER — CELECOXIB 200 MG/1
200 CAPSULE ORAL 2 TIMES DAILY
Status: DISCONTINUED | OUTPATIENT
Start: 2019-03-11 | End: 2019-03-11 | Stop reason: ALTCHOICE

## 2019-03-11 RX ORDER — PANTOPRAZOLE SODIUM 40 MG/1
40 TABLET, DELAYED RELEASE ORAL DAILY
Status: DISCONTINUED | OUTPATIENT
Start: 2019-03-12 | End: 2019-03-12

## 2019-03-11 RX ORDER — ACETAMINOPHEN 10 MG/ML
1000 INJECTION, SOLUTION INTRAVENOUS
Status: DISCONTINUED | OUTPATIENT
Start: 2019-03-11 | End: 2019-03-11

## 2019-03-11 RX ORDER — OXYCODONE HYDROCHLORIDE 5 MG/1
10 TABLET ORAL EVERY 4 HOURS PRN
Status: DISCONTINUED | OUTPATIENT
Start: 2019-03-11 | End: 2019-03-12 | Stop reason: HOSPADM

## 2019-03-11 RX ORDER — FENTANYL CITRATE 50 UG/ML
25 INJECTION, SOLUTION INTRAMUSCULAR; INTRAVENOUS EVERY 5 MIN PRN
Status: DISCONTINUED | OUTPATIENT
Start: 2019-03-11 | End: 2019-03-11 | Stop reason: HOSPADM

## 2019-03-11 RX ORDER — LIDOCAINE HCL/PF 100 MG/5ML
SYRINGE (ML) INTRAVENOUS
Status: DISCONTINUED | OUTPATIENT
Start: 2019-03-11 | End: 2019-03-11

## 2019-03-11 RX ORDER — ONDANSETRON 2 MG/ML
4 INJECTION INTRAMUSCULAR; INTRAVENOUS DAILY PRN
Status: DISCONTINUED | OUTPATIENT
Start: 2019-03-11 | End: 2019-03-11 | Stop reason: HOSPADM

## 2019-03-11 RX ORDER — ROCURONIUM BROMIDE 10 MG/ML
INJECTION, SOLUTION INTRAVENOUS
Status: DISCONTINUED | OUTPATIENT
Start: 2019-03-11 | End: 2019-03-11

## 2019-03-11 RX ORDER — BUPIVACAINE HCL/EPINEPHRINE 0.25-.0005
VIAL (ML) INJECTION
Status: DISCONTINUED | OUTPATIENT
Start: 2019-03-11 | End: 2019-03-11 | Stop reason: HOSPADM

## 2019-03-11 RX ORDER — ACETAMINOPHEN 500 MG
1000 TABLET ORAL EVERY 8 HOURS
Status: DISCONTINUED | OUTPATIENT
Start: 2019-03-11 | End: 2019-03-12 | Stop reason: HOSPADM

## 2019-03-11 RX ORDER — FAMOTIDINE 20 MG/1
20 TABLET, FILM COATED ORAL 2 TIMES DAILY
Status: DISCONTINUED | OUTPATIENT
Start: 2019-03-11 | End: 2019-03-12 | Stop reason: HOSPADM

## 2019-03-11 RX ORDER — CEFAZOLIN SODIUM 2 G/50ML
2 SOLUTION INTRAVENOUS
Status: COMPLETED | OUTPATIENT
Start: 2019-03-11 | End: 2019-03-12

## 2019-03-11 RX ORDER — FAMOTIDINE 20 MG/1
20 TABLET, FILM COATED ORAL
Status: COMPLETED | OUTPATIENT
Start: 2019-03-11 | End: 2019-03-11

## 2019-03-11 RX ORDER — DIPHENHYDRAMINE HYDROCHLORIDE 50 MG/ML
25 INJECTION INTRAMUSCULAR; INTRAVENOUS EVERY 8 HOURS PRN
Status: DISCONTINUED | OUTPATIENT
Start: 2019-03-11 | End: 2019-03-12 | Stop reason: HOSPADM

## 2019-03-11 RX ADMIN — PREGABALIN 150 MG: 75 CAPSULE ORAL at 09:03

## 2019-03-11 RX ADMIN — LIDOCAINE HYDROCHLORIDE 40 MG: 20 INJECTION, SOLUTION INTRAVENOUS at 10:03

## 2019-03-11 RX ADMIN — SUGAMMADEX 236 MG: 100 INJECTION, SOLUTION INTRAVENOUS at 11:03

## 2019-03-11 RX ADMIN — SODIUM CHLORIDE, SODIUM LACTATE, POTASSIUM CHLORIDE, AND CALCIUM CHLORIDE: 600; 310; 30; 20 INJECTION, SOLUTION INTRAVENOUS at 09:03

## 2019-03-11 RX ADMIN — TRANEXAMIC ACID 1200 MG: 100 INJECTION, SOLUTION INTRAVENOUS at 10:03

## 2019-03-11 RX ADMIN — HYDROMORPHONE HYDROCHLORIDE 0.4 MG: 2 INJECTION INTRAMUSCULAR; INTRAVENOUS; SUBCUTANEOUS at 11:03

## 2019-03-11 RX ADMIN — DEXTROSE MONOHYDRATE AND SODIUM CHLORIDE: 5; .9 INJECTION, SOLUTION INTRAVENOUS at 03:03

## 2019-03-11 RX ADMIN — DEXAMETHASONE SODIUM PHOSPHATE 8 MG: 4 INJECTION, SOLUTION INTRAMUSCULAR; INTRAVENOUS at 10:03

## 2019-03-11 RX ADMIN — ACETAMINOPHEN 1000 MG: 500 TABLET ORAL at 09:03

## 2019-03-11 RX ADMIN — ACETAMINOPHEN 1000 MG: 500 TABLET, FILM COATED ORAL at 09:03

## 2019-03-11 RX ADMIN — CELECOXIB 200 MG: 200 CAPSULE ORAL at 09:03

## 2019-03-11 RX ADMIN — HYDROMORPHONE HYDROCHLORIDE 0.4 MG: 2 INJECTION INTRAMUSCULAR; INTRAVENOUS; SUBCUTANEOUS at 12:03

## 2019-03-11 RX ADMIN — VANCOMYCIN HYDROCHLORIDE 1000 MG: 1 INJECTION, POWDER, FOR SOLUTION INTRAVENOUS at 09:03

## 2019-03-11 RX ADMIN — CARBOXYMETHYLCELLULOSE SODIUM 2 DROP: 2.5 SOLUTION/ DROPS OPHTHALMIC at 10:03

## 2019-03-11 RX ADMIN — FAMOTIDINE 20 MG: 20 TABLET ORAL at 09:03

## 2019-03-11 RX ADMIN — CEFAZOLIN 2 G: 330 INJECTION, POWDER, FOR SOLUTION INTRAMUSCULAR; INTRAVENOUS at 10:03

## 2019-03-11 RX ADMIN — DOCUSATE SODIUM 100 MG: 100 CAPSULE, LIQUID FILLED ORAL at 09:03

## 2019-03-11 RX ADMIN — PHENYLEPHRINE HYDROCHLORIDE 100 MCG: 10 INJECTION INTRAVENOUS at 11:03

## 2019-03-11 RX ADMIN — FAMOTIDINE 20 MG: 20 TABLET, FILM COATED ORAL at 09:03

## 2019-03-11 RX ADMIN — POLYETHYLENE GLYCOL 3350 17 G: 17 POWDER, FOR SOLUTION ORAL at 03:03

## 2019-03-11 RX ADMIN — PROPOFOL 200 MG: 10 INJECTION, EMULSION INTRAVENOUS at 10:03

## 2019-03-11 RX ADMIN — CEFAZOLIN SODIUM 2 G: 2 SOLUTION INTRAVENOUS at 09:03

## 2019-03-11 RX ADMIN — FENTANYL CITRATE 100 MCG: 50 INJECTION, SOLUTION INTRAMUSCULAR; INTRAVENOUS at 10:03

## 2019-03-11 RX ADMIN — VANCOMYCIN HYDROCHLORIDE 1000 MG: 1 INJECTION, POWDER, FOR SOLUTION INTRAVENOUS at 10:03

## 2019-03-11 RX ADMIN — GLYCOPYRROLATE 0.2 MG: 0.2 INJECTION, SOLUTION INTRAMUSCULAR; INTRAVENOUS at 10:03

## 2019-03-11 RX ADMIN — ROCURONIUM BROMIDE 35 MG: 10 INJECTION, SOLUTION INTRAVENOUS at 10:03

## 2019-03-11 RX ADMIN — ONDANSETRON 4 MG: 2 INJECTION INTRAMUSCULAR; INTRAVENOUS at 10:03

## 2019-03-11 RX ADMIN — MUPIROCIN 1 G: 20 OINTMENT TOPICAL at 09:03

## 2019-03-11 NOTE — INTERVAL H&P NOTE
The patient has been examined and the H&P has been reviewed:    I concur with the findings and no changes have occurred since H&P was written.    Anesthesia/Surgery risks, benefits and alternative options discussed and understood by patient/family.          Active Hospital Problems    Diagnosis  POA    Osteoarthritis of right hip [M16.11]  Yes      Resolved Hospital Problems   No resolved problems to display.

## 2019-03-11 NOTE — CONSULTS
Consult Note  Internal Medicine    Consult Requested By: Deonte Lynn MD  Reason for Consult: HTN    SUBJECTIVE:     History of Present Illness:  Patient is a 73 y.o. female seen on floor s/p RTHA. Had some weakness with first trial of PT and unable to complete, but did well with second session. Denies CP, Palps, SOB, Nausea, Vomitting. Chronically has had declinig balace, but no other symptoms to suggest fare of CDPN   .    Past Medical History:   Diagnosis Date    Anxiety     Chronic inflammatory demyelinating neuropathy     Depression     Diarrhea     Encounter for blood transfusion     GERD (gastroesophageal reflux disease)     [place] (hard of hearing)     Hyperlipidemia     Hypertension     IBS (irritable bowel syndrome)     Neuromuscular disorder     demyelinating neuropathy    Ulcer     several years ago     Past Surgical History:   Procedure Laterality Date    BREAST SURGERY       SECTION      COSMETIC SURGERY      ECTOPIC PREGNANCY SURGERY      TONSILLECTOMY       Family History   Problem Relation Age of Onset    Diabetes Mother     Hypertension Father     Heart disease Father     Diabetes Brother     Heart attack Brother     Heart disease Brother      Social History     Tobacco Use    Smoking status: Former Smoker     Last attempt to quit: 2014     Years since quittin.2    Smokeless tobacco: Never Used    Tobacco comment: 1st [id number];quit again 2014   Substance Use Topics    Alcohol use: Yes     Comment: 1-2 glasses of wine every evening    Drug use: No       Medications Prior to Admission   Medication Sig Dispense Refill Last Dose    fluticasone (FLONASE) 50 mcg/actuation nasal spray USE 2 SPRAYS IN EACH NOSTRIL DAILY. (Patient taking differently: pt states she uses as needed) 16 g 2 Past Month at Unknown time    hydroCHLOROthiazide (HYDRODIURIL) 25 MG tablet TAKE 1 TABLET (25 MG TOTAL) BY MOUTH ONCE DAILY. 90 tablet 3 3/10/2019 at 0900    L.  rhamnosus GG/inulin (CULTURELLE PROBIOTICS ORAL) Take 1 tablet by mouth once daily.   3/10/2019 at 0900    omeprazole (PRILOSEC) 20 MG capsule Take 20 mg by mouth Daily.    Past Week at Unknown time    peppermint oil (IBGARD ORAL) Take by mouth.   3/10/2019 at 0900    simethicone (MYLICON) 80 MG chewable tablet Take 80 mg by mouth every 6 (six) hours as needed for Flatulence.   Past Week at Unknown time       Review of patient's allergies indicates:   Allergen Reactions    Sulfa (sulfonamide antibiotics) Other (See Comments)     shaking        Review of Systems:  Constitutional: No fever or chills, no weight changes.  Eyes: No visual changes or photophobia  HEENT: No nasal congestion or sore throat  Respiratory: No cough or shorness of breath  Cardiovascular: No chest pain or palpitations  Gastrointestinal: Good appetite, no nausea or vomiting, no change in bowel habits  Genitourinary: No hematuria or dysuria  Skin: No rash or pruritis  Hematologic/lymphatic: No easy bruising, bleeding or lymphadenopathy  Musculoskeletal: No arthralgias or myalgias  Neurological: No seizures or tremors  Endocrine: No heat/cold intolerance.  No polyuria/polydipsia.  Psychiatric:  No depression or anxiety.     OBJECTIVE:     Vital Signs (Most Recent)  Temp: 97.5 °F (36.4 °C) (03/11/19 1325)  Pulse: 62 (03/11/19 1429)  Resp: 18 (03/11/19 1429)  BP: (!) 110/59 (03/11/19 1340)  SpO2: 98 % (03/11/19 1429)    Vital Signs Range (Last 24H):  Temp:  [97.4 °F (36.3 °C)-97.5 °F (36.4 °C)]   Pulse:  [55-66]   Resp:  [16-18]   BP: (100-131)/(54-77)   SpO2:  [97 %-100 %]     Physical Exam:    General appearance: Well developed, well nourished  HEENT: Normocephalic, atraumatic,  Conjunctivae nl. Sclera anicteric. PERRL. and oropharynx clear.  Neck: Supple, trachea midline, thyroid not enlarged,   Lungs: Clear to auscultation bilaterally and normal respiratory effort  Heart: Regular rate and rhythm, S1, S2 normal, no murmur, click, rub or  gallop  Abdomen: Soft, non-tender non-distended; bowel sounds normal; no masses,  no organomegaly  Extremities: No cyanosis or clubbing. No edema, right hip dressed, chronic contractures of hands bilaterally  Pulses: 2+ and symmetric  Neurologic: Normal strength and tone. No focal numbness or weakness       Diagnostic Results:  Labs: Reviewed  X-Ray: Reviewed    ASSESSMENT/PLAN:     1. HTN-continue home meds with hold parameters  2. GERD-Continue PPI  3. 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 Allergic Rhinnitis-Continue Flonase prn  5. OA s/p RTHA-per ortho  6. DVT Prophylaxis- q12 per ortho

## 2019-03-11 NOTE — OR NURSING
Pt states she has a rash nnamdi groin area. Dr. Lynn called and notified.  Dr. Lynn states to proceed with preop.

## 2019-03-11 NOTE — OP NOTE
[facility]  Operative Report    SUMMARY     Surgery Date: 3/11/2019     Surgeon(s) and Role:     * Deonte Lynn MD - Primary    Assistant: BARBARA Stallworth FA    Pre-op Diagnosis:  Primary osteoarthritis of one hip, right [M16.11]    Post-op Diagnosis:  Post-Op Diagnosis Codes:     * Primary osteoarthritis of one hip, right [M16.11]    Procedure(s) (LRB):  ARTHROPLASTY, HIP (Right) (Yadi TM stem/Trilogy cup)    Anesthesia: Spinal    Description of Procedure: Appropriate consent was signed. The patient understood   and accepted all risks and complications. The patient was brought to the Operating Room after undergoing spinal anesthetic. Pérez catheter was placed. The patient was then placed in a lateral decubitus position on a peg board with all bony   prominences padded. Perineal drape was applied. The Operative hip and lower extremity were then prepped and draped in a sterile manner and a mini posterior approach was utilized. Dissection was taken down to fascia, which was incised and   gluteus loulou muscle split in line of its fibers.The Charnley retractor was then   placed and the hip internally rotated. The external rotators and capsule were   taken off as one flap and peeled back to protect the sciatic nerve. It was   tagged with #5 Ethibond suture. Leg was then levelled and 2 pins were placed, 1  in the greater trochanter and 1 in the iliac crest and distance measured 9.0  cm. Pin was then removed from greater trochanter and hip dislocated. A femoral  neck osteotomy was performed in 15 mm above the lesser trochanter as   templated on preoperative x-rays. Femoral head was removed and proximal femur   was exposed. It was opened up with the cookie cutter, starter reamer and   lateralizing reamer. Trabecular metal reamers were utilized up to 12 mm and   broaching was performed to 12 mm. A thrombin-soaked sponge is placed in the   proximal femur and attention was then directed to the  acetabulum.    Labrum and soft tissue were excised and reaming was begun with a 46 mm reamer   and progressed to 49 mm. A 50 mm press-fit Trilogy cup with cluster   holes was then impacted obtaining excellent fixation. 2 dome screws were placed  enhancing fixation. A 36 mm neutral highly cross-linked polyethylene liner was  then snapped in the place and checked for loosening. Osteophytes were removed   from around the acetabulum.    Attention was then directed back to the proximal femur where the trial 12 mm   trabecular metal broach was placed and trials were performed. A standard neck   was required along with a 36 mm head plus 0 mm neck. Leg length measured 9.0   cm. Broach is then removed and a 12 mm  trabecular metal stem was then impacted in the proximal femur obtaining excellent fixation. A 36 mm   cobalt chrome head +0 mm neck was then impacted on the dried trunnion   relocated brought through full range of motion and found to be quite stable.   The hip was then washed out copiously with antibiotic solution through the   Pulsavac. The external rotators and capsule were reattached to trochanteric   attachment through drill holes utilizing #5 Ethibond suture. Exparel cocktail   was injected into the fascia and subcutaneous tissue. Fascia was closed with a   running #2 Quill suture. Subcutaneous closure was obtained with #1 and 2-0   Vicryl. Skin was then closed with staples and a sterile compressive dressing   was applied. The patient was placed in abduction pillow and brought to Recovery  Room in good condition.    Estimated Blood Loss: 300cc         Specimens:   Specimen (12h ago, onward)    None

## 2019-03-11 NOTE — PLAN OF CARE
Problem: Physical Therapy Goal  Goal: Physical Therapy Goal  Goals to be met by: 3/25/19     Patient will increase functional independence with mobility by performin. Supine to sit with CGA  2. Sit to supine with CGA  3. Sit<>stand transfer with SBA using rolling walker.   4. Gait > 150 feet with SBA using rolling walker.   5. Ascend/descend 4 stairs with L handrails with min A  6.  Pt able to name 3/3 precautions    Outcome: Ongoing (interventions implemented as appropriate)  PT eval.  Pt given IV Dilaudid in PACU before arrival to [facility].  Pt is sedated and only able to get from bed to chair with assist.  Having hard time staying awake.  May need both sessions tomorrow before dc  REC:  Home with HH  DME:  [facility] , 3 in 1 SC(refusing) and hip kit

## 2019-03-11 NOTE — PLAN OF CARE
Patient accepted by [facility]. Umesh from [facility] gave approval to pull rolling walker & bedside commode from our supply - [employer] to deliver to bedside      03/11/19 8966   Final Note   Assessment Type Final Discharge Note   Anticipated Discharge Disposition Home-[facility]   What phone number can be called within the next 1-3 days to see how you are doing after discharge? [phone]   Discharge plans and expectations educations in teach back method with documentation complete? Yes   Right Care Referral Info   Post Acute Recommendation Home-care   Facility Name [facility]

## 2019-03-11 NOTE — PLAN OF CARE
IVETH met at pt at bedside to complete discharge assessment, verified PCP and uses [facility] on [address].  Pt needs RW, BSC and hip kit, long, agreed to pay $42, but doesn't want SC/TTB.  IVETH gave pt a list of  agencies and would like to be placed with Dr. Lynn's preferred provider and spouse signed choice form and will drive pt home upon discharge.     03/11/19 1501   Discharge Assessment   Assessment Type Discharge Planning Assessment   Confirmed/corrected address and phone number on facesheet? Yes   Assessment information obtained from? Patient;Caregiver   Communicated expected length of stay with patient/caregiver no   Prior to hospitilization cognitive status: Alert/Oriented   Prior to hospitalization functional status: Independent   Current cognitive status: Alert/Oriented   Current Functional Status: Assistive Equipment;Needs Assistance   Lives With spouse   Able to Return to Prior Arrangements yes   Is patient able to care for self after discharge? Unable to determine at this time (comments)   Readmission Within the Last 30 Days no previous admission in last 30 days   Patient currently being followed by outpatient case management? No   Patient currently receives any other outside agency services? No   Equipment Currently Used at Home none   Do you have any problems affording any of your prescribed medications? No   Is the patient taking medications as prescribed? yes   Does the patient have transportation home? Yes   Transportation Anticipated family or friend will provide   Does the patient receive services at the [facility]? No   Discharge Plan A (Home Health)   DME Needed Upon Discharge  bedside commode;walker, rolling;hip kit   Patient/Family in Agreement with Plan yes

## 2019-03-11 NOTE — PT/OT/SLP EVAL
"Physical Therapy Evaluation and treatment    Patient Name:  Cecilia Munguia   MRN:  [id number]    Recommendations:     Discharge Recommendations:  other (see comments)(home with HH)   Discharge Equipment Recommendations: commode, walker, rolling, shower chair(hip kit)   Barriers to discharge: None    Assessment:     Cecilia Munguia is a 73 y.o. female admitted with a medical diagnosis of Osteoarthritis of right hip.  She presents with the following impairments/functional limitations:  weakness, impaired endurance, impaired functional mobilty, gait instability, decreased safety awareness, decreased lower extremity function, decreased ROM, orthopedic precautions, impaired sensation, impaired fine motor, decreased upper extremity function .  Pt is still sedated from IV meds given in PACU-unable to safely amb.  May need 2 PT sessions tomorrow.     Rehab Prognosis: Good; patient would benefit from acute skilled PT services to address these deficits and reach maximum level of function.    Recent Surgery: Procedure(s) (LRB):  ARTHROPLASTY, HIP (Right) Day of Surgery    Plan:     During this hospitalization, patient to be seen BID to address the identified rehab impairments via gait training, therapeutic activities, therapeutic exercises and progress toward the following goals:    · Plan of Care Expires:  04/10/19    Subjective     Chief Complaint: sleepy  Patient/Family Comments/goals: PLOF.  "I know I cant walk right now. I cant keep my eyes open"   Pain/Comfort:  · Pain Rating 1: 0/10  · Location - Side 1: Right  · Location - Orientation 1: generalized  · Location 1: hip  · Pain Addressed 1: Pre-medicate for activity, Nurse notified  · Pain Rating Post-Intervention 1: 0/10    Patients cultural, spiritual, Jain conflicts given the current situation: no(NONE STATED )    Living Environment:  Pt lives with spouse in 1 story house with 4 PATRICIA and L HR.  Has shower stall and standard toilet  Prior to admission, patients " level of function was mod I for ADL and amb without AD.  Pt states she has trouble with small buttons and spouse will put in her earrings 2* hand deformities.  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  Equipment used at home: none.  DME owned (not currently used): none.  Upon discharge, patient will have assistance from spouse-he works but will be taking time off.    Objective:     Communicated with nurse prior to session.  Patient found asleep in bed with  hip abduction pillow, jimenez catheter, peripheral IV, SCD, oxygen  upon PT entry to room.    General Precautions: Standard, fall   Orthopedic Precautions:RLE weight bearing as tolerated, RLE posterior precautions   Braces: N/A     Exams:  · Cognitive Exam:  Patient is oriented to Person, Place, Time and Situation.  Pt is sedated, having hard time keeping eyes open and following directions.    · Gross Motor Coordination:  impaired  · Postural Exam:  Patient presented with the following abnormalities:    · -       Rounded shoulders  · -       Deformities noted in hands  · Sensation: neuropathy in B feet > lower legs and also in B hands   · Skin Integrity/Edema:      · -       Skin integrity: R hip incision bandaged  · -       Edema: R hip and thigh  · RLE ROM: Deficits: limited by hip precautions.  Ankle WFL  · RLE Strength: Deficits: fair quad and glut set, assist to move leg, ankle 3+  · LLE ROM: WFL  · LLE Strength: WFL except ankle 3-/5    Functional Mobility:  · Bed Mobility:     · Supine to Sit: moderate assistance  · Transfers:     · Sit to Stand:  minimum assistance with rolling walker  · SPT: min to mod A from bed to chair-needed mod a for walker management and max cueing for sequence,  Stimulation to keep eyes open.    · Gait: unable to amb 2* sedated, SPT only to chair  · Balance: poor standing      Therapeutic Activities and Exercises:   PT eval.  Instructed pt and spouse in hip precautions.  Pt falling asleep during instruction  and requiring tactile cues to keep eyes open.  Assist to EOB.  Stood with RW and SPT to chair.    SPT to chair.      AM-PAC 6 CLICK MOBILITY  Total Score:11     Patient left up in chair with all lines intact, call button in reach, nurse notified and spouse present.    GOALS:   Multidisciplinary Problems     Physical Therapy Goals        Problem: Physical Therapy Goal    Goal Priority Disciplines Outcome Goal Variances Interventions   Physical Therapy Goal     PT, PT/OT Ongoing (interventions implemented as appropriate)     Description:  Goals to be met by: 3/25/19     Patient will increase functional independence with mobility by performin. Supine to sit with CGA  2. Sit to supine with CGA  3. Sit<>stand transfer with SBA using rolling walker.   4. Gait > 150 feet with SBA using rolling walker.   5. Ascend/descend 4 stairs with L handrails with min A  6.  Pt able to name 3/3 precautions                      History:     Past Medical History:   Diagnosis Date    Anxiety     Chronic inflammatory demyelinating neuropathy     Depression     Diarrhea     Encounter for blood transfusion     GERD (gastroesophageal reflux disease)     [place] (hard of hearing)     Hyperlipidemia     Hypertension     IBS (irritable bowel syndrome)     Neuromuscular disorder     demyelinating neuropathy    Ulcer     several years ago       Past Surgical History:   Procedure Laterality Date    BREAST SURGERY       SECTION      COSMETIC SURGERY      ECTOPIC PREGNANCY SURGERY      TONSILLECTOMY         Time Tracking:     PT Received On: 19  PT Start Time: 1530     PT Stop Time: 1608  PT Total Time (min): 38 min     Billable Minutes: Evaluation 15 and Therapeutic Activity 23      Mile Shi, PT  2019

## 2019-03-11 NOTE — ANESTHESIA POSTPROCEDURE EVALUATION
"Anesthesia Post Evaluation    Patient: Cecilia Munguia    Procedure(s) Performed: Procedure(s) (LRB):  ARTHROPLASTY, HIP (Right)    Final Anesthesia Type: general  Patient location during evaluation: PACU  Patient participation: Yes- Able to Participate  Level of consciousness: awake and alert and oriented  Post-procedure vital signs: reviewed and stable  Pain management: adequate  Airway patency: patent  PONV status at discharge: No PONV  Anesthetic complications: no      Cardiovascular status: blood pressure returned to baseline and hemodynamically stable  Respiratory status: unassisted, spontaneous ventilation and room air  Hydration status: euvolemic  Follow-up not needed.        Visit Vitals  /62   Pulse (!) 55   Temp 36.3 °C (97.4 °F) (Oral)   Resp 16   Ht 5' 6" (1.676 m)   Wt 59 kg (130 lb)   SpO2 100%   Breastfeeding? No   BMI 20.98 kg/m²       Pain/Tiffany Score: Pain Rating Prior to Med Admin: 7 (3/11/2019 12:05 PM)  Pain Rating Post Med Admin: 6 (3/11/2019 12:35 PM)        "

## 2019-03-11 NOTE — PLAN OF CARE
Problem: Adult Inpatient Plan of Care  Goal: Plan of Care Review  Outcome: Ongoing (interventions implemented as appropriate)  Pt on RA IS done.

## 2019-03-11 NOTE — TRANSFER OF CARE
"Anesthesia Transfer of Care Note    Patient: Cecilia Munguia    Procedure(s) Performed: Procedure(s) (LRB):  ARTHROPLASTY, HIP (Right)    Patient location: PACU    Anesthesia Type: general    Transport from OR: Transported from OR on 2-3 L/min O2 by NC with adequate spontaneous ventilation    Post pain: adequate analgesia    Post assessment: no apparent anesthetic complications    Post vital signs: stable    Level of consciousness: awake and alert    Nausea/Vomiting: no nausea/vomiting    Complications: none    Transfer of care protocol was followed      Last vitals:   Visit Vitals  /73 (BP Location: Left arm, Patient Position: Lying)   Pulse 60   Temp 36.3 °C (97.4 °F) (Oral)   Resp 16   Ht 5' 6" (1.676 m)   Wt 59 kg (130 lb)   SpO2 99%   Breastfeeding? No   BMI 20.98 kg/m²     "

## 2019-03-11 NOTE — PLAN OF CARE
[facility]       [address]       [facility] [address]       [phone]               [facility] Discharge Orders    Home Swapnil           Expected Discharge Date: 3/12    Diagnoses:  Post-op  right hip(s) replacement    Patient is homebound due to:   Pt requires home health services due to taxing effort to leave the home as a result of immobility from Post-op right hip(s) replacement    Weight Bearing Status:   full weight bearing: right leg     Posterior Hip Precautions for 6 weeks, AVOID:  -Avoid greater than 90 degrees of flexion, internal rotation, and adduction  -Avoid extension, external rotation, and abduction  -Must sleep with hip abduction pillow or regular pillow b/t legs    Physical Therapy   3 times a week for 2 weeks (Call for further orders)  - Ambulate with a rolling walker  - Progress to cane  -Instruct on ROM and strengthening of knee  -Set up for outpt once staples removed and MOD 1 with cane    Wound Care:   If patient is discharged with aqua mauricio/silver dressing, leave on for 5 days unless saturated border to border, then follow instructions below:  Cleanse with wound cleanser or normal saline and apply island dressing.  If island dressing not available apply gauze and tegaderm.  Change surgical dressing 3 times a week and PRN  in standing position.  Teach patient to change daily if draining.  Pt may shower if surgical dressing is waterproof.. Removal and replacement of dressing after shower only needed if incision is suspected to have gotten wet during shower.  Otherwise change as previously described depending on dressing/drainage. No soaking in the tub or hot tub  for 6 weeks.    Wear  TEDS Bilateral Knee High Stockings for 3  Weeks. OK to remove stockings 1-2 hours/day max if desired.    PT/SN to remove staples 14 days Post-op and apply skin prep and steri-strips.    Cold therapy/Ice: encouraged at least 20 minutes 2-3 times daily or more if desired.   Incision must be kept waterproof while icing.      Contact:  Please contact the nurse practitioner, Kristina at [phone] at Ext [phone]. with concerns.  She is in surgery M,W,F so if urgent and needs to be addressed prior to the end of the day call the  and they with contact her in the OR or Clinic.     BLOOD THINNER:    If sent home on Xarelto         -14 days post-op for TKR       -30 days post-op for THR     If sent home home on ASA    325mg   BID x 4 weeks     Once finished with prescribed blood thinner, patients can return to pre-surgical ASA dosage if they took ASA before surgery.     Outpatient Therapy: [facility]    [address] [address]   or  [address] [address]    Call [phone] to schedule appointment  Fax [phone]    If need orders: Call Lynnette at Ext [phone]        DME:  - rolling Walker  - 3 in 1 commode  - tub bench / shower chair  - Hip Kit  - Per PT/OT recommendation  - Other:Thompson Lynn

## 2019-03-12 VITALS
RESPIRATION RATE: 18 BRPM | BODY MASS INDEX: 20.89 KG/M2 | WEIGHT: 130 LBS | OXYGEN SATURATION: 96 % | DIASTOLIC BLOOD PRESSURE: 60 MMHG | SYSTOLIC BLOOD PRESSURE: 108 MMHG | HEART RATE: 56 BPM | TEMPERATURE: 98 F | HEIGHT: 66 IN

## 2019-03-12 LAB
ERYTHROCYTE [DISTWIDTH] IN BLOOD BY AUTOMATED COUNT: 13.7 %
HCT VFR BLD AUTO: 35.5 %
HGB BLD-MCNC: 11.8 G/DL
MCH RBC QN AUTO: 31.3 PG
MCHC RBC AUTO-ENTMCNC: 33.2 G/DL
MCV RBC AUTO: 94 FL
PLATELET # BLD AUTO: 217 K/UL
PMV BLD AUTO: 9.4 FL
RBC # BLD AUTO: 3.77 M/UL
WBC # BLD AUTO: 8.82 K/UL

## 2019-03-12 PROCEDURE — 97165 OT EVAL LOW COMPLEX 30 MIN: CPT

## 2019-03-12 PROCEDURE — 97530 THERAPEUTIC ACTIVITIES: CPT

## 2019-03-12 PROCEDURE — 63600175 PHARM REV CODE 636 W HCPCS

## 2019-03-12 PROCEDURE — 25000003 PHARM REV CODE 250

## 2019-03-12 PROCEDURE — 97116 GAIT TRAINING THERAPY: CPT

## 2019-03-12 PROCEDURE — 25000003 PHARM REV CODE 250: Performed by: INTERNAL MEDICINE

## 2019-03-12 PROCEDURE — 97535 SELF CARE MNGMENT TRAINING: CPT

## 2019-03-12 PROCEDURE — 85027 COMPLETE CBC AUTOMATED: CPT

## 2019-03-12 PROCEDURE — 36415 COLL VENOUS BLD VENIPUNCTURE: CPT

## 2019-03-12 PROCEDURE — 97110 THERAPEUTIC EXERCISES: CPT

## 2019-03-12 RX ORDER — ASPIRIN 325 MG
325 TABLET ORAL EVERY 12 HOURS
Qty: 60 TABLET | Refills: 0 | Status: SHIPPED | OUTPATIENT
Start: 2019-03-12 | End: 2019-06-12

## 2019-03-12 RX ORDER — OXYCODONE AND ACETAMINOPHEN 5; 325 MG/1; MG/1
TABLET ORAL
Qty: 60 TABLET | Refills: 0 | Status: SHIPPED | OUTPATIENT
Start: 2019-03-12 | End: 2019-06-12

## 2019-03-12 RX ADMIN — FAMOTIDINE 20 MG: 20 TABLET ORAL at 11:03

## 2019-03-12 RX ADMIN — ASPIRIN 325 MG ORAL TABLET 325 MG: 325 PILL ORAL at 11:03

## 2019-03-12 RX ADMIN — CEFAZOLIN SODIUM 2 G: 2 SOLUTION INTRAVENOUS at 02:03

## 2019-03-12 RX ADMIN — SODIUM CHLORIDE, SODIUM LACTATE, POTASSIUM CHLORIDE, AND CALCIUM CHLORIDE 250 ML: .6; .31; .03; .02 INJECTION, SOLUTION INTRAVENOUS at 12:03

## 2019-03-12 RX ADMIN — DOCUSATE SODIUM 100 MG: 100 CAPSULE, LIQUID FILLED ORAL at 11:03

## 2019-03-12 RX ADMIN — POLYETHYLENE GLYCOL 3350 17 G: 17 POWDER, FOR SOLUTION ORAL at 11:03

## 2019-03-12 RX ADMIN — MUPIROCIN 1 G: 20 OINTMENT TOPICAL at 11:03

## 2019-03-12 RX ADMIN — ACETAMINOPHEN 1000 MG: 500 TABLET ORAL at 06:03

## 2019-03-12 RX ADMIN — OXYCODONE HYDROCHLORIDE 5 MG: 5 TABLET ORAL at 06:03

## 2019-03-12 RX ADMIN — DEXTROSE MONOHYDRATE AND SODIUM CHLORIDE: 5; .9 INJECTION, SOLUTION INTRAVENOUS at 02:03

## 2019-03-12 NOTE — PT/OT/SLP DISCHARGE
Physical Therapy Discharge Summary    Name: Cecilia Munguia  MRN: [id number]   Principal Problem: Osteoarthritis of right hip     Patient Discharged from acute Physical Therapy on 3/12/19.  Please refer to prior PT noted date on 3/12/19 for functional status.     Assessment:     Goals partially met.    Objective:     GOALS:   Multidisciplinary Problems     Physical Therapy Goals        Problem: Physical Therapy Goal    Goal Priority Disciplines Outcome Goal Variances Interventions   Physical Therapy Goal     PT, PT/OT Ongoing (interventions implemented as appropriate)     Description:  Goals to be met by: 3/25/19     Patient will increase functional independence with mobility by performin. Supine to sit with CGA-MET 3/12/19  2. Sit to supine with CGA-MET 3/12/19  3. Sit<>stand transfer with SBA using rolling walker.   4. Gait > 150 feet with SBA using rolling walker.   5. Ascend/descend 4 stairs with L handrails with min A-MET 3/12/19  6.  Pt able to name 3/3 precautions-MET 3/12/19                       Reasons for Discontinuation of Therapy Services  Transfer to alternate level of care.      Plan:     Patient Discharged to: Home with Home Health Service.  PT of record not available for documentation.      Celia Lazo, PT  3/12/2019

## 2019-03-12 NOTE — PROGRESS NOTES
"Progress Note  Orthopedics    Admit Date: 3/11/2019   Patient ID: Cecilia Munguia is a 73 y.o. female. POD#1 R THR.  Doing well, dressing dry, NV intact.  Amb 50 ft yesterday.  PT today and DC to home.            Deonte Lynn      Vital Sign (recent):  BP (!) 108/57 (BP Location: Right arm, Patient Position: Lying)   Pulse (!) 54   Temp 98 °F (36.7 °C) (Oral)   Resp 18   Ht 5' 6" (1.676 m)   Wt 59 kg (130 lb)   SpO2 96%   Breastfeeding? No   BMI 20.98 kg/m²       Laboratory:    CBC:   Recent Labs   Lab 03/12/19  0446   WBC 8.82   RBC 3.77*   HGB 11.8*   HCT 35.5*      MCV 94   MCH 31.3*   MCHC 33.2       CMP: No results for input(s): GLU, CALCIUM, ALBUMIN, PROT, NA, K, CO2, CL, BUN, CREATININE, ALKPHOS, ALT, AST, BILITOT in the last 168 hours.        Intake/Output Summary (Last 24 hours) at 3/12/2019 0820  Last data filed at 3/12/2019 0646  Gross per 24 hour   Intake 2943.33 ml   Output 1550 ml   Net 1393.33 ml         Current Medications:   acetaminophen  1,000 mg Oral Q8H    aspirin  325 mg Oral Q12H    docusate sodium  100 mg Oral Q12H    famotidine  20 mg Oral BID    mupirocin  1 g Nasal BID    polyethylene glycol  17 g Oral Daily       Continuous Infusions:   dextrose 5 % and 0.9 % NaCl 100 mL/hr at 03/12/19 0220     PRN Meds:.bisacodyl, diphenhydrAMINE, fluticasone, HYDROmorphone, ondansetron, oxyCODONE, oxyCODONE, promethazine (PHENERGAN) IVPB, sodium chloride 0.9%, sodium chloride 0.9%  "

## 2019-03-12 NOTE — PT/OT/SLP PROGRESS
Physical Therapy Treatment    Patient Name:  Cecilia Munguia   MRN:  [id number]    Recommendations:     Discharge Recommendations:  (HHPT/OT)   Discharge Equipment Recommendations: commode, walker, rolling, shower chair(hip kit)   Barriers to discharge: None    Assessment:     Cecilia Munguia is a 73 y.o. female admitted with a medical diagnosis of Osteoarthritis of right hip.  She presents with the following impairments/functional limitations:  weakness, impaired self care skills, impaired functional mobilty, gait instability, impaired balance, decreased lower extremity function, decreased ROM, edema, impaired skin, pain, orthopedic precautions, decreased upper extremity function ;pt with good mobility today, NO LOB or SOB noted, had some difficulty with getting in/out of bed, though reviewed t/f's extensively with visual demonstration and pt performing.    Rehab Prognosis: Good; patient would benefit from acute skilled PT services to address these deficits and reach maximum level of function.    Recent Surgery: Procedure(s) (LRB):  ARTHROPLASTY, HIP (Right) 1 Day Post-Op    Plan:     During this hospitalization, patient to be seen BID to address the identified rehab impairments via gait training, therapeutic activities, therapeutic exercises and progress toward the following goals:    · Plan of Care Expires:  04/10/19    Subjective     Chief Complaint: some hip pain with amb.  Patient/Family Comments/goals: pt agreeable to session,  present for instruction as well  Pain/Comfort:  · Pain Rating 1: 0/10(at rest)  · Location - Side 1: Right  · Location - Orientation 1: generalized  · Location 1: hip  · Pain Addressed 1: Pre-medicate for activity, Reposition, Distraction, Nurse notified  · Pain Rating Post-Intervention 1: 4/10(with amb)      Objective:     Communicated with nurse prior to session.  Patient found sitting up in chair with peripheral IV  upon PT entry to room.     General Precautions: Standard, fall    Orthopedic Precautions:RLE weight bearing as tolerated, RLE posterior precautions   Braces: N/A     Functional Mobility:  · Bed Mobility:     · Supine to Sit: stand by assistance, contact guard assistance and at RLE  · Sit to Supine: stand by assistance, contact guard assistance and at RLE   · Transfers:     · Sit to Stand:  stand by assistance and contact guard assistance with rolling walker and cueing for keeping hip prec.  · Gait: amb'd 200' with RW and CGA/SBA  · Stairs:  Pt ascended/descended 4 stair(s) with No Assistive Device with left handrail with Stand-by Assistance.       AM-PAC 6 CLICK MOBILITY  Turning over in bed (including adjusting bedclothes, sheets and blankets)?: 4  Sitting down on and standing up from a chair with arms (e.g., wheelchair, bedside commode, etc.): 3  Moving from lying on back to sitting on the side of the bed?: 4  Moving to and from a bed to a chair (including a wheelchair)?: 3  Need to walk in hospital room?: 3  Climbing 3-5 steps with a railing?: 3  Basic Mobility Total Score: 20       Therapeutic Activities and Exercises:   pt perf'd supine LE ex's of AP's, QS,GS, seated LAQ's x 10 ea.   inst'd pt and  in post hip prec., safety in/around home, car t/f's    Patient left up in chair with all lines intact, call button in reach, nurse notified and  present..    GOALS:   Multidisciplinary Problems     Physical Therapy Goals        Problem: Physical Therapy Goal    Goal Priority Disciplines Outcome Goal Variances Interventions   Physical Therapy Goal     PT, PT/OT Ongoing (interventions implemented as appropriate)     Description:  Goals to be met by: 3/25/19     Patient will increase functional independence with mobility by performin. Supine to sit with CGA  2. Sit to supine with CGA  3. Sit<>stand transfer with SBA using rolling walker.   4. Gait > 150 feet with SBA using rolling walker.   5. Ascend/descend 4 stairs with L handrails with min A  6.  Pt able to  name 3/3 precautions                      Time Tracking:     PT Received On: 03/12/19  PT Start Time: 1047     PT Stop Time: 1158  PT Total Time (min): 71 min     Billable Minutes: Gait Training 30, Therapeutic Activity 26 and Therapeutic Exercise 15    Treatment Type: Treatment  PT/PTA: PTA     PTA Visit Number: 1     Rosario Griffiths PTA  03/12/2019

## 2019-03-12 NOTE — PT/OT/SLP PROGRESS
Occupational Therapy  Not Seen    Cecilia Munguia   MRN: [id number]     Patient not seen for Occupational Therapy today due to ( ) departmental protocol for elective surgery patients.    Patient with Primary osteoarthritis of one hip, right [M16.11], s/p Procedure(s):  ARTHROPLASTY, HIP 3/11/2019 who will be seen for Occupational Therapy evaluation POD#1.    Serene Way, OTR/L   3/11/2019

## 2019-03-12 NOTE — PT/OT/SLP DISCHARGE
Occupational Therapy Discharge Summary    Cecilia Munguia  MRN: [id number]   Principal Problem: Osteoarthritis of right hip      Patient Discharged from acute Occupational Therapy on 3/12/2019.  Please refer to prior OT note dated 3/12/2019 for functional status.    Assessment:      Patient appropriate for care in another setting.    Objective:     GOALS:   Multidisciplinary Problems     Occupational Therapy Goals        Problem: Occupational Therapy Goal    Goal Priority Disciplines Outcome Interventions   Occupational Therapy Goal     OT, PT/OT     Description:  Goals to be met by: 3/19/2019     Patient will increase functional independence with ADLs by performing:    LE Dressing with Stand-by Assistance.  Grooming while standing with Supervision.  Toileting from bedside commode with Supervision for hygiene and clothing management.   Toilet transfer to bedside commode with Supervision.                      Reasons for Discontinuation of Therapy Services  Transfer to alternate level of care.      Plan:     Patient Discharged to: Home with Home Health Service    PRAVEEN Hernandez  3/12/2019

## 2019-03-12 NOTE — PT/OT/SLP EVAL
Occupational Therapy   Evaluation & Treatment    Name: Cecilia Munguia  MRN: [id number]  Admitting Diagnosis:  Osteoarthritis of right hip 1 Day Post-Op    Recommendations:     Discharge Recommendations: (home with home health)  Discharge Equipment Recommendations:  commode, walker, rolling, shower chair(hip kit)  Barriers to discharge:  (4STE home)    Assessment:     Cecilia Munguia is a 73 y.o. female with a medical diagnosis of Osteoarthritis of right hip.  She presents with the following performance deficits affecting function: weakness, impaired self care skills, impaired functional mobilty, gait instability, decreased ROM, decreased lower extremity function, decreased safety awareness, impaired endurance, impaired fine motor, impaired balance.  Pt demonstrates strength and ROM in (B) UE needed for ADLs that is WFL.  Some deformities noted in both hands; however, pt able to maneuver RW and perform ADLs without problem during evaluation.  Moderate assistance required for donning pants with reacher while seated EOB with pt able to don underwear with SBA.  Cues required for RW management and to promote safety this date while pt ambulating with CGA and RW.  PTA pt reports being (I) with ADLs and mobility.  Pt would benefit from skilled OT services to address problems listed below and maximize independence with ADLs.  She is eager to return home and continue painting.  Home health is recommended upon d/c from acute care to further address deficits and help pt improve overall functional independence.     Rehab Prognosis: Good; patient would benefit from acute skilled OT services to address these deficits and reach maximum level of function.       Plan:     Patient to be seen daily to address the above listed problems via self-care/home management, therapeutic activities, therapeutic exercises  · Plan of Care Expires:    · Plan of Care Reviewed with: patient    Subjective     Chief Complaint: Impaired  balance  Patient/Family Comments/goals: Return home; resume painting    Occupational Profile:  Living Environment: Pt lives with  in [facility], 4STE, HR present on L side.  Bathroom contains shower stall with hand held shower.  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    Previous level of function: Pt reports being (I) with ADLs and mobility.  She has some difficulty with buttons and earrings 2* deformities in hands.    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  Equipment Used at Home:  none  Assistance upon Discharge:  able to provide assist    Pain/Comfort:  · Pain Rating 1: 0/10  · Pain Rating Post-Intervention 1: 0/10    Patients cultural, spiritual, Hoahaoism conflicts given the current situation:  None    Objective:     Communicated with: RN prior to session.  Patient found HOB elevated with hip abduction pillow, peripheral IV, SCD(IV not connected) upon OT entry to room.   present for 2nd half of session.    General Precautions: Standard, fall   Orthopedic Precautions:RLE weight bearing as tolerated, RLE posterior precautions   Braces: N/A     Occupational Performance:    Bed Mobility:    · Patient completed Scooting/Bridging with contact guard assistance  · Patient completed Supine to Sit with contact guard assistance    Functional Mobility/Transfers:  · Patient completed Sit <> Stand Transfer with minimum assistance  with  rolling walker x 2 trials from EOB; x 1 trial from chair  · Toilet:  Min A and RW x 1 trial from BSC placed over toilet.    · Functional Mobility: Pt ambulated 30 ft within room with CGA and RW.  No instances of LOB noted.  Cues required for RW management 2* pt placing RW too far away.    Activities of Daily Living:  · Feeding:  independence while seated up in chair.  · Grooming: stand by assistance for washing hands while standing at sink with RW.  · Upper Body Dressing: independence for donning  bra and shirt while seated at EOB.  · Lower Body Dressing: stand by assistance for donning underwear with reacher while seated EOB.  Mod A required to don pants with reacher.  Pt began task in seated position, then stood once to pull underwear and pants up.  · Toileting: stand by assistance from BSC.  OT provided support for (B) UE 2* height slightly higher once placed over toilet.  Education provided on importance of both feet being able to be on ground during task when using BSC at home.      Cognitive/Visual Perceptual:  Cognitive/Psychosocial Skills:    -       Oriented to: Person, Place, Time and Situation   -       Follows Commands/attention:Follows multistep  commands  -       Communication: clear/fluent  -       Memory: No Deficits noted  -       Safety awareness/insight to disability: intact   -       Mood/Affect/Coping skills/emotional control: Appropriate to situation    Physical Exam:  Postural examination/scapula alignment: -       No postural abnormalities identified  Skin integrity: Visible skin intact  Edema:  Mild in R hip and RLE  Sensation: -       Pt reports neuropathy in both hands and feet  Motor Planning: WFL  Dominant hand: Right  Upper Extremity Range of Motion:  WFL  Upper Extremity Strength: WFL   Strength: WFL  Fine Motor Coordination: Intact  Gross motor coordination: WFL  Balance:  Sitting- Independent; Standing- SBA  Vision: Intact    AMPAC 6 Click ADL:  AMPAC Total Score: 21    Treatment & Education:  *Pt educated on posterior hip precautions, adaptive equipment, and how to perform ADLs/IADLs while adhering to precautions discussed.  Specific examples provided.  *Pt practiced using adaptive equipment to perform LB dressing; cues provided to help adherence to hip precautions.  Pt donned underwear and pants.   *Pt educated on how to perform transfers from EOB, chair, and BSC.  Pt practiced from all surfaces; cues provided to help adherence to hip precautions.  *Pt ambulated to  bathroom for completion of toileting and grooming while standing.   *OT discussed how to utilize hip kit, 3 in 1 commode, and RW in home environment with pt and ; all questions addressed within scope of practice.  *Posterior hip precautions, how to perform LB dressing, and home health reviewed with pt and spouse.  Education:    Patient left up in chair with all lines intact, call button in reach and  present    GOALS:   Multidisciplinary Problems     Occupational Therapy Goals        Problem: Occupational Therapy Goal    Goal Priority Disciplines Outcome Interventions   Occupational Therapy Goal     OT, PT/OT     Description:  Goals to be met by: 3/19/2019     Patient will increase functional independence with ADLs by performing:    LE Dressing with Stand-by Assistance.  Grooming while standing with Supervision.  Toileting from bedside commode with Supervision for hygiene and clothing management.   Toilet transfer to bedside commode with Supervision.                      History:     Past Medical History:   Diagnosis Date    Anxiety     Chronic inflammatory demyelinating neuropathy     Depression     Diarrhea     Encounter for blood transfusion     GERD (gastroesophageal reflux disease)     [place] (hard of hearing)     Hyperlipidemia     Hypertension     IBS (irritable bowel syndrome)     Neuromuscular disorder     demyelinating neuropathy    Ulcer     several years ago       Past Surgical History:   Procedure Laterality Date    BREAST SURGERY       SECTION      COSMETIC SURGERY      ECTOPIC PREGNANCY SURGERY      TONSILLECTOMY         Time Tracking:     OT Date of Treatment: 19  OT Start Time: 817  OT Stop Time: 926  OT Total Time (min): 69 min    Billable Minutes:Evaluation 20  Self Care/Home Management 20  Therapeutic Activity 29    PRAVEEN Hernandez  3/12/2019

## 2019-03-12 NOTE — PLAN OF CARE
Problem: Occupational Therapy Goal  Goal: Occupational Therapy Goal  Goals to be met by: 3/19/2019     Patient will increase functional independence with ADLs by performing:    LE Dressing with Stand-by Assistance.  Grooming while standing with Supervision.  Toileting from bedside commode with Supervision for hygiene and clothing management.   Toilet transfer to bedside commode with Supervision.      OT evaluation complete and POC established.  Home health is recommended upon d/c from acute care to further address deficits and help pt improve overall functional independence.     PRAVEEN Hernandez  3/12/2019

## 2019-03-12 NOTE — PLAN OF CARE
Problem: Adult Inpatient Plan of Care  Goal: Plan of Care Review  Outcome: Ongoing (interventions implemented as appropriate)  Pt in no distress on 2L NC. IS done, will continue to monitor.

## 2019-03-12 NOTE — NURSING
No significant events overnight. Remains free from fall, injury, and skin breakdown. Voiding via jimenez to gravity; jimenez removed POD1. VSS on RA throughout the night. Pain well controlled with PO meds. Incision/dressing CDI. TEDs/SCDs in place. Plan of care reviewed with patient and all questions answered. Bed low, locked. Call light within reach. Purposeful rounding performed. Resting comfortably in bed, no other complaints at this time.

## 2019-03-12 NOTE — PT/OT/SLP PROGRESS
Physical Therapy Treatment    Patient Name:  Cecilia Munguia   MRN:  [id number]    Recommendations:     Discharge Recommendations:  other (see comments)(home with HH)   Discharge Equipment Recommendations: commode, walker, rolling, shower chair(hip kit)   Barriers to discharge: None    Assessment:     Cecilia Munguia is a 73 y.o. female admitted with a medical diagnosis of Osteoarthritis of right hip.  She presents with the following impairments/functional limitations:  weakness, impaired endurance, impaired functional mobilty, gait instability, decreased safety awareness, decreased lower extremity function, decreased ROM, orthopedic precautions, impaired sensation, impaired fine motor, decreased upper extremity function . Progressing toward goals- may need 2 PT session tomorrow b4 dc    Rehab Prognosis: Good; patient would benefit from acute skilled PT services to address these deficits and reach maximum level of function.    Recent Surgery: Procedure(s) (LRB):  ARTHROPLASTY, HIP (Right) Day of Surgery    Plan:     During this hospitalization, patient to be seen BID to address the identified rehab impairments via gait training, therapeutic activities, therapeutic exercises and progress toward the following goals:    · Plan of Care Expires:  04/10/19    Subjective     Chief Complaint: still sleepy  Patient/Family Comments/goals: I took a nap but Im still not myself yet.  I think she gave me too much   Pain/Comfort:  · Pain Rating 1: 0/10  · Location - Side 1: Right  · Location - Orientation 1: generalized  · Location 1: hip  · Pain Addressed 1: Pre-medicate for activity, Nurse notified  · Pain Rating Post-Intervention 1: 0/10      Objective:     Communicated with nurse prior to session.  Patient found up in chair and awake with jimenez catheter, peripheral IV, , oxygen  upon PT entry to room.     General Precautions: Standard, fall   Orthopedic Precautions:RLE weight bearing as tolerated, RLE posterior precautions    Braces: N/A     Functional Mobility:  · Bed Mobility:     · Sit to Supine: minimum assistance  · Transfers:     · Sit to Stand:  minimum assistance with rolling walker and and cues for hip precautions  · Gait: pt amb 50' with RW and min a for walker management and mod cueing for gt sequence .  decreased stefania, step and stride length\  · Balance: poor standing      AM-PAC 6 CLICK MOBILITY  Turning over in bed (including adjusting bedclothes, sheets and blankets)?: 2  Sitting down on and standing up from a chair with arms (e.g., wheelchair, bedside commode, etc.): 3  Moving from lying on back to sitting on the side of the bed?: 2  Moving to and from a bed to a chair (including a wheelchair)?: 2  Need to walk in hospital room?: 1  Climbing 3-5 steps with a railing?: 1  Basic Mobility Total Score: 11       Therapeutic Activities and Exercises:   gait as above.  Repositioned in bed.  Reviewed hip precautions-pt unable to name any     Patient left HOB elevated with all lines intact, call button in reach, bed alarm on, nurse notified and spouse present..    GOALS:   Multidisciplinary Problems     Physical Therapy Goals        Problem: Physical Therapy Goal    Goal Priority Disciplines Outcome Goal Variances Interventions   Physical Therapy Goal     PT, PT/OT Ongoing (interventions implemented as appropriate)     Description:  Goals to be met by: 3/25/19     Patient will increase functional independence with mobility by performin. Supine to sit with CGA  2. Sit to supine with CGA  3. Sit<>stand transfer with SBA using rolling walker.   4. Gait > 150 feet with SBA using rolling walker.   5. Ascend/descend 4 stairs with L handrails with min A  6.  Pt able to name 3/3 precautions                      Time Tracking:     PT Received On: 19  PT Start Time:      PT Stop Time: 190  PT Total Time (min): 31 min     Billable Minutes: Gait Training 16 and Therapeutic Activity 15    Treatment Type:  Treatment  PT/PTA: PT     PTA Visit Number: 0     Mile Shi, PT  03/11/2019

## 2019-03-12 NOTE — PROGRESS NOTES
"IM  Progress Note    Admit Date: 3/11/2019   LOS: 1 day     SUBJECTIVE:     Follow-up For:  Osteoarthritis of right hip    Interval History:  Uneventful night.  Discussed with ortho team.  No CP/SOB/Calf pain.          Review of Systems:  Constitutional: No fever or chills  Respiratory: No cough or shortness of breath  Cardiovascular: No chest pain or palpitations  Gastrointestinal: No nausea or vomiting  Neurological: No confusion or weakness    OBJECTIVE:     Vital Signs Range (Last 24H):  BP (!) 108/57 (BP Location: Right arm, Patient Position: Lying)   Pulse (!) 54   Temp 98 °F (36.7 °C) (Oral)   Resp 18   Ht 5' 6" (1.676 m)   Wt 59 kg (130 lb)   SpO2 96%   Breastfeeding? No   BMI 20.98 kg/m²     Temp:  [96.3 °F (35.7 °C)-98 °F (36.7 °C)]   Pulse:  [54-66]   Resp:  [16-18]   BP: (100-131)/(54-77)   SpO2:  [96 %-100 %]     I & O (Last 24H):    Intake/Output Summary (Last 24 hours) at 3/12/2019 0822  Last data filed at 3/12/2019 0646  Gross per 24 hour   Intake 2943.33 ml   Output 1550 ml   Net 1393.33 ml       Physical Exam:  General appearance: Well developed, well nourished  Eyes:  Conjunctivae/corneas clear. PERRL.  Lungs: Normal respiratory effort,   clear to auscultation bilaterally   Heart: Regular rate and rhythm, S1, S2 normal, no murmur, rub or suman.  Abdomen: Soft, non-tender non-distended; bowel sounds normal; no masses,  no organomegaly  Extremities: No cyanosis or clubbing. No edema.    Skin: Skin color, texture, turgor normal. No rashes or lesions  Neurologic: Normal strength and tone. No focal numbness or weakness   Right hip CDI    Laboratory Data:  Recent Labs   Lab 03/12/19  0446   WBC 8.82   RBC 3.77*   HGB 11.8*   HCT 35.5*      MCV 94   MCH 31.3*   MCHC 33.2       BMP: No results for input(s): GLU, NA, K, CL, CO2, BUN, CREATININE, CALCIUM, MG in the last 168 hours.    Invalid input(s):  PHOS  Lab Results   Component Value Date    CALCIUM 9.5 01/16/2019       Lab Results "   Component Value Date    CALCIUM 9.5 01/16/2019       No results found for: URICACID    BNP  No results for input(s): BNP, BNPTRIAGEBLO in the last 168 hours.    Medications:  Medication list was reviewed and changes noted under Assessment/Plan.    Diagnostic Results:        ASSESSMENT/PLAN:     1. HTN-continue home meds with hold parameters.  Hold HCTZ this am with mild hypotension.   2. GERD-Continue PPI  3. 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 Allergic Rhinnitis-Continue Flonase prn  5. OA s/p RTHA-per ortho  6. DVT Prophylaxis- q12 per ortho     Ok to DC from medicine.

## 2019-03-12 NOTE — PLAN OF CARE
Patient voiced the need for a long hip kit & agreed to cover out of pocket expense - [facility] to deliver to bedside

## 2019-03-12 NOTE — PLAN OF CARE
Problem: Physical Therapy Goal  Goal: Physical Therapy Goal  Goals to be met by: 3/25/19     Patient will increase functional independence with mobility by performin. Supine to sit with CGA  2. Sit to supine with CGA  3. Sit<>stand transfer with SBA using rolling walker.   4. Gait > 150 feet with SBA using rolling walker.   5. Ascend/descend 4 stairs with L handrails with min A  6.  Pt able to name 3/3 precautions     Pt progressing well towards goals, sup to sit to supine CGA/SBA at RLE, sitt to stand CGA/SBA with RW and cueing for keeping hip prec., amb'd 200' with RW and CGA/SBA, WBAT on RLE. Asc/desc 4 steps with L rail and SBA, pt able to recall 3/3 hip prec. HHPT/OT

## 2019-03-14 ENCOUNTER — TELEPHONE (OUTPATIENT)
Dept: MEDSURG UNIT | Facility: OTHER | Age: 74
End: 2019-03-14

## 2019-03-18 NOTE — DISCHARGE SUMMARY
[facility]  Short Stay  Discharge Summary  TOTAL HIP REPLACEMENT    Admit Date: 3/11/2019    Discharge Date and Time: 3/12/2019 12:30 PM      Discharge Attending Physician: Deonte Lynn MD     Hospital Course:  Cecilia Munguia,is a 73 y.o. female with severe osteoarthritis,   R hip, unrelieved with the conservative measures. The patient was admitted on 3/11/2019 underwent R total hip replacement.  Postoperatively, the patient did well and was weightbearing as tolerated with a walker. Cecilia Munguia was instructed on hip precautions.  The patient was discharged on 3/12/2019 with home health physical therapy and nursing. The patient will be on Percocet for pain and aspirin 325 mg p.o. b.i.d. with meals x4 weeks. I will see them back in the office in 4 weeks for followup.      Final Diagnoses:    Principal Problem: Osteoarthritis of right hip   Secondary Diagnoses:   Active Hospital Problems    Diagnosis  POA    Osteoarthritis of right hip [M16.11]  Yes      Resolved Hospital Problems    Diagnosis Date Resolved POA    *Osteoarthritis of right hip [M16.11] 03/11/2019 Yes       Discharged Condition: good    [place] [facility]    Follow up/Patient Instructions:    Medications:  Reconciled Home Medications:      Medication List      START taking these medications    aspirin 325 MG tablet  Take 1 tablet (325 mg total) by mouth every 12 (twelve) hours. For 4 weeks     oxyCODONE-acetaminophen 5-325 mg per tablet  Commonly known as:  PERCOCET  take 1 tablet by mouth every 4 hours as needed for pain or 2 tablets every 6 hours as needed for pain        CHANGE how you take these medications    fluticasone 50 mcg/actuation nasal spray  Commonly known as:  FLONASE  USE 2 SPRAYS IN EACH NOSTRIL DAILY.  What changed:    · how much to take  · how to take this  · when to take this        CONTINUE taking these medications    CULTURELLE PROBIOTICS ORAL  Take 1 tablet by mouth once daily.    "  hydroCHLOROthiazide 25 MG tablet  Commonly known as:  HYDRODIURIL  TAKE 1 TABLET (25 MG TOTAL) BY MOUTH ONCE DAILY.     IBGARD ORAL  Take by mouth.     PriLOSEC 20 MG capsule  Generic drug:  omeprazole  Take 20 mg by mouth Daily.     simethicone 80 MG chewable tablet  Commonly known as:  MYLICON  Take 80 mg by mouth every 6 (six) hours as needed for Flatulence.          Discharge Procedure Orders   COMMODE FOR HOME USE     Order Specific Question Answer Comments   Type: Standard    Height: 5' 6" (1.676 m)    Weight: 59 kg (130 lb)    Length of need (1-99 months): 99      WALKER FOR HOME USE     Order Specific Question Answer Comments   Type of Walker: Adult (5'4"-6'6")    With wheels? Yes    Height: 5' 6" (1.676 m)    Weight: 59 kg (130 lb)    Length of need (1-99 months): 99    Please check all that apply: Patient's condition impairs ambulation.    Please check all that apply: Patient is unable to safely ambulate without equipment.    Please check all that apply: Patient needs help to get in and out of chair.    Please check all that apply: Walker will be used for gait training.      HIP KIT FOR HOME USE     Order Specific Question Answer Comments   Height: 5' 6" (1.676 m)    Weight: 59 kg (130 lb)    Does patient have medical equipment at home? none    Length of need (1-99 months): 99    Type: [place] Hip Kit    Vendor: [facility] ok to pull   Expected Date of Delivery: 3/12/2019      Follow-up Information     [facility].    Specialties:  DME Provider, Home Health Services  Why:  Home Health   Contact information:  [address] [place] [address]  [phone]             [facility].    Specialty:  DME Provider  Why:  rolling walker, bedside commode, & long hip kit   Contact information:  [address]  [address]  [place] [address]  [phone]             Deonte Lynn MD In 4 weeks.    Specialty:  Orthopedic Surgery  Contact information:  [address] [facility]  [facility] [address]  [phone]                     [facility]  Short Stay  Discharge Summary  TOTAL HIP REPLACEMENT    Admit Date: 3/11/2019    Discharge Date and Time: 3/12/2019 12:30 PM      Discharge Attending Physician: Deonte Lynn MD     Hospital Course:  Cecilia Munguia,is a 73 y.o. female with severe osteoarthritis,   R hip, unrelieved with the conservative measures. The patient was admitted on 3/11/2019 underwent R total hip replacement.  Postoperatively, the patient did well and was weightbearing as tolerated with a walker. Cecilia Munguia was instructed on hip precautions.  The patient was discharged on 3/12/2019 with home health physical therapy and nursing. The patient will be on Percocet for pain and aspirin 325 mg p.o. b.i.d. with meals x4 weeks. I will see them back in the office in 4 weeks for followup.      Final Diagnoses:    Principal Problem: Osteoarthritis of right hip   Secondary Diagnoses:   Active Hospital Problems    Diagnosis  POA    Osteoarthritis of right hip [M16.11]  Yes      Resolved Hospital Problems    Diagnosis Date Resolved POA    *Osteoarthritis of right hip [M16.11] 03/11/2019 Yes       Discharged Condition: good    Disposition: Home-Health Care [facility]    Follow up/Patient Instructions:    Medications:  Reconciled Home Medications:      Medication List      START taking these medications    aspirin 325 MG tablet  Take 1 tablet (325 mg total) by mouth every 12 (twelve) hours. For 4 weeks     oxyCODONE-acetaminophen 5-325 mg per tablet  Commonly known as:  PERCOCET  take 1 tablet by mouth every 4 hours as needed for pain or 2 tablets every 6 hours as needed for pain        CHANGE how you take these medications    fluticasone 50 mcg/actuation nasal spray  Commonly known as:  FLONASE  USE 2 SPRAYS IN EACH NOSTRIL DAILY.  What changed:    · how much to take  · how to take this  · when to take this        CONTINUE taking these medications    CULTURELLE  "PROBIOTICS ORAL  Take 1 tablet by mouth once daily.     hydroCHLOROthiazide 25 MG tablet  Commonly known as:  HYDRODIURIL  TAKE 1 TABLET (25 MG TOTAL) BY MOUTH ONCE DAILY.     IBGARD ORAL  Take by mouth.     PriLOSEC 20 MG capsule  Generic drug:  omeprazole  Take 20 mg by mouth Daily.     simethicone 80 MG chewable tablet  Commonly known as:  MYLICON  Take 80 mg by mouth every 6 (six) hours as needed for Flatulence.          Discharge Procedure Orders   COMMODE FOR HOME USE     Order Specific Question Answer Comments   Type: Standard    Height: 5' 6" (1.676 m)    Weight: 59 kg (130 lb)    Length of need (1-99 months): 99      WALKER FOR HOME USE     Order Specific Question Answer Comments   Type of Walker: Adult (5'4"-6'6")    With wheels? Yes    Height: 5' 6" (1.676 m)    Weight: 59 kg (130 lb)    Length of need (1-99 months): 99    Please check all that apply: Patient's condition impairs ambulation.    Please check all that apply: Patient is unable to safely ambulate without equipment.    Please check all that apply: Patient needs help to get in and out of chair.    Please check all that apply: Walker will be used for gait training.      HIP KIT FOR HOME USE     Order Specific Question Answer Comments   Height: 5' 6" (1.676 m)    Weight: 59 kg (130 lb)    Does patient have medical equipment at home? none    Length of need (1-99 months): 99    Type: [place] Hip Kit    Vendor: [facility] ok to pull   Expected Date of Delivery: 3/12/2019      Follow-up Information     [facility].    Specialties:  DME Provider, Home Health Services  Why:  Home Health   Contact information:  [address] [place] [address]  [phone]             [facility].    Specialty:  DME Provider  Why:  rolling walker, bedside commode, & long hip kit   Contact information:  [address]  [address]  [place] [address]  [phone]             Deonte Lynn MD In 4 weeks.    Specialty:  Orthopedic Surgery  Contact " information:  [address]  [facility]  [facility] [address]  [phone]

## 2019-04-05 DIAGNOSIS — I10 ESSENTIAL HYPERTENSION: ICD-10-CM

## 2019-04-05 RX ORDER — HYDROCHLOROTHIAZIDE 25 MG/1
25 TABLET ORAL DAILY
Qty: 90 TABLET | Refills: 3 | Status: SHIPPED | OUTPATIENT
Start: 2019-04-05 | End: 2019-04-24 | Stop reason: SDUPTHER

## 2019-04-16 ENCOUNTER — TELEPHONE (OUTPATIENT)
Dept: UROGYNECOLOGY | Facility: CLINIC | Age: 74
End: 2019-04-16

## 2019-04-16 NOTE — TELEPHONE ENCOUNTER
----- Message from Rere Méndez sent at 4/16/2019  8:02 AM CDT -----  Contact: Pt   Name of Who is Calling: EDUAR SEAY [[id number]]    What is the request in detail: Pt called to reschedule her appt for tomorrow. Please call to further discuss and advise.     Can the clinic reply by [facility]: No     What Number to Call Back if not in [facility]: [phone]

## 2019-04-16 NOTE — TELEPHONE ENCOUNTER
Good morning Jessica,    Can you please contact pt and help her reschedule her FU appointment with Dr. Foley.    Thank you,  Omi

## 2019-04-16 NOTE — TELEPHONE ENCOUNTER
Mayur Braga,  Appointment scheduled with Dr. Foley for 6/12/19 at 10:30am.        Thank you,  Jessica

## 2019-04-24 DIAGNOSIS — I10 ESSENTIAL HYPERTENSION: ICD-10-CM

## 2019-04-24 RX ORDER — HYDROCHLOROTHIAZIDE 25 MG/1
25 TABLET ORAL DAILY
Qty: 90 TABLET | Refills: 3 | Status: SHIPPED | OUTPATIENT
Start: 2019-04-24 | End: 2019-08-26

## 2019-04-24 NOTE — TELEPHONE ENCOUNTER
----- Message from Timmy Davidson sent at 4/24/2019 10:12 AM CDT -----  Contact: EDUAR SEAY [[id number]]  Can the clinic reply in [facility]:Yes      Please refill the medication(s) listed below. Please call the patient when the prescription(s) is ready for  at the phone number. [phone]. Pt states that she is completely out and pharmacy need a new Prior Auth. Please advise. Thanks       Medication #1:hydroCHLOROthiazide (HYDRODIURIL) 25 MG tablet      Medication #2:       Preferred Pharmacy:[facility]/PHARMACY #[id number] - [place] - [address]

## 2019-05-14 ENCOUNTER — TELEPHONE (OUTPATIENT)
Dept: UROLOGY | Facility: CLINIC | Age: 74
End: 2019-05-14

## 2019-05-14 NOTE — TELEPHONE ENCOUNTER
----- Message from Crystal Bynum sent at 5/14/2019  8:08 AM CDT -----  Contact: pt  Name of Who is Calling: EDUAR SEAY [[id number]]    What is the request in detail:pt needs a MAMMO ordered and sent to [facility].. Please advise  Fax:[phone]      Can the clinic reply by [facility]: no      What Number [facility] Back [facility]: [phone]

## 2019-06-06 ENCOUNTER — CLINICAL SUPPORT (OUTPATIENT)
Dept: AUDIOLOGY | Facility: CLINIC | Age: 74
End: 2019-06-06
Payer: COMMERCIAL

## 2019-06-06 DIAGNOSIS — H81.8X9 OTHER DISORDERS OF VESTIBULAR FUNCTION, UNSPECIFIED EAR: Primary | ICD-10-CM

## 2019-06-06 PROCEDURE — 92537 CALORIC VSTBLR TEST W/REC: CPT | Mod: S$GLB,,, | Performed by: AUDIOLOGIST

## 2019-06-06 PROCEDURE — 92537 PR CALORIC VSTBLR TEST W/REC BITHERMAL: ICD-10-PCS | Mod: S$GLB,,, | Performed by: AUDIOLOGIST

## 2019-06-06 PROCEDURE — 92540 BASIC VESTIBULAR EVALUATION: CPT | Mod: S$GLB,,, | Performed by: AUDIOLOGIST

## 2019-06-06 PROCEDURE — 92540 PR VESTIBULAR EVAL NYSTAG FOVL&PERPH STIM OSCIL TRACKING: ICD-10-PCS | Mod: S$GLB,,, | Performed by: AUDIOLOGIST

## 2019-06-06 NOTE — PROGRESS NOTES
VNG Evaluation    Referring physician:  Dr. Munson    73 y.o. female complains of dizziness, imbalance, nausea and aural fullness.  Symptoms are provoked by quick head turns, rolling over to the right in bed, bending over and looking up and have been recurring over the past 20 years but have gotten worse over the last two months.  Ms. Munguia reported that each episode lasts for seconds at a time.  She also stated that she has had positional vertigo and an Epley maneuver in the past but these episodes are different than previous episodes.  Ms. Munguia denied taking any medication that would affect today's testing.    Gaze nystagmus was absent.    Oculomotor function was normal.    Spontaneous nystagmus was absent.    The head-hanging left Hallpike was negative.    The head-hanging right Hallpike was negative.    Static positional nystagmus was absent.    Unilateral weakness: 5% (left)  Directional preponderance 5% (right-beating)    RW: 18 d/s  LW: 15 d/s  RC: 13 d/s   d/s    Fixation suppression was normal.    Impression: Normal VNG; no evidence of vestibular system dysfunction including BPPV    Recommend: 1. A trial with Cawthorne exercises to help reduce subjective symptoms.  A copy of these exercises was provided at today's appointment.  2. A formal Risk of Falls assessment and formal vestibular/balance rehab.

## 2019-06-06 NOTE — Clinical Note
Dr. Munson,Please see the results of today's VNG evaluation.  Please let me know if I can provide any additional information.Kind regards,Emiliana Real, [facility]

## 2019-06-07 ENCOUNTER — TELEPHONE (OUTPATIENT)
Dept: INTERNAL MEDICINE | Facility: CLINIC | Age: 74
End: 2019-06-07

## 2019-06-12 ENCOUNTER — OFFICE VISIT (OUTPATIENT)
Dept: UROGYNECOLOGY | Facility: CLINIC | Age: 74
End: 2019-06-12
Payer: COMMERCIAL

## 2019-06-12 VITALS
HEIGHT: 66 IN | BODY MASS INDEX: 22.18 KG/M2 | SYSTOLIC BLOOD PRESSURE: 128 MMHG | DIASTOLIC BLOOD PRESSURE: 82 MMHG | WEIGHT: 138 LBS

## 2019-06-12 DIAGNOSIS — R19.8 IRREGULAR BOWEL HABITS: ICD-10-CM

## 2019-06-12 DIAGNOSIS — N95.2 VAGINAL ATROPHY: ICD-10-CM

## 2019-06-12 DIAGNOSIS — R10.11 CHRONIC RUQ PAIN: ICD-10-CM

## 2019-06-12 DIAGNOSIS — G61.81 CHRONIC INFLAMMATORY DEMYELINATING POLYNEUROPATHY: Chronic | ICD-10-CM

## 2019-06-12 DIAGNOSIS — N39.46 URINARY INCONTINENCE, MIXED: ICD-10-CM

## 2019-06-12 DIAGNOSIS — N39.46 MIXED URINARY INCONTINENCE DUE TO FEMALE GENITAL PROLAPSE: Primary | ICD-10-CM

## 2019-06-12 DIAGNOSIS — G89.29 CHRONIC RUQ PAIN: ICD-10-CM

## 2019-06-12 DIAGNOSIS — N81.9 MIXED URINARY INCONTINENCE DUE TO FEMALE GENITAL PROLAPSE: Primary | ICD-10-CM

## 2019-06-12 DIAGNOSIS — F43.9 SITUATIONAL STRESS: ICD-10-CM

## 2019-06-12 DIAGNOSIS — R10.11 RUQ PAIN: ICD-10-CM

## 2019-06-12 DIAGNOSIS — M62.89 PELVIC FLOOR DYSFUNCTION: ICD-10-CM

## 2019-06-12 DIAGNOSIS — J30.9 ALLERGIC RHINITIS, UNSPECIFIED SEASONALITY, UNSPECIFIED TRIGGER: Primary | ICD-10-CM

## 2019-06-12 DIAGNOSIS — F41.1 GENERALIZED ANXIETY DISORDER: ICD-10-CM

## 2019-06-12 DIAGNOSIS — G60.9 PERIPHERAL NEUROPATHY, IDIOPATHIC: ICD-10-CM

## 2019-06-12 PROCEDURE — 99213 PR OFFICE/OUTPT VISIT, EST, LEVL III, 20-29 MIN: ICD-10-PCS | Mod: S$GLB,,, | Performed by: OBSTETRICS & GYNECOLOGY

## 2019-06-12 PROCEDURE — 99213 OFFICE O/P EST LOW 20 MIN: CPT | Mod: S$GLB,,, | Performed by: OBSTETRICS & GYNECOLOGY

## 2019-06-12 PROCEDURE — 99999 PR PBB SHADOW E&M-EST. PATIENT-LVL III: ICD-10-PCS | Mod: PBBFAC,,, | Performed by: OBSTETRICS & GYNECOLOGY

## 2019-06-12 PROCEDURE — 99999 PR PBB SHADOW E&M-EST. PATIENT-LVL III: CPT | Mod: PBBFAC,,, | Performed by: OBSTETRICS & GYNECOLOGY

## 2019-06-12 RX ORDER — METHYLPREDNISOLONE 4 MG/1
TABLET ORAL
Refills: 0 | COMMUNITY
Start: 2019-05-06 | End: 2019-06-12

## 2019-06-12 RX ORDER — FLUTICASONE PROPIONATE 50 MCG
SPRAY, SUSPENSION (ML) NASAL
Qty: 16 ML | Refills: 2 | Status: SHIPPED | OUTPATIENT
Start: 2019-06-12 | End: 2019-06-12 | Stop reason: SDUPTHER

## 2019-06-12 RX ORDER — FLUTICASONE PROPIONATE 50 MCG
SPRAY, SUSPENSION (ML) NASAL
Qty: 16 ML | Refills: 2 | Status: SHIPPED | OUTPATIENT
Start: 2019-06-12 | End: 2021-04-29 | Stop reason: CLARIF

## 2019-06-12 NOTE — TELEPHONE ENCOUNTER
----- Message from Diane Mansfield sent at 6/12/2019  8:36 AM CDT -----  Contact: pt  Can the clinic reply in [facility]: yes      Please refill the medication(s) listed below. The patient can be reached at this phone number  once it is called into the pharmacy. [phone]      Medication #1fluticasone (FLONASE) 50 mcg/actuation nasal spray      Medication #2      Preferred Pharmacy:[facility]/pharmacy #[id number] - [place] - [address] [phone] (Phone)  [phone] (Fax)

## 2019-06-12 NOTE — PATIENT INSTRUCTIONS
1) Mixed urinary incontinence, urge > stress:    --consider contribution of neuromuscular do--appears stable currently  --Empty bladder every 3 hours.  Empty well: wait a minute, lean forward on toilet.    --Avoid dietary irritants (see sheet).  Keep diary x 3-5 days to determine your irritants.  --start pelvic floor PT if possible.  Call in a few days to make an appt: Shaka Wesley). (p) [phone] (f) [phone].   --URGE: see if ok to start oxybutynin 10 mg xl daily; mirabegron 50 mg also sent to [facility]--see which one is better and start that one;  Takes 2-4 weeks to see if will have effect.  For dry mouth: get sour, sugar free lozenge or gum.    --STRESS:  Pessary vs. Sling.     2)  Irregular bowel habits:  --hydrate well  --keep diary, notice triggers, and cut back if noted  --continue daily probiotic  --take IBDgard as needed for bad times  --work on stress/anxiety control:  Consider speaking with therapist, cognitive behavioral therapy.   --Controlling may help bladder urgency/leakage and fiber may better control cholesterol and blood glucose.  Start daily fiber.  Take 1 tsp of fiber powder (psyllium or other sugar-free powder).  Mix in 8 oz of water.  Take x 3-5 days.  Then, increase fiber by 1 tsp every 3-5 days until stool is easy to pass, bowel movements are not too hard and not too loose.  Stop and continue at that dose.   Do not exceed 6 tsps/day.    --for hard stool: May also use over the counter stool softener 1-2 x/day.  AVOID laxatives.    Therapists:   [facility]  Ashley Choi  [address] [place]    [address] [phone]  [email or web address]  [email or web address]    Nora Fox:  [phone]    Omi Greene [facility], LMFT  (p) [phone]  [address] [place]    [address]    Delicia PILLAIW  (p) [phone] [address]   [address]  [place]     [address]    Adelita Miranda and her cell phone number is  [phone].     [facility] [phone].      [facility]:  Violette Johnson -    [phone]     [facility]:  Dr. [facility] -    [phone]     3)  Osteopenia (weakness in bones):  --Take 600 mg calcium every AM and 600 mg calcium every PM (for total of 1200 mg daily).  Take 400 IU vitamin D in AM and 400 IU vitamin D in the PM (for total of 800 IU daily).  Try to take a chew or gummy.   --at least take vitamin D  --Start weight bearing exercises in improve bone density:  This type of exercise forces you to work against gravity. Some examples of weight-bearing exercises include weight training, walking, hiking, jogging, climbing stairs, tennis, and dancing. Examples of exercises that are not weight-bearing include swimming and bicycling. Although these activities help build and maintain strong muscles and have excellent cardiovascular benefits, they are not the best way to exercise your bones.  --Follow up for repeat DEXA (bone density testing) as scheduled on 2020-22.      4)  Well-woman:  Pap test: done today.  If normal, no further needed.  Still need annual GYN exam.   Mammogram: Date of last: 2017([facility]).  Result: Normal.  Given Rx for MMG at [facility]--please call and schedule.   Colonoscopy: Date of last: ~2012 ([facility]).  Result: normal per report.  Repeat due:  This year.  Please follow up with them.    DEXA:  Date of last: 3/18.  Result:  osteopenia. There is a 13.4% risk of a major osteoporotic fracture and a 1.4% risk of hip fracture in the next 10 years (FRAX).  Repeat due:  2020-22.     5)  RUQ pain, chronic:  --reports RUQ US was normal in past  --no major findings on exam today  --discuss need for further testing with GI MD next week    6)  Vaginal atrophy (dryness):  Use 0.5 gram of estrogen cream in vagina twice a week.  --treating  dryness may help bladder urgency/frequency and reduce UTI frequency    7)  RTC 3 months.

## 2019-06-12 NOTE — PROGRESS NOTES
Urogyn follow up  06/16/2019    [facility] UROGYN [facility] 4   [address] [id number]    Cecilia Munguia  [id number]  1945      Cecilia Munguia is a 73 y.o.  here for a urogyn follow up.    1)  UI:  (+) KAVIN (sneezing, moving) < (+) UUI  X 1 year.  (+) pads: 1-2/d, damp mostly. Takes diuretic at 10 AM, then wears pad right after because will have some issues making it to BR with UUI.   Daytime frequency: Q 2-3 hours.  Nocturia: Yes: 2/night.  UUI is not as bad at night. Does drink 2 glasses of wine at night with dinner.   (--) dysuria,  (--) hematuria,  (--) frequent UTIs.  (+) complete bladder emptying.    2)  POP:  Absent.(--) vaginal bleeding. (--) vaginal discharge. (+) sexually active.  (+) dyspareunia. Mild due to dryness.  (+)  Vaginal dryness. Uses OTC lube. (--) vaginal estrogen use.     3)  BM:  (+) constipation/straining intermittent with loose stool. Followed by GI. Seems triggered by diet.  Better with probiotics and IBDgard.  Has been very stressed, as well.  Has some chronic.   (--) chronic diarrhea. (--) hematochezia.  (--) fecal incontinence.  (--) fecal smearing/urgency.  (+) complete evacuation.     Past Medical History  Past Medical History:   Diagnosis Date    Anxiety     Chronic inflammatory demyelinating neuropathy     Depression     Diarrhea     Encounter for blood transfusion     GERD (gastroesophageal reflux disease)     [place] (hard of hearing)     Hyperlipidemia     Hypertension     IBS (irritable bowel syndrome)     Neuromuscular disorder     demyelinating neuropathy    Ulcer     several years ago   Demyelinating disorder: Has slowly progressed.  Newer balance issues. No saddle numbness.  Sees neuro for IVIG.  Flares: has numbness, distal to proximal in LE/UE.  Has some motor issues.      Past Surgical History  Past Surgical History:   Procedure Laterality Date    ARTHROPLASTY, HIP Right 3/11/2019    Performed by Deonte Lynn [facility] at [facility]     BREAST SURGERY       SECTION      COSMETIC SURGERY      ECTOPIC PREGNANCY SURGERY      TONSILLECTOMY     ectopic x 1: Pffanenstiel     Hysterectomy: No    Past Ob History    Ectopic x 1.    x 0.  C/s x 1.    Largest infant weight: 7#3oz.     Gynecologic History  LMP: No LMP recorded. Patient is postmenopausal.  Age of menarche: 16 yo  Age of menopause: early 50s  Menstrual history: h/o normal  Pap test: done today.  If normal, no further needed.  Still need annual GYN exam.   Mammogram: Date of last: 19([facility]).  Result: Normal.  Given Rx for MMG at [facility]--please call and schedule.   Colonoscopy: Date of last: ~ ([facility]).  Result: normal per report.  Repeat due:  This year.  Please follow up with them.    DEXA:  Date of last: 3/18.  Result:  osteopenia. There is a 13.4% risk of a major osteoporotic fracture and a 1.4% risk of hip fracture in the next 10 years (FRAX).  Repeat due:  .     Issues include:  Patient Active Problem List   Diagnosis    Essential hypertension    GERD (gastroesophageal reflux disease)    Peripheral neuropathy, idiopathic    Mixed hyperlipidemia    Chronic inflammatory demyelinating polyneuropathy    Sensorineural hearing loss    Generalized anxiety disorder    Urinary incontinence, mixed    Cervical cancer screening    Vaginal atrophy    Irregular bowel habits    Chronic RUQ pain    Osteopenia    Situational stress    Pelvic floor dysfunction    Osteoarthritis of right hip       History since last visit:    1) Mixed urinary incontinence, urge > stress:    --no major improvement yet  --consider contribution of neuromuscular do--appears stable currently  --did not start PT due to hip surgery  --did not start oxybutynin 10 mg xl --read SE and was worried about neuro issues    2)  Irregular bowel habits:  --saw GI MD--was having flare after hip surgery--thinks having some anxiety issues   --has not made appt with therapist, as it  didn't work in the past  --continues daily probiotic  --taking IBDgard as needed for bad times  --work on stress/anxiety control:  Consider speaking with therapist, cognitive behavioral therapy.   --hesitant to take too much fiber    3)  Osteopenia (weakness in bones):  --Take 600 mg calcium every AM and 600 mg calcium every PM (for total of 1200 mg daily).  Take 400 IU vitamin D in AM and 400 IU vitamin D in the PM (for total of 800 IU daily).  Try to take a chew or gummy.   --at least take vitamin D  --Start weight bearing exercises in improve bone density:  This type of exercise forces you to work against gravity. Some examples of weight-bearing exercises include weight training, walking, hiking, jogging, climbing stairs, tennis, and dancing. Examples of exercises that are not weight-bearing include swimming and bicycling. Although these activities help build and maintain strong muscles and have excellent cardiovascular benefits, they are not the best way to exercise your bones.  --Follow up for repeat DEXA (bone density testing) as scheduled on 2020-22.      4)  Well-woman:  Pap test: done today.  If normal, no further needed.  Still need annual GYN exam.   Mammogram: Date of last: 6/4/19([facility]).  Result: Normal.  Given Rx for MMG at [facility]--please call and schedule.   Colonoscopy: Date of last: ~2012 ([facility]).  Result: normal per report.  Repeat due:  This year.  Please follow up with them.    DEXA:  Date of last: 3/18.  Result:  osteopenia. There is a 13.4% risk of a major osteoporotic fracture and a 1.4% risk of hip fracture in the next 10 years (FRAX).  Repeat due:  2020-22.     5)  RUQ pain, chronic:  --reports RUQ US was normal in past  --no major findings on exam today  --discuss need for further testing with GI MD next week     6)  Vaginal atrophy (dryness):  Using estrogen cream 2x/week.       Medications:    Current Outpatient Medications:     hydroCHLOROthiazide (HYDRODIURIL) 25 MG  "tablet, Take 1 tablet (25 mg total) by mouth once daily., Disp: 90 tablet, Rfl: 3    L. rhamnosus GG/inulin (CULTURELLE PROBIOTICS ORAL), Take 1 tablet by mouth once daily., Disp: , Rfl:     omeprazole (PRILOSEC) 20 MG capsule, Take 20 mg by mouth Daily. , Disp: , Rfl:     simethicone (MYLICON) 80 MG chewable tablet, Take 80 mg by mouth every 6 (six) hours as needed for Flatulence., Disp: , Rfl:     fluticasone propionate (FLONASE) 50 mcg/actuation nasal spray, SPRAY 2 SPRAYS INTO EACH NOSTRIL EVERY DAY, Disp: 16 mL, Rfl: 2    mirabegron (MYRBETRIQ) 50 mg Tb24, Take 1 tablet (50 mg total) by mouth once daily., Disp: 30 tablet, Rfl: 11    ROS:  As per HPI.      Exam  /82 (BP Location: Left arm, Patient Position: Sitting, BP Method: Large (Manual))   Ht 5' 6" (1.676 m)   Wt 62.6 kg (138 lb 0.1 oz)   BMI 22.28 kg/m²   General: alert and oriented, no acute distress  Remainder of PE deferred.     Impression  1. Mixed urinary incontinence due to female genital prolapse  Ambulatory consult to Physical Therapy   2. Urinary incontinence, mixed  mirabegron (MYRBETRIQ) 50 mg Tb24   3. RUQ pain  Ambulatory consult to Physical Therapy   4. Chronic inflammatory demyelinating polyneuropathy     5. Generalized anxiety disorder     6. Situational stress     7. Peripheral neuropathy, idiopathic     8. Vaginal atrophy     9. Pelvic floor dysfunction     10. Chronic RUQ pain     11. Irregular bowel habits       We reviewed the above issues and discussed options for short-term versus long-term management of her problems.   Plan:   1) Mixed urinary incontinence, urge > stress:    --consider contribution of neuromuscular do--appears stable currently  --Empty bladder every 3 hours.  Empty well: wait a minute, lean forward on toilet.    --Avoid dietary irritants (see sheet).  Keep diary x 3-5 days to determine your irritants.  --start pelvic floor PT if possible.  Call in a few days to make an appt: Shaka Wesley). (p) " [phone] (f) [phone].   --URGE: see if ok to start oxybutynin 10 mg xl daily; mirabegron 50 mg [facility] to [facility]--see which one is better and start that one;  Takes 2-4 weeks to see if will have effect.  For dry mouth: get sour, sugar free lozenge or gum.    --STRESS:  Pessary vs. Sling.     2)  Irregular bowel habits:  --hydrate well  --keep diary, notice triggers, and cut back if noted  --continue daily probiotic  --take IBDgard as needed for bad times  --work on stress/anxiety control:  Consider speaking with therapist, cognitive behavioral therapy.   --Controlling may help bladder urgency/leakage and fiber may better control cholesterol and blood glucose.  Start daily fiber.  Take 1 tsp of fiber powder (psyllium or other sugar-free powder).  Mix in 8 oz of water.  Take x 3-5 days.  Then, increase fiber by 1 tsp every 3-5 days until stool is easy to pass, bowel movements are not too hard and not too loose.  Stop and continue at that dose.   Do not exceed 6 tsps/day.    --for hard stool: May also use over the counter stool softener 1-2 x/day.  AVOID laxatives.    Therapists:   [facility]  Ashley Choi  [address] [place]    [address] [phone]  [email or web address]    Nora Fox:  [phone]    Omi Greene [facility], LMFT  p) [phone]  [address] [place]    [address]    Delicia Latham [facility]  (p) [phone]  [address] [place], [place] [address]segundo Santiago [address] [place] [address]     [address]    Adelita Miranda and her cell phone number is [phone].     [facility] [phone].      [facility]:  Violette Johnson -    [phone]     [facility]:  Dr. Mclean Head-Yodit -    [phone]     3)  Osteopenia (weakness in bones):  --Take 600 mg calcium every AM and 600 mg calcium every PM (for  total of 1200 mg daily).  Take 400 IU vitamin D in AM and 400 IU vitamin D in the PM (for total of 800 IU daily).  Try to take a chew or gummy.   --at least take vitamin D  --Start weight bearing exercises in improve bone density:  This type of exercise forces you to work against gravity. Some examples of weight-bearing exercises include weight training, walking, hiking, jogging, climbing stairs, tennis, and dancing. Examples of exercises that are not weight-bearing include swimming and bicycling. Although these activities help build and maintain strong muscles and have excellent cardiovascular benefits, they are not the best way to exercise your bones.  --Follow up for repeat DEXA (bone density testing) as scheduled on 2020-22.      4)  Well-woman:  Pap test: done today.  If normal, no further needed.  Still need annual GYN exam.   Mammogram: Date of last: 2017([facility]).  Result: Normal.  Given Rx for MMG at [facility]--please call and schedule.   Colonoscopy: Date of last: ~2012 ([facility]).  Result: normal per report.  Repeat due:  This year.  Please follow up with them.    DEXA:  Date of last: 3/18.  Result:  osteopenia. There is a 13.4% risk of a major osteoporotic fracture and a 1.4% risk of hip fracture in the next 10 years (FRAX).  Repeat due:  2020-22.     5)  RUQ pain, chronic:  --reports RUQ US was normal in past  --no major findings on exam today  --discuss need for further testing with GI MD next week    6)  Vaginal atrophy (dryness):  Use 0.5 gram of estrogen cream in vagina twice a week.  --treating dryness may help bladder urgency/frequency and reduce UTI frequency    7)  RTC 3 months.     30 minutes were spent in face to face time with this patient  100 % of this time was spent in counseling and/or coordination of care     Karine Foley MD  [facility]  Division of Female Pelvic Medicine and Reconstructive Surgery  Department of Obstetrics & Gynecology

## 2019-07-08 ENCOUNTER — TELEPHONE (OUTPATIENT)
Dept: INTERNAL MEDICINE | Facility: CLINIC | Age: 74
End: 2019-07-08

## 2019-07-11 ENCOUNTER — OFFICE VISIT (OUTPATIENT)
Dept: INTERNAL MEDICINE | Facility: CLINIC | Age: 74
End: 2019-07-11
Payer: COMMERCIAL

## 2019-07-11 VITALS
BODY MASS INDEX: 22.39 KG/M2 | HEIGHT: 66 IN | OXYGEN SATURATION: 97 % | DIASTOLIC BLOOD PRESSURE: 98 MMHG | SYSTOLIC BLOOD PRESSURE: 120 MMHG | HEART RATE: 61 BPM | WEIGHT: 139.31 LBS

## 2019-07-11 DIAGNOSIS — F34.1 DYSTHYMIA: ICD-10-CM

## 2019-07-11 DIAGNOSIS — R42 DIZZINESS: Primary | ICD-10-CM

## 2019-07-11 PROCEDURE — 99214 PR OFFICE/OUTPT VISIT, EST, LEVL IV, 30-39 MIN: ICD-10-PCS | Mod: S$GLB,,, | Performed by: INTERNAL MEDICINE

## 2019-07-11 PROCEDURE — 99999 PR PBB SHADOW E&M-EST. PATIENT-LVL IV: CPT | Mod: PBBFAC,,, | Performed by: INTERNAL MEDICINE

## 2019-07-11 PROCEDURE — 99999 PR PBB SHADOW E&M-EST. PATIENT-LVL IV: ICD-10-PCS | Mod: PBBFAC,,, | Performed by: INTERNAL MEDICINE

## 2019-07-11 PROCEDURE — 99214 OFFICE O/P EST MOD 30 MIN: CPT | Mod: S$GLB,,, | Performed by: INTERNAL MEDICINE

## 2019-07-11 RX ORDER — MECLIZINE HCL 12.5 MG 12.5 MG/1
12.5 TABLET ORAL 2 TIMES DAILY PRN
Qty: 20 TABLET | Refills: 0 | Status: SHIPPED | OUTPATIENT
Start: 2019-07-11 | End: 2019-09-04 | Stop reason: SDUPTHER

## 2019-07-11 NOTE — PROGRESS NOTES
Subjective:       Patient ID: Cecilia Munguia is a 73 y.o. female.    Chief Complaint: Dizziness (x 2 mths)    Pt c/o dizziness for 2 months. She has had dizziness before and been dx with BPPV which would resolve with epley maneuver. She saw ENT (Dr. Rose) recently for this but has not had relief this time. She was given augmentin as well due to concurrent congestion but also has not improved with this. She describes dizziness as unsteadiness when raising head or when changing positions from supine to sitting/standing. She would describe this more as lightheadedness than spinning. No tinnitus or hearing loss. No associated cp/sob/palpitations. No new neuro deficits or HA. Her ENT has asked her to complete brain imaging but she has not yet completed this but will do so. Orthostatics normal here today.     Review of Systems   Constitutional: Negative for fever and unexpected weight change.   HENT: Negative for ear pain, rhinorrhea and sore throat.    Eyes: Negative for visual disturbance.   Respiratory: Negative for cough and shortness of breath.    Cardiovascular: Negative for chest pain, palpitations and leg swelling.   Neurological: Positive for dizziness. Negative for weakness, numbness and headaches.   Psychiatric/Behavioral: Negative for dysphoric mood.       Objective:      Physical Exam   Constitutional: She is oriented to person, place, and time. She appears well-developed and well-nourished.   HENT:   Right Ear: Tympanic membrane, external ear and ear canal normal.   Left Ear: Tympanic membrane, external ear and ear canal normal.   Nose: No mucosal edema or rhinorrhea.   Mouth/Throat: No oropharyngeal exudate or posterior oropharyngeal erythema.   Eyes: Pupils are equal, round, and reactive to light. EOM are normal.   Neck: Neck supple. No thyromegaly present.   Cardiovascular: Normal rate, regular rhythm and normal heart sounds.   Pulmonary/Chest: Effort normal and breath sounds normal.   Musculoskeletal:  She exhibits no edema.   Lymphadenopathy:     She has no cervical adenopathy.   Neurological: She is alert and oriented to person, place, and time. No cranial nerve deficit or sensory deficit. Coordination and gait normal.   Psychiatric: She has a normal mood and affect. Her behavior is normal.       Assessment:       1. Dizziness        Plan:       1. Complete ENT w/u as this seems c/w peripheral cause of dizziness  2. Trial of meclizine--proper use d/w pt  3. ED and RTC prompts d/w pt and she understands  4. Nursing BP visit in 2 wks

## 2019-07-22 DIAGNOSIS — F43.9 SITUATIONAL STRESS: Primary | ICD-10-CM

## 2019-07-22 RX ORDER — ALPRAZOLAM 0.5 MG/1
0.5 TABLET ORAL DAILY PRN
Qty: 10 TABLET | Refills: 0 | Status: SHIPPED | OUTPATIENT
Start: 2019-07-22 | End: 2019-08-06

## 2019-07-22 NOTE — TELEPHONE ENCOUNTER
----- Message from Elyssa Acevedo sent at 7/22/2019 12:41 PM CDT -----  Contact: Self/  [phone]  Type: Patient Call Back    Who called:  Patient    What is the request in detail:  Patient would like to discuss CT Scan results  Thank you      Would the patient rather a call back or a response via [facility]?   Call back    Best call back number:   [phone]

## 2019-07-22 NOTE — TELEPHONE ENCOUNTER
"Pt states that she has completed CT of head and face and results show that CT of face is WNL but CT of R ear show "something" that could not be identified near the medulla and ENT recommends that she has an MRI. Pt states she has been extremely nervous, unable to sleep a full night without waking up. Pt would like something prescribed for temporarily to help with anxiety and to calm her down for MRI scheduled Thursday. Pt states she has had MRI in the past and was also prescribed something to calm her down while having the imaging complete. Message routed to MD. Koch pending authorization.    Informed pt to have records faxed to office.  "

## 2019-07-31 DIAGNOSIS — R00.2 PALPITATIONS: Primary | ICD-10-CM

## 2019-08-06 ENCOUNTER — OFFICE VISIT (OUTPATIENT)
Dept: CARDIOLOGY | Facility: CLINIC | Age: 74
End: 2019-08-06
Payer: COMMERCIAL

## 2019-08-06 ENCOUNTER — HOSPITAL ENCOUNTER (OUTPATIENT)
Dept: CARDIOLOGY | Facility: CLINIC | Age: 74
Discharge: HOME | End: 2019-08-06
Payer: COMMERCIAL

## 2019-08-06 VITALS
WEIGHT: 138.44 LBS | DIASTOLIC BLOOD PRESSURE: 81 MMHG | BODY MASS INDEX: 22.25 KG/M2 | HEART RATE: 65 BPM | HEIGHT: 66 IN | SYSTOLIC BLOOD PRESSURE: 142 MMHG

## 2019-08-06 DIAGNOSIS — E78.2 MIXED HYPERLIPIDEMIA: Chronic | ICD-10-CM

## 2019-08-06 DIAGNOSIS — F41.1 GENERALIZED ANXIETY DISORDER: ICD-10-CM

## 2019-08-06 DIAGNOSIS — I10 ESSENTIAL HYPERTENSION: Chronic | ICD-10-CM

## 2019-08-06 DIAGNOSIS — R42 VERTIGO: Primary | ICD-10-CM

## 2019-08-06 DIAGNOSIS — R00.2 PALPITATIONS: ICD-10-CM

## 2019-08-06 PROCEDURE — 93000 ELECTROCARDIOGRAM COMPLETE: CPT | Mod: S$GLB,,, | Performed by: INTERNAL MEDICINE

## 2019-08-06 PROCEDURE — 99999 PR PBB SHADOW E&M-EST. PATIENT-LVL III: ICD-10-PCS | Mod: PBBFAC,,, | Performed by: INTERNAL MEDICINE

## 2019-08-06 PROCEDURE — 93000 EKG 12-LEAD: ICD-10-PCS | Mod: S$GLB,,, | Performed by: INTERNAL MEDICINE

## 2019-08-06 PROCEDURE — 99999 PR PBB SHADOW E&M-EST. PATIENT-LVL III: CPT | Mod: PBBFAC,,, | Performed by: INTERNAL MEDICINE

## 2019-08-06 PROCEDURE — 99214 PR OFFICE/OUTPT VISIT, EST, LEVL IV, 30-39 MIN: ICD-10-PCS | Mod: S$GLB,,, | Performed by: INTERNAL MEDICINE

## 2019-08-06 PROCEDURE — 99214 OFFICE O/P EST MOD 30 MIN: CPT | Mod: S$GLB,,, | Performed by: INTERNAL MEDICINE

## 2019-08-06 RX ORDER — AMLODIPINE BESYLATE 5 MG/1
5 TABLET ORAL DAILY
Qty: 30 TABLET | Refills: 11 | Status: SHIPPED | OUTPATIENT
Start: 2019-08-06 | End: 2019-08-26

## 2019-08-06 NOTE — PROGRESS NOTES
Subjective:   Chief Complaint: Palpitations (6 month f/u ); Anxiety; and Dizziness (x 2 months)  Last Clinic Visit:2/28/2019     History of Present Illness: Cecilia Munguia is a 74 y.o.  lady with hypertension, osteoarthritis, and chronic inflammatory demyelinating polyneuropathy who presents for follow-up.  Interval History:  She successfully had hip surgery without any cardiac events.  Walking well today.  Main issues today have to do with lightheadedness and vertigo.  She admits that she has difficulty telling the to apart, she has a bad sensation in her head when she moves from bending over to sitting up very fast.  Reportedly had orthostatics checked by her primary care physician which were negative.  Brings in a blood pressure log at home showing systolic blood pressures ranging anywhere from 115-150, no low blood pressures.  She also endorses a funny feeling in her head when she moves from looking down to looking up, and when going to lay down moving her head.  Feels like a wave of lightheadedness and dizziness comes over her.  She saw her ENT who performed extensive workup including CT, and MRI, largely which were unrevealing, and thus they recommended she follow with cardiology and neurology.  She continues to have symptoms which are somewhat debilitating.  She also has issues related to anxiety, depression, and insomnia which we discussed at length today.  Symptoms are worse in the morning, better throughout the day.  She also has occasional palpitations at night.  Drinks approximately one glass of wine daily    2/28/2019   She denies any anginal chest pain, no shortness of breath, no significant orthopnea, no lower extremity edema, and is fairly active at baseline, able to exercise to a moderate intensity without any significant difficulties.  She denies any chronic kidney disease, no diabetes, no stroke, no congestive heart failure, and no atherosclerotic disease.  She is scheduled for a hip surgery, and  had an EKG ordered as part of her preoperative evaluation, which showed possible septal infarct, thus cardiology evaluation was scheduled.  She reports some GERD related pain, but no anginal chest pain, or exertional symptoms. She reports that typically blood pressure well controlled, on HCTZ.    PMHx:  Chronic inflammatory demyelinating polyneuropathy  Hypertension  Osteoarthritis    Review of Systems   Constitution: Negative.   HENT: Negative.    Eyes: Negative.    Cardiovascular: Positive for palpitations.   Respiratory: Negative.    Hematologic/Lymphatic: Negative.    Skin: Negative.    Musculoskeletal: Negative.    Gastrointestinal: Negative.    Genitourinary: Negative.    Neurological: Positive for light-headedness, loss of balance and vertigo.   Psychiatric/Behavioral: The patient has insomnia.        Medications:  Current Outpatient Medications on File Prior to Visit   Medication Sig    fluticasone propionate (FLONASE) 50 mcg/actuation nasal spray SPRAY 2 SPRAYS INTO EACH NOSTRIL EVERY DAY    hydroCHLOROthiazide (HYDRODIURIL) 25 MG tablet Take 1 tablet (25 mg total) by mouth once daily.    L. rhamnosus GG/inulin (CULTURELLE PROBIOTICS ORAL) Take 1 tablet by mouth once daily.    omeprazole (PRILOSEC) 20 MG capsule Take 20 mg by mouth Daily.     simethicone (MYLICON) 80 MG chewable tablet Take 80 mg by mouth every 6 (six) hours as needed for Flatulence.    meclizine (ANTIVERT) 12.5 mg tablet Take 1 tablet (12.5 mg total) by mouth 2 (two) times daily as needed for Dizziness.    [DISCONTINUED] ALPRAZolam (XANAX) 0.5 MG tablet Take 1 tablet (0.5 mg total) by mouth daily as needed for Anxiety.    [DISCONTINUED] mirabegron (MYRBETRIQ) 50 mg Tb24 Take 1 tablet (50 mg total) by mouth once daily.     No current facility-administered medications on file prior to visit.      Family History:  Cecilia's family history includes Diabetes in her brother and mother; Heart attack in her brother; Heart disease in her  "brother and father; Hypertension in her father.    Social History:  Cecilia reports that she quit smoking about 4 years ago. She has never used smokeless tobacco. She reports that she drinks alcohol. She reports that she does not use drugs.  She is an artist,  Objective:   BP (!) 142/81 (BP Location: Left arm, Patient Position: Sitting, BP Method: Medium (Automatic))   Pulse 65   Ht 5' 6" (1.676 m)   Wt 62.8 kg (138 lb 7.2 oz)   BMI 22.35 kg/m²     Physical Exam   Constitutional: She is oriented to person, place, and time. No distress.   HENT:   Head: Normocephalic and atraumatic.   Mouth/Throat: No oropharyngeal exudate.   Eyes: EOM are normal. No scleral icterus.   Neck: No JVD present. No tracheal deviation present. No thyromegaly present.   Cardiovascular: Normal rate and regular rhythm. Exam reveals no gallop and no friction rub.   No murmur heard.  Pulmonary/Chest: Effort normal and breath sounds normal. No respiratory distress. She has no wheezes. She has no rales. She exhibits no tenderness.   Abdominal: Soft. She exhibits no distension. There is no tenderness. There is no rebound and no guarding.   Musculoskeletal:   Noted bilateral neuropathic changes in hands   Neurological: She is alert and oriented to person, place, and time.   Skin: Skin is warm and dry. She is not diaphoretic. No erythema.   Psychiatric: Affect normal.     EKG:  My independent visualization of most recent EKG is normal sinus rhythm, small R-waves in V1 and V2, thus computerized read as septal infarct.    Lipids:  Recent Labs   Lab 01/16/19  0859   LDL Cholesterol 144 H      Cholesterol 268 H            Assessment:     1. Vertigo    2. Essential hypertension    3. Generalized anxiety disorder    4. Mixed hyperlipidemia        Plan:   1. Vertigo  Symptoms are unclear in nature, whether this is vertigo or orthostasis.  Do not suspect orthostasis in the setting of symptoms when she moves her head from looking down to  looking up, " and when moving her head over at night.  Discussed that very unlikely but possible hydrochlorothiazide could be contributing to fluid imbalance and inner ear.  Will stop medication, see him she has symptom improvement in one week, if symptoms improved thin start amlodipine as an alternative.    2. Essential hypertension  Offering amlodipine as an alternative, she will stop hydrochlorothiazide for one week, and then check if symptoms have improved, if they have not improved then she will start back on hydrochlorothiazide and not take amlodipine.  If the have improved then she will stop hydrochlorothiazide and take amlodipine.  - amLODIPine (NORVASC) 5 MG tablet; Take 1 tablet (5 mg total) by mouth once daily.  Dispense: 30 tablet; Refill: 11    3. Generalized anxiety disorder  Discussed different non pharmacological therapies for this    4. Mixed hyperlipidemia  LDL elevated, but with demyelinating neuropathy she is not interested in statin at this time        Follow up in about 6 months (around 2/6/2020).

## 2019-08-12 ENCOUNTER — TELEPHONE (OUTPATIENT)
Dept: CARDIOLOGY | Facility: CLINIC | Age: 74
End: 2019-08-12

## 2019-08-12 NOTE — TELEPHONE ENCOUNTER
"Patient following up after OV last week with update after HCTZ was stopped and states dizziness did not go away but did have improvement. This past Friday was able to go shopping and yesterday was ok, but today feels head is heavy, "feels bad" Denies CP or SOB. BPaverage /'s systolic. Today 135/70, and later 152/79.  Patient would like OK to  Amlodipine and stay off of HCTZ although states stopping HCTZ did not totally resolve dizziness but did improve. Message routed to on call Dr. Garcia for review    [phone]      "

## 2019-08-12 NOTE — TELEPHONE ENCOUNTER
----- Message from Estefania Barron sent at 8/12/2019  8:05 AM CDT -----  Contact: pt [phone]  Please call pt in ref to her blood pressure meds  LOV 8/6/19 Dr. Méndez  [facility]/pharmacy #[id number] - [facility] [address] [facility] [phone] (Phone)  [phone] (Fax)    Thanks

## 2019-08-13 ENCOUNTER — TELEPHONE (OUTPATIENT)
Dept: NEUROLOGY | Facility: CLINIC | Age: 74
End: 2019-08-13

## 2019-08-13 NOTE — TELEPHONE ENCOUNTER
----- Message from Liu De Luna sent at 8/12/2019  8:11 AM CDT -----  Contact: Patient @ [phone]  Patient requesting a call she didn't get details. pls call

## 2019-08-13 NOTE — TELEPHONE ENCOUNTER
Returned call to Mrs. Brooks stated that she will need to establish care with Dr. Glass after complete appt with ENT.     C/O dizziness x 2 months. Patient completed ENT visit, MRI and Cat Scan. Patient states that she is off balance and concerned with falling. Per patient, she was referred to Cardiologist that completed EKG. Cardiologist indicated the dizziness is positional and not related to heart.     Have appt with Dr. Hernandez, PCP on tomorrow to discuss change in blood pressure medication, HCTZ, changed by Cardiologist. Also, ENT appt scheduled on 8/28/19.

## 2019-08-14 ENCOUNTER — TELEPHONE (OUTPATIENT)
Dept: OTOLARYNGOLOGY | Facility: CLINIC | Age: 74
End: 2019-08-14

## 2019-08-14 ENCOUNTER — OFFICE VISIT (OUTPATIENT)
Dept: INTERNAL MEDICINE | Facility: CLINIC | Age: 74
End: 2019-08-14
Payer: COMMERCIAL

## 2019-08-14 VITALS
WEIGHT: 141.31 LBS | OXYGEN SATURATION: 96 % | BODY MASS INDEX: 22.71 KG/M2 | SYSTOLIC BLOOD PRESSURE: 122 MMHG | DIASTOLIC BLOOD PRESSURE: 88 MMHG | HEART RATE: 64 BPM | HEIGHT: 66 IN

## 2019-08-14 DIAGNOSIS — R42 DIZZINESS: Primary | ICD-10-CM

## 2019-08-14 PROCEDURE — 99214 OFFICE O/P EST MOD 30 MIN: CPT | Mod: S$GLB,,, | Performed by: INTERNAL MEDICINE

## 2019-08-14 PROCEDURE — 99999 PR PBB SHADOW E&M-EST. PATIENT-LVL III: ICD-10-PCS | Mod: PBBFAC,,, | Performed by: INTERNAL MEDICINE

## 2019-08-14 PROCEDURE — 99214 PR OFFICE/OUTPT VISIT, EST, LEVL IV, 30-39 MIN: ICD-10-PCS | Mod: S$GLB,,, | Performed by: INTERNAL MEDICINE

## 2019-08-14 PROCEDURE — 99999 PR PBB SHADOW E&M-EST. PATIENT-LVL III: CPT | Mod: PBBFAC,,, | Performed by: INTERNAL MEDICINE

## 2019-08-14 RX ORDER — SERTRALINE HYDROCHLORIDE 25 MG/1
25 TABLET, FILM COATED ORAL DAILY
Qty: 30 TABLET | Refills: 3 | Status: SHIPPED | OUTPATIENT
Start: 2019-08-14 | End: 2019-09-25

## 2019-08-14 NOTE — PROGRESS NOTES
Subjective:       Patient ID: Cecilia Munguia is a 74 y.o. female.    Chief Complaint: Dizziness    Pt continues with dizziness for 3 months. I saw her 1 month ago and encouraged her to complete w/u with ENT which she did but no etiology has been discovered. She also saw cardiology who completed w/u and did not feel her symptoms were cardiac. They did stop hctz to see if this would help. She reports it may have helped some but still with dizziness. She has had dizziness before and been dx with BPPV which would resolve with epley maneuver. She recently saw Dr. Munson with ENT who gave augmentin due to congestion and suspected sinus disease but also has not improved with this. She describes dizziness as unsteadiness when raising head or when changing positions from supine to sitting/standing. She would describe this more as lightheadedness than spinning. No tinnitus or hearing loss. No associated cp/sob/palpitations. No new neuro deficits or HA. Brain imaging including imaging of the IACs were normal. Orthostatics have been normal.     She admits to anxiety. She has previously been on lexapro but caused bad dreams so stopped this. We had her on zoloft thereafter and she did well but decided to stop b/c she started feeling better. No SI/HI. She is agreeable to restarting zoloft. Declines psychology referral.     Review of Systems   Constitutional: Negative for fever and unexpected weight change.   HENT: Negative for ear pain, rhinorrhea and sore throat.    Eyes: Negative for visual disturbance.   Respiratory: Negative for cough and shortness of breath.    Cardiovascular: Negative for chest pain, palpitations and leg swelling.   Endocrine: Positive for cold intolerance.   Neurological: Positive for dizziness. Negative for weakness, numbness and headaches.   Psychiatric/Behavioral: Negative for dysphoric mood. The patient is nervous/anxious.        Objective:      Physical Exam   Constitutional: She is oriented to person,  place, and time. She appears well-developed and well-nourished.   Musculoskeletal: She exhibits edema.   Trace bilat LE edema   Neurological: She is alert and oriented to person, place, and time. Gait normal.   Psychiatric: Her behavior is normal. Her mood appears anxious.       Assessment:       1. Dizziness        Plan:       1. Pt has appt with another ENT for 2nd opinion which she should complete as it does appear her dizziness is ENT related  2. Consider neuro eval if above unrevealing  3. Start zoloft 25mg daily  4. She will report via [facility] in 4 weeks how she is doing  5. May consider going back to hctz if amlodipine causing LE swelling as it seems less likely the hctz is contributing to her dizziness  40min of 45mins appt spent with pt and  face to face discussing above

## 2019-08-14 NOTE — TELEPHONE ENCOUNTER
----- Message from Elda Argueta sent at 8/14/2019 10:33 AM CDT -----  Contact: EDUAR SEAY [[id number]]   Name of Who is Calling: EDUAR SEAY [[id number]]       What is the request in detail: Patient is requesting a call from staff in regards to rescheduling her appointment she states she is not feeling well and wants to be seen sooner than her scheduled appointment 08-28-19 ..... Please contact to further discuss and advise.     Can the clinic reply by [facility]:      What Number [facility]: [phone]

## 2019-08-19 ENCOUNTER — TELEPHONE (OUTPATIENT)
Dept: CARDIOLOGY | Facility: CLINIC | Age: 74
End: 2019-08-19

## 2019-08-19 ENCOUNTER — PATIENT OUTREACH (OUTPATIENT)
Dept: ADMINISTRATIVE | Facility: OTHER | Age: 74
End: 2019-08-19

## 2019-08-19 NOTE — TELEPHONE ENCOUNTER
Patient giving update one week since taking Amlodipine 5mg qd. BP running 140's systolic sometimes 130's, highest reading once was 160. Patient c/o of swollen feet and ankles since med change. Medication last discontinued was HCTZ.  States dizziness has improved and has plans to follow up with ENT Wednesday. Patient questioning if should make any med changes at this time. Message routed to Dr. Méndez for review.

## 2019-08-19 NOTE — TELEPHONE ENCOUNTER
----- Message from Katrina Urias sent at 8/19/2019 10:17 AM CDT -----  Contact: Pt called   Pt BP has been high and she's wondering if the dosage for medication amLODIPine (NORVASC) 5 MG tablet. Lv 8/6/19 Dr. Méndez. Please call pt @ [phone]. Thank you.

## 2019-08-20 ENCOUNTER — TELEPHONE (OUTPATIENT)
Dept: CARDIOLOGY | Facility: CLINIC | Age: 74
End: 2019-08-20

## 2019-08-20 RX ORDER — LOSARTAN POTASSIUM 25 MG/1
25 TABLET ORAL DAILY
COMMUNITY
End: 2019-08-26 | Stop reason: SDUPTHER

## 2019-08-20 NOTE — TELEPHONE ENCOUNTER
Spoke with patient and advised as per Dr. Méndez. Patient would like Losartan called to pharmacy. Patient is to see ENT tomorrow and will discuss with MD as well. Patient states will follow back up tomorrow after appt.

## 2019-08-20 NOTE — TELEPHONE ENCOUNTER
----- Message from Katrina Urias sent at 8/20/2019  8:08 AM CDT -----  Contact: Pt called   Pt returning call regarding her BP. Lv 8/6/19 Dr. Méndez. Please call pt @ [phone]. Thank you.

## 2019-08-21 ENCOUNTER — OFFICE VISIT (OUTPATIENT)
Dept: OTOLARYNGOLOGY | Facility: CLINIC | Age: 74
End: 2019-08-21
Payer: COMMERCIAL

## 2019-08-21 VITALS
BODY MASS INDEX: 22.66 KG/M2 | TEMPERATURE: 98 F | WEIGHT: 141 LBS | HEIGHT: 66 IN | HEART RATE: 64 BPM | SYSTOLIC BLOOD PRESSURE: 142 MMHG | DIASTOLIC BLOOD PRESSURE: 82 MMHG

## 2019-08-21 DIAGNOSIS — R09.81 STUFFY HEAD: ICD-10-CM

## 2019-08-21 DIAGNOSIS — R42 DIZZINESS: Primary | ICD-10-CM

## 2019-08-21 DIAGNOSIS — H90.3 SENSORINEURAL HEARING LOSS (SNHL) OF BOTH EARS: ICD-10-CM

## 2019-08-21 DIAGNOSIS — H93.8X3 STUFFY EARS, BILATERAL: ICD-10-CM

## 2019-08-21 DIAGNOSIS — I10 ESSENTIAL HYPERTENSION: ICD-10-CM

## 2019-08-21 DIAGNOSIS — Z86.59 HISTORY OF DEPRESSION: ICD-10-CM

## 2019-08-21 DIAGNOSIS — G61.81 CHRONIC INFLAMMATORY DEMYELINATING NEUROPATHY: ICD-10-CM

## 2019-08-21 DIAGNOSIS — Z86.59 HISTORY OF ANXIETY: ICD-10-CM

## 2019-08-21 PROCEDURE — 99205 OFFICE O/P NEW HI 60 MIN: CPT | Mod: S$GLB,,, | Performed by: OTOLARYNGOLOGY

## 2019-08-21 PROCEDURE — 99205 PR OFFICE/OUTPT VISIT, NEW, LEVL V, 60-74 MIN: ICD-10-PCS | Mod: S$GLB,,, | Performed by: OTOLARYNGOLOGY

## 2019-08-21 RX ORDER — AZELASTINE 1 MG/ML
SPRAY, METERED NASAL
Qty: 30 ML | Refills: 2 | Status: SHIPPED | OUTPATIENT
Start: 2019-08-21 | End: 2019-09-04 | Stop reason: SDUPTHER

## 2019-08-21 NOTE — PATIENT INSTRUCTIONS
Change to azelastine nasal spray 2 sprays in each nostril twice a day.  Use OTC saline spray prior / as needed.  Use OTC Ayr nasal gel at bedtime / as needed.    Follow up with copy of recent audiogram / hearing test for recheck and possible CRP.      Do not take vestibular suppressants.

## 2019-08-24 ENCOUNTER — NURSE TRIAGE (OUTPATIENT)
Dept: ADMINISTRATIVE | Facility: CLINIC | Age: 74
End: 2019-08-24

## 2019-08-24 DIAGNOSIS — I10 ESSENTIAL HYPERTENSION: Primary | Chronic | ICD-10-CM

## 2019-08-24 NOTE — TELEPHONE ENCOUNTER
"    Reason for Disposition   [1] MODERATE dizziness (e.g., interferes with normal activities) AND [2] has been evaluated by physician for this    Additional Information   Negative: Severe difficulty breathing (e.g., struggling for each breath, speaks in single words)   Negative: [1] Difficulty breathing or swallowing AND [2] started suddenly after medicine, an allergic food or bee sting   Negative: Shock suspected (e.g., cold/pale/clammy skin, too weak to stand, low BP, rapid pulse)   Negative: Difficult to awaken or acting confused (e.g., disoriented, slurred speech)   Negative: [1] Weakness (i.e., paralysis, loss of muscle strength) of the face, arm or leg on one side of the body AND [2] sudden onset AND [3] present now   Negative: [1] Numbness (i.e., loss of sensation) of the face, arm or leg on one side of the body AND [2] sudden onset AND [3] present now   Negative: [1] Loss of speech or garbled speech AND [2] sudden onset AND [3] present now   Negative: Overdose (accidental or intentional) of medications   Negative: [1] Fainted > 15 minutes ago AND [2] still feels too weak or dizzy to stand   Negative: Heart beating < 50 beats per minute OR > 140 beats per minute   Negative: Sounds like a life-threatening emergency to the triager   Negative: Chest pain   Negative: Rectal bleeding, bloody stool, or tarry-black stool   Negative: [1] Vomiting AND [2] contains red blood or black ("coffee ground") material   Negative: Vomiting is main symptom   Negative: Diarrhea is main symptom   Negative: Headache is main symptom   Negative: Patient states that he/she is having an anxiety/panic attack   Negative: Dizziness from low blood sugar (i.e., < 60 mg/dl or 3.5 mmol/l)   Negative: Dizziness is described as a spinning sensation (i.e., vertigo)   Negative: Heat exhaustion suspected (i.e., dehydration from heat exposure)   Negative: Difficulty breathing   Negative: SEVERE dizziness (e.g., unable to " "stand, requires support to walk, feels like passing out now)   Negative: Extra heart beats OR irregular heart beating  (i.e., "palpitations")   Negative: [1] Drinking very little AND [2] dehydration suspected (e.g., no urine > 12 hours, very dry mouth, very lightheaded)   Negative: Patient sounds very sick or weak to the triager   Negative: [1] Dizziness caused by heat exposure, sudden standing, or poor fluid intake AND [2] no improvement after 2 hours of rest and fluids   Negative: [1] Fever > 103 F (39.4 C) AND [2] not able to get the fever down using Fever Care Advice   Negative: [1] Fever > 101 F (38.3 C) AND [2] age > 60   Negative: [1] Fever > 100.0 F (37.8 C) AND [2] bedridden (e.g., nursing home patient, CVA, chronic illness, recovering from surgery)   Negative: [1] Fever > 100.0 F (37.8 C) AND [2] diabetes mellitus or weak immune system (e.g., HIV positive, cancer chemo, splenectomy, chronic steroids)   Negative: [1] MODERATE dizziness (e.g., interferes with normal activities) AND [2] has NOT been evaluated by physician for this  (Exception: dizziness caused by heat exposure, sudden standing, or poor fluid intake)   Negative: Fever present > 3 days (72 hours)   Negative: Taking a medicine that could cause dizziness (e.g., blood pressure medications, diuretics)    Protocols used: DIZZINESS - [id number]    Pt stated she thinks Losartan or new nasal spray( Azelastine) made her dizzy. B/P 140/70. Pt admits to drinking alcohol last night. Asked if she should take her B/P medication.Pt advised to take medication as prescribed and not to drink alcohol. Advised to call back if she feels worse. Pt stated she will not take her Nasal spray anymore because it makes her too sleepy.   "

## 2019-08-26 RX ORDER — LOSARTAN POTASSIUM 100 MG/1
100 TABLET ORAL DAILY
Qty: 90 TABLET | Refills: 3 | Status: SHIPPED | OUTPATIENT
Start: 2019-08-26 | End: 2019-09-25

## 2019-08-26 NOTE — TELEPHONE ENCOUNTER
Nasal spray made her dizzy and sleepy (azelastine) and all around miserable.  Dr. Olsen said to just stop spray altogether and keep her follow up

## 2019-08-27 NOTE — TELEPHONE ENCOUNTER
Called patient she was somewhat confused but course of events, and recurrent episodes of vertigo.  Nonetheless she reports taking 50 mg of losartan currently, no longer taking hydrochlorothiazide and feels better, no longer taking amlodipine as she has swelling.  Thus will increase losartan to 100 mg.    -Manoj Méndez

## 2019-08-30 ENCOUNTER — PATIENT OUTREACH (OUTPATIENT)
Dept: ADMINISTRATIVE | Facility: OTHER | Age: 74
End: 2019-08-30

## 2019-09-03 NOTE — PROGRESS NOTES
Subjective:       Patient ID: Cecilia Munguia is a 74 y.o. female.    Chief Complaint: Dizziness (2-3 mo)    She is a former patient of mine last seen in 2012.  She is here today for evaluation of dizziness for the past 2 and half months.   had total hip replacement in March 2019, then about 1 month later began with dizziness occurring while getting into the bed supine and turning on the right side and also during physical therapy sessions.  Describes a brief sensation like a wave and then clears.   has had BPPV in the past which cleared with CRP however this time it feels a bit different.  Saw physician and received antibiotics and prednisone without relief.  Also had imaging which was negative.  Overall symptoms have improved but still has episodes bending over and at other times as well.  Denies syncope or near-syncope.  No associated chest pain or palpitations or shortness of breath.  No tinnitus or fluxes in hearing.  Also has had some fullness or stuffiness in her head and ears.  Denies history of migraine.  Nose has been open and clear.  Nevertheless has tried fluticasone without relief and some dryness.   experienced depression after her hip surgery and just started Zoloft.  Saw PCP and cardiologist and orthostatics negative.  However HCTZ discontinued and currently in the process of selecting a substitute for amlodipine.  Denies vestibular suppressants.  Past medical history pertinent for chronic demyelinating neuropathy since age 18.        Review of Systems   Ears: Positive for hearing loss, dizziness and family history of hearing loss.  Negative for ear pain, ear pressure, ringing in ear, ear discharge, ear infections and taken gentramycin/streptomycin.    Nose:  Positive for postnasal drip. Negative for nasal obstruction, nasal or sinus surgery, loss of smell and snoring.    Mouth/Throat: Negative for pain swallowing, impaired swallowing, throat mass and neck mass.   Constitutional:  Negative for recent unexplained weight loss and fever.    Eyes:  Negative for history of glaucoma and visual change.   Cardiovascular:  Positive for history of high blood pressure. Negative for chest pain and palpitations.   Respiratory:  Negative for asthma, emphysema, history of tuberculosis, recent cough and shortness of breath.    Gastrointestinal:  Positive for history of stomach ulcers or pain and acid reflux. Negative for indigestion and blood in stool.   Other:  Positive for depression and anxiety. Negative for kidney problem, bladder problem, arthritis, weakness, disturbances in coordination, slurred, swollen glands, anemia and persistent infections.           Objective:        Vitals:    08/21/19 1508   BP: (!) 142/82   Pulse: 64   Temp: 98.4 °F (36.9 °C)     Body mass index is 22.76 kg/m².  Physical Exam   Constitutional: She is oriented to person, place, and time. She appears well-developed and well-nourished. No distress.   HENT:   Head: Normocephalic and atraumatic.   Right Ear: Tympanic membrane, external ear and ear canal normal.   Left Ear: Tympanic membrane, external ear and ear canal normal.   Nose: No mucosal edema, rhinorrhea or nasal deformity. No epistaxis.   Mouth/Throat: Oropharynx is clear and moist and mucous membranes are normal. No oral lesions. No uvula swelling. No oropharyngeal exudate, posterior oropharyngeal edema or posterior oropharyngeal erythema.   Small ear canals with increased cerumen cleared and otherwise within limits bilaterally.   Neck: Phonation normal. Neck supple. No tracheal deviation present.   Pulmonary/Chest: Effort normal. No respiratory distress.   Lymphadenopathy:     She has no cervical adenopathy.   Neurological: She is alert and oriented to person, place, and time. She displays no weakness. No cranial nerve deficit.   Contractures of fingers hands attributes to chronic demyelinating neuropathy since age 18.   Skin: Skin is warm and dry.   Psychiatric: She has  a normal mood and affect. Her behavior is normal. Her speech is not slurred.       Tests / Results:    Reviewed outside CT head report done 07/17/2019 and outside MRI of head report done 07/25/2019 and [facility] report done 06/06/2019.    Reviewed last available audiogram done here 3/ 3 /2016.        Assessment:       1. Dizziness    2. Stuffy head    3. Stuffy ears, bilateral    4. Essential hypertension    5. History of depression    6. History of anxiety    7. Chronic inflammatory demyelinating neuropathy    8. Sensorineural hearing loss (SNHL) of both ears        Plan:        Reviewed all above in detail and considerations and recommendations and answered questions.  Will remain off of vestitubular suppressants, watch salt intake and avoid MSG and other common potential triggers.  BP Rx as per cardiologist/PCP.  Change to azelastine nasal spray and reviewed moisturizing measures for the nose.  Follow-up with recent audiogram for recheck and possible CRP.  Face-to-face time greater than 50 min counseling regarding all the above.

## 2019-09-04 ENCOUNTER — OFFICE VISIT (OUTPATIENT)
Dept: OTOLARYNGOLOGY | Facility: CLINIC | Age: 74
End: 2019-09-04
Payer: COMMERCIAL

## 2019-09-04 ENCOUNTER — TELEPHONE (OUTPATIENT)
Dept: INTERNAL MEDICINE | Facility: CLINIC | Age: 74
End: 2019-09-04

## 2019-09-04 VITALS
BODY MASS INDEX: 22.5 KG/M2 | DIASTOLIC BLOOD PRESSURE: 78 MMHG | TEMPERATURE: 98 F | HEIGHT: 66 IN | HEART RATE: 58 BPM | WEIGHT: 140 LBS | SYSTOLIC BLOOD PRESSURE: 120 MMHG

## 2019-09-04 DIAGNOSIS — Z86.59 HISTORY OF ANXIETY: ICD-10-CM

## 2019-09-04 DIAGNOSIS — Z87.898 HISTORY OF DIZZINESS: ICD-10-CM

## 2019-09-04 DIAGNOSIS — Z86.59 HISTORY OF DEPRESSION: ICD-10-CM

## 2019-09-04 DIAGNOSIS — H90.3 SENSORINEURAL HEARING LOSS (SNHL) OF BOTH EARS: ICD-10-CM

## 2019-09-04 DIAGNOSIS — G61.81 CHRONIC INFLAMMATORY DEMYELINATING NEUROPATHY: ICD-10-CM

## 2019-09-04 DIAGNOSIS — I10 ESSENTIAL HYPERTENSION: ICD-10-CM

## 2019-09-04 DIAGNOSIS — R42 DIZZINESS: ICD-10-CM

## 2019-09-04 PROCEDURE — 99214 PR OFFICE/OUTPT VISIT, EST, LEVL IV, 30-39 MIN: ICD-10-PCS | Mod: S$GLB,,, | Performed by: OTOLARYNGOLOGY

## 2019-09-04 PROCEDURE — 99214 OFFICE O/P EST MOD 30 MIN: CPT | Mod: S$GLB,,, | Performed by: OTOLARYNGOLOGY

## 2019-09-04 NOTE — PATIENT INSTRUCTIONS
Follow up blood pressure questions with PCP and/or cardiologist.    OTC Afrin nasal spray prior to flying as discussed and insufflation exercises.    Follow up as needed as discussed.

## 2019-09-04 NOTE — PROGRESS NOTES
Subjective:       Patient ID: Cecilia Munguia is a 74 y.o. female.    Chief Complaint: Follow-up (has cc of recent outside auido)    Last seen 08/21/2019 with history as follows:   She is a former patient of mine last seen in 2012.  She is here today for evaluation of dizziness for the past 2 and half months.  States had total hip replacement in March 2019, then about 1 month later began with dizziness occurring while getting into the bed supine and turning on the right side and also during physical therapy sessions.  Describes a brief sensation like a wave and then clears.  States has had BPPV in the past which cleared with CRP however this time it feels a bit different.  Saw physician and received antibiotics and prednisone without relief.  Also had imaging which was negative.  Overall symptoms have improved but still has episodes bending over and at other times as well.  Denies syncope or near-syncope.  No associated chest pain or palpitations or shortness of breath.  No tinnitus or fluxes in hearing.  Also has had some fullness or stuffiness in her head and ears.  Denies history of migraine.  Nose has been open and clear.  Nevertheless has tried fluticasone without relief and some dryness.  States experienced depression after her hip surgery and just started Zoloft.  Saw PCP and cardiologist and orthostatics negative.  However HCTZ discontinued and currently in the process of selecting a substitute for amlodipine.  Denies vestibular suppressants.  Past medical history pertinent for chronic demyelinating neuropathy since age 18.    Interval history:  Returns for follow-up.  States has continued to improve and feeling better.  Had done well with no symptoms until about for 5 days ago when noted brief mild sensation bending over, but better since then and feeling fine today.   has had trip planned to [place] in October and patient has questions about this.  No prior ear problems flying.  Also has questions  about her blood pressure medication which is her main focus at this time and again advised to discuss with cardiologist and / or PCP.  Wants to know if HCTZ can cause fluid in the ear and advised it does not.  Has copy of most recent outside audiogram for review as requested.  No new complaints.        Review of Systems   Ears: Positive for hearing loss and family history of hearing loss.  Negative for ear pain, ear pressure, ringing in ear, ear discharge, ear infections and taken gentramycin/streptomycin.    Nose:  Negative for nasal obstruction, nasal or sinus surgery, loss of smell and snoring.    Mouth/Throat: Negative for pain swallowing, impaired swallowing, throat mass and neck mass.   Constitutional: Negative for recent unexplained weight loss and fever.    Eyes:  Negative for history of glaucoma and visual change.   Cardiovascular:  Positive for history of high blood pressure. Negative for chest pain and palpitations.   Respiratory:  Negative for asthma, emphysema, history of tuberculosis, recent cough and shortness of breath.    Gastrointestinal:  Positive for history of stomach ulcers or pain and acid reflux. Negative for indigestion and blood in stool.   Other:  Positive for depression and anxiety. Negative for kidney problem, bladder problem, arthritis, weakness, disturbances in coordination, slurred, swollen glands, anemia and persistent infections.           Objective:        Vitals:    09/04/19 0938   BP: 120/78   Pulse: (!) 58   Temp: 98.2 °F (36.8 °C)     Body mass index is 22.6 kg/m².  Physical Exam   Constitutional: She is oriented to person, place, and time. She appears well-developed and well-nourished. No distress.   HENT:   Head: Normocephalic and atraumatic.   Right Ear: Tympanic membrane, external ear and ear canal normal.   Left Ear: Tympanic membrane, external ear and ear canal normal.   Nose: No mucosal edema, rhinorrhea or nasal deformity. No epistaxis.   Mouth/Throat: Oropharynx is  clear and moist and mucous membranes are normal. No oral lesions. No uvula swelling. No oropharyngeal exudate, posterior oropharyngeal edema or posterior oropharyngeal erythema.   Neck: Phonation normal. Neck supple. No tracheal deviation present.   Pulmonary/Chest: Effort normal. No respiratory distress.   Lymphadenopathy:     She has no cervical adenopathy.   Neurological: She is alert and oriented to person, place, and time. She displays no weakness. No cranial nerve deficit.   Contractures of fingers hands attributes to chronic demyelinating neuropathy since age 18.   Skin: Skin is warm and dry.   Psychiatric: She has a normal mood and affect. Her behavior is normal. Her speech is not slurred.       Tests / Results:    Outside audiogram done 5/31/2019 reviewed which reveals essentially symmetric thresholds ranging from 20 dB to 50 dB, speech discrimination 76% right ear and 64% left ear, and type A tympanograms bilaterally.    As previously noted and reviewed has had outside MRI of head done 07/25/2019.        Assessment:       1. Sensorineural hearing loss (SNHL) of both ears    2. Dizziness    3. History of dizziness    4. Essential hypertension    5. History of depression    6. History of anxiety    7. Chronic inflammatory demyelinating neuropathy        Plan:        Reviewed all above and recommendations and answered questions.     Follow up blood pressure questions with PCP and/or cardiologist.    OTC Afrin nasal spray prior to flying as discussed and insufflation exercises.    Follow up as needed as discussed.

## 2019-09-04 NOTE — TELEPHONE ENCOUNTER
Agree we need to see BP readings before making decision as her BP was controlled last visit with me off hctz.

## 2019-09-04 NOTE — TELEPHONE ENCOUNTER
Patient presented to clinic asking if she could restart her HCTZ. She stated the cardiologist took her off of it thinking it was causing fluid in her ears. She saw the ENT today, Dr. Olsen, who told her it was not caused by the HCTZ. Advised her we would need to see a list of the last two weeks of her blood pressures to see if she can resume taking it, and even then, Dr. Hernandez would likely defer to her cardiologist. The patient verbalized understanding and had no further questions or concerns.

## 2019-09-04 NOTE — TELEPHONE ENCOUNTER
----- Message from Satish Santiago sent at 9/4/2019 12:14 PM CDT -----  Contact: Pt   Type:  Patient Returning Call    Who Called: EDUAR SEAY [[id number]]    Who Left Message for Patient: Staff      Does the patient know what this is regarding?:No     Best Call Back Number:[phone]    Additional Information:

## 2019-09-06 ENCOUNTER — TELEPHONE (OUTPATIENT)
Dept: OTOLARYNGOLOGY | Facility: CLINIC | Age: 74
End: 2019-09-06

## 2019-09-06 ENCOUNTER — OFFICE VISIT (OUTPATIENT)
Dept: OTOLARYNGOLOGY | Facility: CLINIC | Age: 74
End: 2019-09-06
Payer: COMMERCIAL

## 2019-09-06 VITALS
WEIGHT: 140 LBS | BODY MASS INDEX: 22.5 KG/M2 | HEART RATE: 61 BPM | SYSTOLIC BLOOD PRESSURE: 124 MMHG | HEIGHT: 66 IN | DIASTOLIC BLOOD PRESSURE: 78 MMHG

## 2019-09-06 DIAGNOSIS — H90.3 SENSORINEURAL HEARING LOSS (SNHL) OF BOTH EARS: ICD-10-CM

## 2019-09-06 DIAGNOSIS — R04.0 EPISTAXIS: Primary | ICD-10-CM

## 2019-09-06 DIAGNOSIS — Z97.4 HEARING AID WORN: ICD-10-CM

## 2019-09-06 DIAGNOSIS — J34.2 NASAL SEPTAL DEVIATION: ICD-10-CM

## 2019-09-06 DIAGNOSIS — J30.9 ALLERGIC RHINITIS, UNSPECIFIED SEASONALITY, UNSPECIFIED TRIGGER: ICD-10-CM

## 2019-09-06 PROCEDURE — 31231 NASAL/SINUS ENDOSCOPY: ICD-10-PCS | Mod: S$GLB,,, | Performed by: SPECIALIST

## 2019-09-06 PROCEDURE — 99214 OFFICE O/P EST MOD 30 MIN: CPT | Mod: 25,S$GLB,, | Performed by: SPECIALIST

## 2019-09-06 PROCEDURE — 99214 PR OFFICE/OUTPT VISIT, EST, LEVL IV, 30-39 MIN: ICD-10-PCS | Mod: 25,S$GLB,, | Performed by: SPECIALIST

## 2019-09-06 PROCEDURE — 31231 NASAL ENDOSCOPY DX: CPT | Mod: S$GLB,,, | Performed by: SPECIALIST

## 2019-09-06 NOTE — PROCEDURES
"Nasal/sinus endoscopy  Date/Time: 9/6/2019 5:40 PM    Time out: Immediately prior to procedure a "time out" was called to verify the correct patient, procedure, equipment, support staff and site/side marked as required.  Performed by: PRESTON Franco MD  Authorized by: PRESTON Franco MD     Consent Done?:  Yes (Verbal)  Anesthesia:     Local anesthetic:  Lidocaine 2% and Rah-Synephrine 1/2% (Topical aerosol)    Location: Bilateral rigid nasal endoscopy with video.    Endoscope type: 30 degree, 3 mm rigid nasal telescope.    Patient tolerance:  Patient tolerated the procedure well with no immediate complications  Nose:     Procedure Performed:  Nasal Endoscopy  External:      No external nasal deformity  Intranasal:      Mucosa no polyps     Mucosa ulcers not present     Mucosa lesions present (Mucosal dryness bilaterally, dilated small superficial vessels in left Kiesselbach plexus, dilated superficial vessels bilaterally and remaining part of the nasal passages)     Enlarged turbinates ( inferior turbinates mildly enlarged bilaterally)     Septum gross deformity ( nasal septum deviated to the left with nasal crest deviated to the right, no actively bleeding vessels on either side of the nose)  Nasopharynx:      No mucosa lesions     Adenoids not present     Posterior choanae patent     Eustachian tube patent     Rigid nasal endoscopy-nasal septal deviation, superficial vessels in Kiesselbach's plexus on the left, septum deviated to the left anteriorly with nasal crest deviated to the right, mucosal dryness throughout, no active bleeding on either side of the nose      "

## 2019-09-06 NOTE — TELEPHONE ENCOUNTER
----- Message from Parul Cole sent at 9/6/2019 11:02 AM CDT -----  Contact: EDUAR SEAY [[id number]]  Name of Who is Calling: EDUAR SEAY [[id number]]      What is the request in detail: Pt is requesting to speak with clinical staff in regards to nose bleeds.Please contact to further discuss and advise.       Can the clinic reply by [facility]:       What Number to Call Back if not in [facility]: [phone] or [phone]

## 2019-09-06 NOTE — PROGRESS NOTES
Subjective:       Patient ID: Cecilia Munguia is a 74 y.o. female.    Chief Complaint: Sinus Problem and Epistaxis    The patient he is having recurring episodes of epistaxis on the left side only for several months.  In the last month they recurred 3 times.  They occur spontaneously.  The typically resolve with either packing the nose or compression of the nostril.    Review of Systems   Constitutional: Positive for fatigue. Negative for activity change, appetite change, chills, fever and unexpected weight change.   HENT: Positive for congestion, nosebleeds, postnasal drip, rhinorrhea, sinus pressure and sinus pain. Negative for ear discharge, ear pain, facial swelling, hearing loss, mouth sores, sneezing, sore throat, tinnitus, trouble swallowing and voice change.    Eyes: Negative for photophobia, pain, discharge, redness, itching and visual disturbance.   Respiratory: Negative for apnea, cough, choking, shortness of breath and wheezing.    Cardiovascular: Negative for chest pain and palpitations.   Gastrointestinal: Negative for abdominal distention, abdominal pain, nausea and vomiting.   Musculoskeletal: Negative for arthralgias, myalgias, neck pain and neck stiffness.        Weakness in atrophy of the muscles in both hands secondary to underlying neurologic problems   Skin: Negative.  Negative for color change, pallor and rash.   Allergic/Immunologic: Negative for environmental allergies, food allergies and immunocompromised state.   Neurological: Positive for tremors, weakness and headaches. Negative for dizziness, facial asymmetry, speech difficulty, light-headedness and numbness.        Chronic inflammatory idiopathic polyneuropathy, last episode 4 years ago   Hematological: Negative for adenopathy. Does not bruise/bleed easily.   Psychiatric/Behavioral: Negative for confusion, decreased concentration and sleep disturbance.       Objective:      Physical Exam   Constitutional: She is oriented to person,  place, and time. She appears well-developed and well-nourished.   HENT:   Head: Normocephalic.   Right Ear: External ear and ear canal normal. Tympanic membrane is retracted. Tympanic membrane mobility is abnormal.   Left Ear: External ear and ear canal normal. Tympanic membrane is retracted. Tympanic membrane mobility is abnormal.   Nose: Mucosal edema (with inflamed turbinates bilaterall), rhinorrhea (yellow pus bilaterally) and septal deviation (To the left, no active bleeding, dilated vessels in Kiesselbach's plexus on the left) present. No nasal deformity.   Mouth/Throat: Uvula is midline and mucous membranes are normal. No oral lesions. Posterior oropharyngeal erythema present. No oropharyngeal exudate (erythema). No tonsillar exudate.   Eyes: Pupils are equal, round, and reactive to light. EOM and lids are normal. Right eye exhibits no discharge. Left eye exhibits no discharge. Right conjunctiva is injected. Left conjunctiva is injected.   Neck: Trachea normal, normal range of motion, full passive range of motion without pain and phonation normal. Neck supple. No neck rigidity. No thyroid mass and no thyromegaly present.   Cardiovascular: Normal rate, regular rhythm and normal heart sounds.   Pulmonary/Chest: No respiratory distress. She has decreased breath sounds ( Diffusely). She has no wheezes. She has no rhonchi. She has no rales.   Abdominal: Soft. Bowel sounds are normal. There is no tenderness.   Musculoskeletal: Normal range of motion.        Right shoulder: Normal.   Hands-joint contractures and muscular atrophy by in both hands   Lymphadenopathy:        Head (right side): No occipital adenopathy present.        Head (left side): No occipital adenopathy present.     She has cervical adenopathy.        Right cervical: Superficial cervical, deep cervical and posterior cervical adenopathy present.        Left cervical: Superficial cervical, deep cervical and posterior cervical adenopathy present.    Neurological: She is alert and oriented to person, place, and time. She has normal strength. No cranial nerve deficit or sensory deficit. Gait normal.   Skin: Skin is warm and dry. No lesion, no petechiae and no rash noted. No cyanosis. Nails show no clubbing.   Psychiatric: She has a normal mood and affect. Her speech is normal and behavior is normal. Cognition and memory are normal.       Rigid nasal endoscopy bilaterally-septum deviated to the left with nasal crest deviated to the right, superficial vessels in Kiesselbach's plexus on the left with larger vessels throughout both nasal passages, mucosal dryness throughout the nose, no active bleeding    Assessment:       1. Epistaxis    2. Allergic rhinitis, unspecified seasonality, unspecified trigger    3. Nasal septal deviation    4. Sensorineural hearing loss (SNHL) of both ears    5. Hearing aid worn        Plan:       I am starting the patient on a nasal moisturization protocol.  If the bleeding continues she will need to be cauterized. I will recheck her in 2 weeks.

## 2019-09-06 NOTE — TELEPHONE ENCOUNTER
Called and spoke with pt today she wanted to let you know her nose bleed a lot today. Pt states she's need to see someone today. Patient is coming in at 2:30 pm to see Dr. Franco.

## 2019-09-09 ENCOUNTER — TELEPHONE (OUTPATIENT)
Dept: CARDIOLOGY | Facility: CLINIC | Age: 74
End: 2019-09-09

## 2019-09-09 NOTE — TELEPHONE ENCOUNTER
"Pt is concerned that her BP is elevated in the mornings (before medication) and then goes down to normal at night.     138/74 BP this morning before medicine, 30 minutes later BP was 132/68.     Pt states she will bring her home recordings by the clinic for review tomorrow. States different doctors tell her different normals. Pt states she wants her blood pressure below 120 on top because that is what she considers "normal." Pt denies stress/anxiety raising blood pressure.     Pt is also in contact with Dr Manoj Méndez as of today to discuss this as well.       "

## 2019-09-09 NOTE — TELEPHONE ENCOUNTER
Patient currently taking Losartan 100mg daily. Recent office encounters from 9/6 and 9/4 BP readings show in 's. Patient states SBP running 130's-140's at home occasionally with lowest 110's as well. Denies symptoms. Advised BP not dangerous. Patient previously advised by PCP to provide BP log. As per Dr. Méndez ok to have PCP review BP readings. Advised to come in to have monitor checked and BP check by PCP when able.  Message routed to Dr. Hernandez and staff for review.

## 2019-09-09 NOTE — TELEPHONE ENCOUNTER
Goal BP for pt is 120s/70s with low 130s/low 80s acceptable. These are not dangerous BP readings. If she is getting variability in BP readings, she can split the 100mg losartan tablet in half and take half in AM and half in PM.

## 2019-09-09 NOTE — TELEPHONE ENCOUNTER
----- Message from Laney Lowrycarey sent at 9/9/2019  8:58 AM CDT -----  Contact: pt  Pt states that her BP is running  149/70 this morning and has been running high for the past 3 weeks.  She finds the new medication is not working.  She can be reached at [phone].  She is a pt of Dr. Méndez and LOV was 8/6/19.    Thank you

## 2019-09-10 ENCOUNTER — TELEPHONE (OUTPATIENT)
Dept: INTERNAL MEDICINE | Facility: CLINIC | Age: 74
End: 2019-09-10

## 2019-09-10 ENCOUNTER — TELEPHONE (OUTPATIENT)
Dept: OTOLARYNGOLOGY | Facility: CLINIC | Age: 74
End: 2019-09-10

## 2019-09-10 NOTE — TELEPHONE ENCOUNTER
Spoke with pt about her blood pressure. She brought in a list to the clinic and Dr Hernandez reviewed it. He suggested taking her losartan in 50mg doses BID to try to even out her BP readings throughout the day. She agreed to this and will continue checking her BP at the recommended once, or twice, a day and will update us in a couple of weeks.

## 2019-09-10 NOTE — TELEPHONE ENCOUNTER
----- Message from Anita Neil sent at 9/10/2019  8:20 AM CDT -----  Contact: EDUAR SEAY [[id number]]  Name of Who is Calling: EDUAR SEAY [[id number]]      What is the request in detail:  Patient called requesting a call as this pertains to her upcoming appointment on Friday 09/20/2019. Patient needs to reschedule because of a scheduling conflict. I did attempt to reschedule per patient's request but was unsuccessful. Patient also has questions and concerns with medication.  Please give a call back at your earliest convenience and further advise.  THANKS!      Can the clinic reply by [facility]: NO      Number to Call Back: EDUAR SEAY  / # [phone]

## 2019-09-23 ENCOUNTER — TELEPHONE (OUTPATIENT)
Dept: OTOLARYNGOLOGY | Facility: CLINIC | Age: 74
End: 2019-09-23

## 2019-09-23 NOTE — TELEPHONE ENCOUNTER
----- Message from Parul Cole sent at 9/23/2019  8:50 AM CDT -----  Contact: EDUAR SEAY [[id number]]  Name of Who is Calling: EDUAR SEAY [[id number]]      What is the request in detail: Pt is requesting to speak with clinical staff.Please contact to further discuss and advise.       Can the clinic reply by [facility]: YES      What Number to Call Back if not in [facility]: [phone]

## 2019-09-25 ENCOUNTER — OFFICE VISIT (OUTPATIENT)
Dept: INTERNAL MEDICINE | Facility: CLINIC | Age: 74
End: 2019-09-25
Payer: COMMERCIAL

## 2019-09-25 VITALS
HEIGHT: 66 IN | OXYGEN SATURATION: 97 % | SYSTOLIC BLOOD PRESSURE: 126 MMHG | BODY MASS INDEX: 22.5 KG/M2 | DIASTOLIC BLOOD PRESSURE: 74 MMHG | WEIGHT: 140 LBS | HEART RATE: 67 BPM

## 2019-09-25 DIAGNOSIS — F41.1 GENERALIZED ANXIETY DISORDER: ICD-10-CM

## 2019-09-25 DIAGNOSIS — I10 ESSENTIAL HYPERTENSION: Primary | Chronic | ICD-10-CM

## 2019-09-25 PROCEDURE — 99214 OFFICE O/P EST MOD 30 MIN: CPT | Mod: S$GLB,,, | Performed by: INTERNAL MEDICINE

## 2019-09-25 PROCEDURE — 99214 PR OFFICE/OUTPT VISIT, EST, LEVL IV, 30-39 MIN: ICD-10-PCS | Mod: S$GLB,,, | Performed by: INTERNAL MEDICINE

## 2019-09-25 PROCEDURE — 99999 PR PBB SHADOW E&M-EST. PATIENT-LVL III: CPT | Mod: PBBFAC,,, | Performed by: INTERNAL MEDICINE

## 2019-09-25 PROCEDURE — 99999 PR PBB SHADOW E&M-EST. PATIENT-LVL III: ICD-10-PCS | Mod: PBBFAC,,, | Performed by: INTERNAL MEDICINE

## 2019-09-25 RX ORDER — SERTRALINE HYDROCHLORIDE 25 MG/1
50 TABLET, FILM COATED ORAL DAILY
Qty: 180 TABLET | Refills: 1 | Status: SHIPPED | OUTPATIENT
Start: 2019-09-25 | End: 2019-09-30

## 2019-09-25 RX ORDER — HYDROCHLOROTHIAZIDE 12.5 MG/1
12.5 TABLET ORAL DAILY
Qty: 90 TABLET | Refills: 3 | Status: SHIPPED | OUTPATIENT
Start: 2019-09-25 | End: 2020-12-09

## 2019-09-25 RX ORDER — LOSARTAN POTASSIUM 50 MG/1
50 TABLET ORAL DAILY
Qty: 90 TABLET | Refills: 3 | Status: SHIPPED | OUTPATIENT
Start: 2019-09-25 | End: 2019-10-23 | Stop reason: SDUPTHER

## 2019-09-25 NOTE — PROGRESS NOTES
Subjective:       Patient ID: Cecilia Munguia is a 74 y.o. female.    Chief Complaint: Hypertension    Pt here for f/u HTN. She finds that her home readings are running high since changing from hctz to losartan. Denies cp/sob/ha/vision or neuro changes. Her readings here today are ok but she is interested in changing meds. She did not bring her BP cuff with her.     Her anxiety is better since taking zoloft. However, she would like to consider having an extra 25mg to take prn. We discussed how the medication works but she would still like to do this.     Review of Systems   Constitutional: Negative for unexpected weight change.   Eyes: Negative for visual disturbance.   Respiratory: Negative for shortness of breath.    Cardiovascular: Negative for chest pain.   Neurological: Negative for headaches.   Psychiatric/Behavioral: Negative for dysphoric mood. The patient is nervous/anxious.        Objective:      Physical Exam   Constitutional: She is oriented to person, place, and time. She appears well-developed and well-nourished.   Eyes: Pupils are equal, round, and reactive to light. EOM are normal.   Neck: Neck supple. No thyromegaly present.   Cardiovascular: Normal rate, regular rhythm and normal heart sounds.   Pulmonary/Chest: Effort normal and breath sounds normal.   Musculoskeletal: She exhibits no edema.   Lymphadenopathy:     She has no cervical adenopathy.   Neurological: She is alert and oriented to person, place, and time. No cranial nerve deficit.   Psychiatric: She has a normal mood and affect. Her behavior is normal.       Assessment:       1. Essential hypertension    2. Generalized anxiety disorder        Plan:       1. Decrease losartan to 50mg daily and add hctz 12.5mg daily  2. She will report via [facility] her BP readings in 2 weeks  3. Change zoloft to 25mg tablets daily and pt will have liberty to take an extra 25mg daily prn (max dose 50mg daily)

## 2019-09-29 ENCOUNTER — PATIENT OUTREACH (OUTPATIENT)
Dept: ADMINISTRATIVE | Facility: OTHER | Age: 74
End: 2019-09-29

## 2019-09-30 ENCOUNTER — TELEPHONE (OUTPATIENT)
Dept: INTERNAL MEDICINE | Facility: CLINIC | Age: 74
End: 2019-09-30

## 2019-09-30 RX ORDER — SERTRALINE HYDROCHLORIDE 50 MG/1
50 TABLET, FILM COATED ORAL DAILY
Qty: 90 TABLET | Refills: 3 | Status: SHIPPED | OUTPATIENT
Start: 2019-09-30 | End: 2020-11-05

## 2019-09-30 NOTE — TELEPHONE ENCOUNTER
----- Message from Parul Cole sent at 9/30/2019  9:16 AM CDT -----  Contact: EDUAR SEAY [[id number]]  Name of Who is Calling: [place] with [facility]       What is the request in detail: [place]  is calling in regards to sertraline (ZOLOFT) 25 MG tablet pharmacy is ask for a increase for the insurance to cover .Please contact to further discuss and advise.       Can the clinic reply by [facility]:       What Number to Call Back if not in [facility]: [phone]

## 2019-10-01 ENCOUNTER — OFFICE VISIT (OUTPATIENT)
Dept: OTOLARYNGOLOGY | Facility: CLINIC | Age: 74
End: 2019-10-01
Payer: COMMERCIAL

## 2019-10-01 VITALS — HEIGHT: 66 IN | WEIGHT: 139 LBS | BODY MASS INDEX: 22.34 KG/M2

## 2019-10-01 DIAGNOSIS — J34.2 NASAL SEPTAL DEVIATION: ICD-10-CM

## 2019-10-01 DIAGNOSIS — R42 VERTIGO: ICD-10-CM

## 2019-10-01 DIAGNOSIS — Z97.4 HEARING AID WORN: ICD-10-CM

## 2019-10-01 DIAGNOSIS — H90.3 SENSORINEURAL HEARING LOSS (SNHL) OF BOTH EARS: Primary | ICD-10-CM

## 2019-10-01 DIAGNOSIS — R04.0 EPISTAXIS: ICD-10-CM

## 2019-10-01 PROCEDURE — 99214 OFFICE O/P EST MOD 30 MIN: CPT | Mod: 25,S$GLB,, | Performed by: SPECIALIST

## 2019-10-01 PROCEDURE — 30901 CONTROL OF NOSEBLEED: CPT | Mod: LT,S$GLB,, | Performed by: SPECIALIST

## 2019-10-01 PROCEDURE — 30901 PR CTRL 2SEBLEED,ANTER,SIMPLE: ICD-10-PCS | Mod: LT,S$GLB,, | Performed by: SPECIALIST

## 2019-10-01 PROCEDURE — 99214 PR OFFICE/OUTPT VISIT, EST, LEVL IV, 30-39 MIN: ICD-10-PCS | Mod: 25,S$GLB,, | Performed by: SPECIALIST

## 2019-10-01 RX ORDER — AZELASTINE 1 MG/ML
SPRAY, METERED NASAL
COMMUNITY
Start: 2019-09-12 | End: 2021-04-29 | Stop reason: CLARIF

## 2019-10-01 NOTE — PROGRESS NOTES
Subjective:       Patient ID: Cecilia Munguia is a 74 y.o. female.    Chief Complaint: Sinus Problem and Follow-up    The patient is coming in for follow-up visit.  Overall, she has done much better, but she did have an episode of spontaneous epistaxis 2 days ago while writing in her car.  It stopped relatively quickly when she placed a rolled Kleenex into the left nostril.  He was significantly less quantity than in her previous bleeds.  She is concerned because she is going out of the country on [place] 28th.    Review of Systems   Constitutional: Positive for fatigue. Negative for activity change, appetite change, chills, fever and unexpected weight change.   HENT: Positive for congestion, nosebleeds, postnasal drip and rhinorrhea. Negative for ear discharge, ear pain, facial swelling, hearing loss, mouth sores, sinus pressure, sinus pain, sneezing, sore throat, tinnitus, trouble swallowing and voice change.    Eyes: Negative for photophobia, pain, discharge, redness, itching and visual disturbance.   Respiratory: Positive for cough. Negative for apnea, choking, shortness of breath and wheezing.    Cardiovascular: Negative for chest pain and palpitations.   Gastrointestinal: Negative for abdominal distention, abdominal pain, nausea and vomiting.   Musculoskeletal: Negative for arthralgias, myalgias, neck pain and neck stiffness.   Skin: Negative.  Negative for color change, pallor and rash.   Allergic/Immunologic: Negative for environmental allergies, food allergies and immunocompromised state.   Neurological: Positive for weakness (Weakness and muscular atrophy in both hands secondary underlying neuromuscular problems) and headaches. Negative for dizziness, facial asymmetry, speech difficulty, light-headedness and numbness.        Chronic inflammatory idiopathic polyneuropathy, last exacerbation 4 years ago   Hematological: Negative for adenopathy. Does not bruise/bleed easily.   Psychiatric/Behavioral: Negative  for confusion, decreased concentration and sleep disturbance.       Objective:      Physical Exam   Constitutional: She is oriented to person, place, and time. She appears well-developed and well-nourished. She is cooperative.   HENT:   Head: Normocephalic.   Right Ear: External ear and ear canal normal. Tympanic membrane is retracted.   Left Ear: External ear and ear canal normal. Tympanic membrane is retracted.   Nose: Mucosal edema (cyanotic, boggy inferior turbinates bilaterally), rhinorrhea (clear mucus bilaterally) and septal deviation (Deviated to the left with anterior septal vessel coursing in a super0-inferior direction) present.   Mouth/Throat: Uvula is midline, oropharynx is clear and moist and mucous membranes are normal. No oral lesions.   Eyes: Pupils are equal, round, and reactive to light. EOM and lids are normal. Right eye exhibits no discharge and no exudate. Left eye exhibits no discharge and no exudate. Right conjunctiva is injected. Left conjunctiva is injected.   Neck: Trachea normal and normal range of motion. No muscular tenderness present. No tracheal deviation present. No thyroid mass and no thyromegaly present.   Cardiovascular: Normal rate, regular rhythm, normal heart sounds and normal pulses.   Pulmonary/Chest: Effort normal and breath sounds normal. No stridor. She has no decreased breath sounds. She has no wheezes. She has no rhonchi. She has no rales.   Abdominal: Soft. Bowel sounds are normal. There is no tenderness.   Musculoskeletal: Normal range of motion.   Lymphadenopathy:        Head (right side): No submental, no submandibular, no preauricular, no posterior auricular and no occipital adenopathy present.        Head (left side): No submental, no submandibular, no preauricular, no posterior auricular and no occipital adenopathy present.     She has no cervical adenopathy.   Neurological: She is alert and oriented to person, place, and time. She has normal strength. No cranial  nerve deficit or sensory deficit. Gait normal.   Neuro otologic-no nystagmus, normal gait   Skin: Skin is warm and dry. No petechiae and no rash noted. No cyanosis. Nails show no clubbing.   Psychiatric: She has a normal mood and affect. Her speech is normal and behavior is normal. Judgment and thought content normal. Cognition and memory are normal.       Audiogram (2016)-bilaterals sensorineural hearing loss that is symmetrical    VNG (6/06/2019)-normal     Procedure-left anterior nasal cautery with silver nitrate was performed under direct visualization after 1st applying xylocaine and Rah-Synephrine as and aerosol and then as a topical pack for anesthesia.  The patient tolerated procedure well was discharged post procedure.    Assessment:       1. Epistaxis    2. Nasal septal deviation    3. Vertigo        Plan:       I will have the patient continue with the nasal moisturization protocol.  I will recheck her in 1 week.    she needs to undergo complete audiologic evaluation since it has been 3 years since her last audiogram.

## 2019-10-07 ENCOUNTER — PATIENT OUTREACH (OUTPATIENT)
Dept: ADMINISTRATIVE | Facility: OTHER | Age: 74
End: 2019-10-07

## 2019-10-08 ENCOUNTER — OFFICE VISIT (OUTPATIENT)
Dept: OTOLARYNGOLOGY | Facility: CLINIC | Age: 74
End: 2019-10-08
Payer: COMMERCIAL

## 2019-10-08 VITALS
HEIGHT: 66 IN | DIASTOLIC BLOOD PRESSURE: 83 MMHG | WEIGHT: 139 LBS | HEART RATE: 66 BPM | SYSTOLIC BLOOD PRESSURE: 117 MMHG | BODY MASS INDEX: 22.34 KG/M2

## 2019-10-08 DIAGNOSIS — J30.9 ALLERGIC RHINITIS, UNSPECIFIED SEASONALITY, UNSPECIFIED TRIGGER: ICD-10-CM

## 2019-10-08 DIAGNOSIS — R26.89 IMBALANCE: ICD-10-CM

## 2019-10-08 DIAGNOSIS — Z97.4 HEARING AID WORN: ICD-10-CM

## 2019-10-08 DIAGNOSIS — R04.0 EPISTAXIS: Primary | ICD-10-CM

## 2019-10-08 DIAGNOSIS — R27.0 ATAXIA: ICD-10-CM

## 2019-10-08 DIAGNOSIS — H90.3 SENSORINEURAL HEARING LOSS (SNHL) OF BOTH EARS: ICD-10-CM

## 2019-10-08 DIAGNOSIS — J34.2 NASAL SEPTAL DEVIATION: ICD-10-CM

## 2019-10-08 PROCEDURE — 99214 OFFICE O/P EST MOD 30 MIN: CPT | Mod: S$GLB,,, | Performed by: SPECIALIST

## 2019-10-08 PROCEDURE — 99214 PR OFFICE/OUTPT VISIT, EST, LEVL IV, 30-39 MIN: ICD-10-PCS | Mod: S$GLB,,, | Performed by: SPECIALIST

## 2019-10-08 NOTE — PROGRESS NOTES
Subjective:       Patient ID: Cecilia Munguia is a 74 y.o. female.    Chief Complaint: Sinus Problem and Follow-up    The patient is returning for follow-up visit.  There are multiple issues co 1  1.  She has had no further episodes of epistaxis.  She continues to get small amount of crusting in her nose.  2.  She is having some nasal congestion postnasal drip.  Nasal secretions are clear.  She is symptoms have minimal.  3.  She has been having daily episodes imbalance for the last 3 months.  She has a history of BPPV; however, this vertigo is of a different nature.  It occurs primarily when she the looks down as in working on a computer reading a book and then looks up or when she has tubes forward bends up.  It is imbalance and unsteadiness rather than any type of vertigo.  It is present every day.  She has had an evaluation by an outside ENT.  His CT scan showed some abnormality the letter having an MRI of the internal auditory canals, which was negative.  She had a VNG which was normal. She also was referred to a cardiologist for an evaluation which failed true reveal a cause for her balance issues  4.  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  She has not had a visit with the neurologist in some time.    Review of Systems   Constitutional: Positive for fatigue. Negative for activity change, appetite change, chills, fever and unexpected weight change.   HENT: Positive for congestion, postnasal drip, rhinorrhea, sinus pressure, sinus pain and sore throat. Negative for ear discharge, ear pain, facial swelling, hearing loss, mouth sores, nosebleeds, sneezing, tinnitus, trouble swallowing and voice change.    Eyes: Negative for photophobia, pain, discharge, redness, itching and visual disturbance.   Respiratory: Positive for cough. Negative for apnea, choking, shortness of breath and wheezing.    Cardiovascular: Negative for chest pain and palpitations.   Gastrointestinal: Negative for  abdominal distention, abdominal pain, nausea and vomiting.   Musculoskeletal: Positive for back pain and neck pain. Negative for arthralgias, myalgias and neck stiffness.   Skin: Negative.  Negative for color change, pallor and rash.   Allergic/Immunologic: Positive for environmental allergies. Negative for food allergies and immunocompromised state.   Neurological: Positive for weakness, light-headedness and headaches. Negative for dizziness, facial asymmetry, speech difficulty and numbness.   Hematological: Negative for adenopathy. Does not bruise/bleed easily.   Psychiatric/Behavioral: Negative for confusion, decreased concentration and sleep disturbance.       Objective:      Physical Exam   Constitutional: She is oriented to person, place, and time. She appears well-developed and well-nourished. She is cooperative.   HENT:   Head: Normocephalic.   Right Ear: External ear and ear canal normal. Tympanic membrane is retracted.   Left Ear: External ear and ear canal normal. Tympanic membrane is retracted.   Nose: Mucosal edema (cyanotic, boggy inferior turbinates bilaterally), rhinorrhea (clear mucus bilaterally) and septal deviation (To the left, shallow ulcer with scab in the anterior septum at cautery site, minimal crusting on right) present.   Mouth/Throat: Uvula is midline, oropharynx is clear and moist and mucous membranes are normal. No oral lesions.   Eyes: Pupils are equal, round, and reactive to light. EOM and lids are normal. Right eye exhibits no discharge and no exudate. Left eye exhibits no discharge and no exudate. Right conjunctiva is injected. Left conjunctiva is injected.   Neck: Trachea normal. No muscular tenderness present. Carotid bruit is not present. Decreased range of motion present. No tracheal deviation present. No thyroid mass and no thyromegaly present.   Cardiovascular: Normal rate, regular rhythm, normal heart sounds and normal pulses.   Pulmonary/Chest: Effort normal and breath  sounds normal. No stridor. She has no decreased breath sounds. She has no wheezes. She has no rhonchi. She has no rales.   Abdominal: Soft. Bowel sounds are normal. There is no tenderness.   Lymphadenopathy:        Head (right side): No submental, no submandibular, no preauricular, no posterior auricular and no occipital adenopathy present.        Head (left side): No submental, no submandibular, no preauricular, no posterior auricular and no occipital adenopathy present.     She has no cervical adenopathy.   Neurological: She is alert and oriented to person, place, and time. She has normal strength. No cranial nerve deficit or sensory deficit. Gait normal.   Neuro otologic-no nystagmus, wide-based gait, Romberg falls backwards, tandem Romberg falls to the right    [id number] for BPPV via bilaterally   Skin: Skin is warm and dry. No petechiae and no rash noted. No cyanosis. Nails show no clubbing.   Psychiatric: She has a normal mood and affect. Her speech is normal and behavior is normal. Judgment and thought content normal. Cognition and memory are normal.       Assessment:       1. Epistaxis    2. Imbalance    3. Ataxia    4. Allergic rhinitis, unspecified seasonality, unspecified trigger    5. Nasal septal deviation    6. Sensorineural hearing loss (SNHL) of both ears    7. Hearing aid worn        Plan:       I will have the patient contact her neurologist for a follow-up evaluation.  She will bring in copies of the MRI and CT reports that word done elsewhere.  She needs to have a repeat audiologic evaluation.  She will continue using the ointment to her nose 3-4 times daily for the next 2 weeks and twice daily there after.  She is leaving on October 28th for vacation to [place]  I will recheck her in 4 weeks when she returns from her vacation..

## 2019-10-23 DIAGNOSIS — I10 ESSENTIAL HYPERTENSION: Chronic | ICD-10-CM

## 2019-10-23 RX ORDER — LOSARTAN POTASSIUM 50 MG/1
50 TABLET ORAL DAILY
Qty: 90 TABLET | Refills: 3 | Status: SHIPPED | OUTPATIENT
Start: 2019-10-23 | End: 2020-10-29 | Stop reason: SDUPTHER

## 2019-10-23 NOTE — TELEPHONE ENCOUNTER
----- Message from Maricarmen Thomason sent at 10/22/2019  8:59 AM CDT -----  Contact: EDUAR SEAY [[id number]]  Can the clinic reply in [facility]: N       Please refill the medication(s) listed below. Please call the patient when the prescription(s) is ready for  at this phone number [phone]       Medication #1 losartan (COZAAR) 50 MG tablet        Preferred Pharmacy: [id number] - [place] - [address]

## 2019-11-08 RX ORDER — SERTRALINE HYDROCHLORIDE 25 MG/1
TABLET, FILM COATED ORAL
Qty: 90 TABLET | Refills: 1 | OUTPATIENT
Start: 2019-11-08

## 2019-11-12 ENCOUNTER — PATIENT OUTREACH (OUTPATIENT)
Dept: ADMINISTRATIVE | Facility: OTHER | Age: 74
End: 2019-11-12

## 2019-11-15 ENCOUNTER — OFFICE VISIT (OUTPATIENT)
Dept: OTOLARYNGOLOGY | Facility: CLINIC | Age: 74
End: 2019-11-15
Payer: COMMERCIAL

## 2019-11-15 VITALS
SYSTOLIC BLOOD PRESSURE: 115 MMHG | WEIGHT: 139 LBS | BODY MASS INDEX: 22.34 KG/M2 | HEART RATE: 70 BPM | DIASTOLIC BLOOD PRESSURE: 79 MMHG | HEIGHT: 66 IN

## 2019-11-15 DIAGNOSIS — J30.9 ALLERGIC RHINITIS, UNSPECIFIED SEASONALITY, UNSPECIFIED TRIGGER: ICD-10-CM

## 2019-11-15 DIAGNOSIS — Z97.4 HEARING AID WORN: ICD-10-CM

## 2019-11-15 DIAGNOSIS — H90.3 SENSORINEURAL HEARING LOSS (SNHL) OF BOTH EARS: ICD-10-CM

## 2019-11-15 DIAGNOSIS — R04.0 EPISTAXIS: Primary | ICD-10-CM

## 2019-11-15 DIAGNOSIS — J34.2 NASAL SEPTAL DEVIATION: ICD-10-CM

## 2019-11-15 DIAGNOSIS — R27.0 ATAXIA: ICD-10-CM

## 2019-11-15 PROCEDURE — 99213 OFFICE O/P EST LOW 20 MIN: CPT | Mod: S$GLB,,, | Performed by: SPECIALIST

## 2019-11-15 PROCEDURE — 99213 PR OFFICE/OUTPT VISIT, EST, LEVL III, 20-29 MIN: ICD-10-PCS | Mod: S$GLB,,, | Performed by: SPECIALIST

## 2019-11-15 RX ORDER — FLUOROURACIL 50 MG/G
CREAM TOPICAL
COMMUNITY
End: 2021-04-29 | Stop reason: CLARIF

## 2019-11-15 RX ORDER — PROMETHAZINE HYDROCHLORIDE AND DEXTROMETHORPHAN HYDROBROMIDE 6.25; 15 MG/5ML; MG/5ML
SYRUP ORAL
COMMUNITY
End: 2020-01-15

## 2019-11-15 RX ORDER — ALPRAZOLAM 0.5 MG/1
TABLET ORAL
COMMUNITY
End: 2020-01-15

## 2019-11-15 RX ORDER — OXYCODONE AND ACETAMINOPHEN 5; 325 MG/1; MG/1
TABLET ORAL
COMMUNITY
End: 2020-01-15

## 2019-11-15 RX ORDER — ALCLOMETASONE DIPROPIONATE 0.5 MG/G
CREAM TOPICAL
COMMUNITY
End: 2021-04-28

## 2019-11-15 RX ORDER — MECLIZINE HCL 12.5 MG 12.5 MG/1
TABLET ORAL
COMMUNITY
End: 2020-01-15

## 2019-11-15 RX ORDER — ECONAZOLE NITRATE 10 MG/G
1 CREAM TOPICAL 2 TIMES DAILY
Refills: 0 | COMMUNITY
Start: 2019-10-02 | End: 2021-04-29 | Stop reason: CLARIF

## 2019-11-15 RX ORDER — ESTRADIOL 0.1 MG/G
CREAM VAGINAL
COMMUNITY
End: 2020-01-15

## 2019-11-15 RX ORDER — OXYBUTYNIN CHLORIDE 10 MG/1
TABLET, EXTENDED RELEASE ORAL
COMMUNITY
End: 2020-01-15

## 2019-11-15 RX ORDER — ALCLOMETASONE DIPROPIONATE 0.5 MG/G
CREAM TOPICAL
Refills: 1 | COMMUNITY
Start: 2019-10-11 | End: 2020-01-15

## 2019-11-15 RX ORDER — AMLODIPINE BESYLATE 5 MG/1
TABLET ORAL
COMMUNITY
End: 2020-01-15

## 2019-11-15 NOTE — PROGRESS NOTES
Subjective:       Patient ID: Cecilia Munguia is a 74 y.o. female.    Chief Complaint: Sinus Problem and Follow-up    The patient is returning for follow-up visit.  She had 1 minor episode of epistaxis approximately 10 days following her last appointment.  She did go on a vacation to [place].  She had no problems while in ear with regard to bleeding or sinus issues.  Regarding her vertigo.  She has had 1 brief episode of vertigo the lasted less than 60 sec while she was in [place].  Otherwise she has been doing well.    Review of Systems   Constitutional: Positive for fatigue. Negative for activity change, appetite change, chills and fever.   HENT: Positive for congestion, ear pain, postnasal drip, rhinorrhea, sinus pressure, sinus pain and sore throat. Negative for ear discharge, facial swelling, hearing loss, mouth sores, sneezing, tinnitus, trouble swallowing and voice change.         Nasal crusting   Eyes: Negative for photophobia, pain, discharge, redness, itching and visual disturbance.   Respiratory: Positive for cough. Negative for apnea, choking, shortness of breath and wheezing.    Cardiovascular: Negative for chest pain and palpitations.   Gastrointestinal: Negative for abdominal distention, abdominal pain, nausea and vomiting.   Musculoskeletal: Positive for arthralgias and joint swelling. Negative for myalgias, neck pain and neck stiffness.   Skin: Negative.  Negative for color change, pallor and rash.   Allergic/Immunologic: Positive for environmental allergies. Negative for food allergies and immunocompromised state.   Neurological: Positive for dizziness, light-headedness and headaches. Negative for speech difficulty and weakness.   Hematological: Negative for adenopathy. Does not bruise/bleed easily.   Psychiatric/Behavioral: Negative for agitation, confusion, decreased concentration and sleep disturbance.       Objective:      Physical Exam   Constitutional: She is oriented to person, place, and  time. She appears well-developed and well-nourished. She is cooperative.   HENT:   Head: Normocephalic.   Right Ear: External ear and ear canal normal. Tympanic membrane is retracted.   Left Ear: External ear and ear canal normal. Tympanic membrane is retracted.   Nose: Mucosal edema (cyanotic, boggy inferior turbinates bilaterally), rhinorrhea (clear mucus bilaterally with yellow crusting in both nasal passages on the inferior turbinates and floor the nasal passage) and septal deviation (To the left) present.   Mouth/Throat: Uvula is midline, oropharynx is clear and moist and mucous membranes are normal. No oral lesions.   Eyes: Pupils are equal, round, and reactive to light. EOM and lids are normal. Right eye exhibits no discharge and no exudate. Left eye exhibits no discharge and no exudate. Right conjunctiva is injected. Left conjunctiva is injected.   Neck: Trachea normal and normal range of motion. No muscular tenderness present. No tracheal deviation present. No thyroid mass and no thyromegaly present.   Cardiovascular: Normal rate, regular rhythm, normal heart sounds and normal pulses.   Pulmonary/Chest: Effort normal and breath sounds normal. No stridor. She has no decreased breath sounds. She has no wheezes. She has no rhonchi. She has no rales.   Abdominal: Soft. Bowel sounds are normal. There is no tenderness.   Musculoskeletal: Normal range of motion.   Lymphadenopathy:        Head (right side): No submental, no submandibular, no preauricular, no posterior auricular and no occipital adenopathy present.        Head (left side): No submental, no submandibular, no preauricular, no posterior auricular and no occipital adenopathy present.     She has no cervical adenopathy.   Neurological: She is alert and oriented to person, place, and time. She has normal strength. No cranial nerve deficit or sensory deficit. Gait normal.   Skin: Skin is warm and dry. No petechiae and no rash noted. No cyanosis. Nails show  no clubbing.   Psychiatric: She has a normal mood and affect. Her speech is normal and behavior is normal. Judgment and thought content normal. Cognition and memory are normal.       Assessment:       1. Epistaxis    2. Nasal septal deviation    3. Allergic rhinitis, unspecified seasonality, unspecified trigger    4. Sensorineural hearing loss (SNHL) of both ears    5. Hearing aid worn    6. Ataxia        Plan:       I will have the patient intensify the moisturization protocol.  She will use ointment a minimum twice daily in her nose and up to 4 times daily when the heat is on inside the house.  She will continue using the nasal gel spray every 2-3 hours while awake.  I will recheck her in 4 weeks.  I did provide her with a copy a of her [facility] report upon her request.

## 2020-01-07 ENCOUNTER — TELEPHONE (OUTPATIENT)
Dept: PRIMARY CARE CLINIC | Facility: CLINIC | Age: 75
End: 2020-01-07

## 2020-01-07 NOTE — TELEPHONE ENCOUNTER
Spoke to Ms. Munguia and patient states that she had a fall and was seen by Ortho which rec she takes Aleve and patient  would like to know if it is okay since she has a history of stomach ulcer and Gerd.

## 2020-01-07 NOTE — TELEPHONE ENCOUNTER
----- Message from Elda Argueta sent at 1/7/2020 10:14 AM CST -----  Contact: EDUAR SEAY [[id number]]  Contact: EDUAR SEAY [[id number]]    What is the request in detail:   Requesting a call in regards to if she is able to take antiinflammatory medication for her knee pain     Can the clinic reply by [facility]:  No      What Number to Call Back if not in [facility]:   [phone]

## 2020-01-07 NOTE — TELEPHONE ENCOUNTER
Spoke to Ms. Nilda and inform her that One Aleve twice daily with food would be safe for a week or 2.  Patient states understanding with no further questions or concerns.

## 2020-01-15 ENCOUNTER — OFFICE VISIT (OUTPATIENT)
Dept: INTERNAL MEDICINE | Facility: CLINIC | Age: 75
End: 2020-01-15
Payer: MEDICARE

## 2020-01-15 VITALS
SYSTOLIC BLOOD PRESSURE: 126 MMHG | DIASTOLIC BLOOD PRESSURE: 78 MMHG | HEART RATE: 57 BPM | HEIGHT: 66 IN | OXYGEN SATURATION: 99 % | WEIGHT: 144.81 LBS | BODY MASS INDEX: 23.27 KG/M2

## 2020-01-15 DIAGNOSIS — F41.1 GENERALIZED ANXIETY DISORDER: ICD-10-CM

## 2020-01-15 DIAGNOSIS — I10 ESSENTIAL HYPERTENSION: Chronic | ICD-10-CM

## 2020-01-15 DIAGNOSIS — G61.81 CHRONIC INFLAMMATORY DEMYELINATING POLYNEUROPATHY: Primary | Chronic | ICD-10-CM

## 2020-01-15 DIAGNOSIS — M85.89 OSTEOPENIA OF MULTIPLE SITES: ICD-10-CM

## 2020-01-15 DIAGNOSIS — K21.9 GASTROESOPHAGEAL REFLUX DISEASE WITHOUT ESOPHAGITIS: ICD-10-CM

## 2020-01-15 DIAGNOSIS — E78.2 MIXED HYPERLIPIDEMIA: Chronic | ICD-10-CM

## 2020-01-15 PROCEDURE — 99999 PR PBB SHADOW E&M-EST. PATIENT-LVL III: ICD-10-PCS | Mod: PBBFAC,,, | Performed by: INTERNAL MEDICINE

## 2020-01-15 PROCEDURE — 1159F PR MEDICATION LIST DOCUMENTED IN MEDICAL RECORD: ICD-10-PCS | Mod: S$GLB,,, | Performed by: INTERNAL MEDICINE

## 2020-01-15 PROCEDURE — 1159F MED LIST DOCD IN RCRD: CPT | Mod: S$GLB,,, | Performed by: INTERNAL MEDICINE

## 2020-01-15 PROCEDURE — 99214 OFFICE O/P EST MOD 30 MIN: CPT | Mod: S$GLB,,, | Performed by: INTERNAL MEDICINE

## 2020-01-15 PROCEDURE — 1125F AMNT PAIN NOTED PAIN PRSNT: CPT | Mod: S$GLB,,, | Performed by: INTERNAL MEDICINE

## 2020-01-15 PROCEDURE — 1125F PR PAIN SEVERITY QUANTIFIED, PAIN PRESENT: ICD-10-PCS | Mod: S$GLB,,, | Performed by: INTERNAL MEDICINE

## 2020-01-15 PROCEDURE — 99999 PR PBB SHADOW E&M-EST. PATIENT-LVL III: CPT | Mod: PBBFAC,,, | Performed by: INTERNAL MEDICINE

## 2020-01-15 PROCEDURE — 99214 PR OFFICE/OUTPT VISIT, EST, LEVL IV, 30-39 MIN: ICD-10-PCS | Mod: S$GLB,,, | Performed by: INTERNAL MEDICINE

## 2020-01-15 RX ORDER — MELOXICAM 15 MG/1
TABLET ORAL
COMMUNITY
Start: 2020-01-09 | End: 2020-12-04 | Stop reason: CLARIF

## 2020-01-15 NOTE — PROGRESS NOTES
Subjective:       Patient ID: Cecilia Munguia is a 74 y.o. female.    Chief Complaint: Annual Exam    Pt here for f/u. She fell after tripping on a step going to back yard at [place]. This resulted in R knee pain so saw ortho eventually who Rx mobic which she has taken daily for the last week with some improvement. She has MRI planned and probably surgery for torn cartilage. She is normally active and able to achieve >4 METS (exercises at gym on elliptical) without cp/sob/angina. She has tolerated anesthesia in the past without known major complication.     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 She has atrophy in muscles of hands and feet due to this. She sees neuro regularly. She has had intermittent exacerbations and required IVIG which helped tremendously. She does not take meds for this otherwise. She does avoid vaccinations in fear of GBS or worsening of symptoms.     She has GERD that is well controlled with prilosec otc prn. No dysphagia, wt loss, changes in BM, abd pain, n/v.     Pt's BP is well controlled. Tolerating meds well. Pt denies cp/sob/ha/vision or neuro changes. Checking at home and is well controlled.     She has mild HLD but has opted not to use statins due to CIDP. She has spoken with her neuro about this who also recommends sticking with diet/lifestyle interventions for now.      Her anxiety is well controlled on zoloft. She takes an extra 25mg prn. No SI/HI.     She has chronic dizziness although it is less in the last couple of months. She had exhaustive ENT and cardiology eval but no clear etiology found. This is stable to improved.     She has mild osteopenia on DEXA 3/2018. On ca/d.     Anxiety   Patient reports no chest pain, dizziness, palpitations or shortness of breath.         Review of Systems   Constitutional: Negative for fatigue, fever and unexpected weight change.   HENT: Negative for  congestion and sore throat.    Eyes: Negative for visual disturbance.   Respiratory: Negative for shortness of breath.    Cardiovascular: Negative for chest pain, palpitations and leg swelling.   Gastrointestinal: Negative for abdominal pain, blood in stool, constipation and diarrhea.   Genitourinary: Negative for dysuria.   Musculoskeletal: Negative for arthralgias.   Skin: Negative for rash.   Neurological: Negative for dizziness, syncope and headaches.   Hematological: Negative for adenopathy.   Psychiatric/Behavioral: Negative for dysphoric mood.       Objective:      Physical Exam   Constitutional: She is oriented to person, place, and time. She appears well-developed and well-nourished.   HENT:   Head: Normocephalic.   Right Ear: Tympanic membrane, external ear and ear canal normal.   Left Ear: Tympanic membrane, external ear and ear canal normal.   Nose: Nose normal.   Mouth/Throat: Uvula is midline and oropharynx is clear and moist.   Eyes: Pupils are equal, round, and reactive to light. Conjunctivae, EOM and lids are normal. Right conjunctiva is not injected. Left conjunctiva is not injected.   Neck: Neck supple. No JVD present. Carotid bruit is not present. No thyroid mass and no thyromegaly present.   Cardiovascular: Normal rate, regular rhythm, S1 normal, S2 normal and intact distal pulses. PMI is not displaced.   No murmur heard.  Pulses:       Posterior tibial pulses are 2+ on the right side, and 2+ on the left side.   Pulmonary/Chest: Effort normal and breath sounds normal. She has no wheezes. She has no rhonchi. She has no rales.   Abdominal: Soft. Bowel sounds are normal. She exhibits no distension and no abdominal bruit. There is no hepatosplenomegaly. There is no tenderness.   Musculoskeletal: She exhibits no edema.   Atrophy in muscles of hands. Cyst L dorsal wrist   Lymphadenopathy:        Head (right side): No preauricular and no posterior auricular adenopathy present.        Head (left side):  No preauricular and no posterior auricular adenopathy present.     She has no cervical adenopathy.     She has no axillary adenopathy.   Neurological: She is alert and oriented to person, place, and time. She has normal strength and normal reflexes. No cranial nerve deficit or sensory deficit.   Skin: Skin is warm. No rash noted.   Psychiatric: She has a normal mood and affect. Her speech is normal and behavior is normal. Judgment and thought content normal.       Assessment:       1. Chronic inflammatory demyelinating polyneuropathy    2. Generalized anxiety disorder    3. Essential hypertension    4. Mixed hyperlipidemia    5. Gastroesophageal reflux disease without esophagitis    6. Osteopenia of multiple sites        Plan:       1. Appropriate labs; prior EKG reviewed    2. Home BP monitoring   3. Keep f/u with neuro  4. Pt is ok to proceed with planned knee procedure as RCRI is 3.9% with 0 risk factors; chronic diseases are stable and well compensated

## 2020-01-17 ENCOUNTER — PATIENT MESSAGE (OUTPATIENT)
Dept: INTERNAL MEDICINE | Facility: CLINIC | Age: 75
End: 2020-01-17

## 2020-01-17 ENCOUNTER — LAB VISIT (OUTPATIENT)
Dept: LAB | Facility: OTHER | Age: 75
End: 2020-01-17
Attending: INTERNAL MEDICINE
Payer: COMMERCIAL

## 2020-01-17 DIAGNOSIS — I10 ESSENTIAL HYPERTENSION: Chronic | ICD-10-CM

## 2020-01-17 DIAGNOSIS — E78.2 MIXED HYPERLIPIDEMIA: Chronic | ICD-10-CM

## 2020-01-17 LAB
ALBUMIN SERPL BCP-MCNC: 3.6 G/DL (ref 3.5–5.2)
ALP SERPL-CCNC: 94 U/L (ref 55–135)
ALT SERPL W/O P-5'-P-CCNC: 12 U/L (ref 10–44)
ANION GAP SERPL CALC-SCNC: 11 MMOL/L (ref 8–16)
AST SERPL-CCNC: 16 U/L (ref 10–40)
BASOPHILS # BLD AUTO: 0.02 K/UL (ref 0–0.2)
BASOPHILS NFR BLD: 0.5 % (ref 0–1.9)
BILIRUB SERPL-MCNC: 0.6 MG/DL (ref 0.1–1)
BUN SERPL-MCNC: 12 MG/DL (ref 8–23)
CALCIUM SERPL-MCNC: 9.1 MG/DL (ref 8.7–10.5)
CHLORIDE SERPL-SCNC: 105 MMOL/L (ref 95–110)
CHOLEST SERPL-MCNC: 234 MG/DL (ref 120–199)
CHOLEST/HDLC SERPL: 2.7 {RATIO} (ref 2–5)
CO2 SERPL-SCNC: 29 MMOL/L (ref 23–29)
CREAT SERPL-MCNC: 0.7 MG/DL (ref 0.5–1.4)
DIFFERENTIAL METHOD: ABNORMAL
EOSINOPHIL # BLD AUTO: 0.2 K/UL (ref 0–0.5)
EOSINOPHIL NFR BLD: 5.5 % (ref 0–8)
ERYTHROCYTE [DISTWIDTH] IN BLOOD BY AUTOMATED COUNT: 13.2 % (ref 11.5–14.5)
EST. GFR  (AFRICAN AMERICAN): >60 ML/MIN/1.73 M^2
EST. GFR  (NON AFRICAN AMERICAN): >60 ML/MIN/1.73 M^2
GLUCOSE SERPL-MCNC: 90 MG/DL (ref 70–110)
HCT VFR BLD AUTO: 42.3 % (ref 37–48.5)
HDLC SERPL-MCNC: 87 MG/DL (ref 40–75)
HDLC SERPL: 37.2 % (ref 20–50)
HGB BLD-MCNC: 13.5 G/DL (ref 12–16)
IMM GRANULOCYTES # BLD AUTO: 0 K/UL (ref 0–0.04)
IMM GRANULOCYTES NFR BLD AUTO: 0 % (ref 0–0.5)
LDLC SERPL CALC-MCNC: 129.4 MG/DL (ref 63–159)
LYMPHOCYTES # BLD AUTO: 1.4 K/UL (ref 1–4.8)
LYMPHOCYTES NFR BLD: 35.4 % (ref 18–48)
MCH RBC QN AUTO: 30 PG (ref 27–31)
MCHC RBC AUTO-ENTMCNC: 31.9 G/DL (ref 32–36)
MCV RBC AUTO: 94 FL (ref 82–98)
MONOCYTES # BLD AUTO: 0.3 K/UL (ref 0.3–1)
MONOCYTES NFR BLD: 8.3 % (ref 4–15)
NEUTROPHILS # BLD AUTO: 2 K/UL (ref 1.8–7.7)
NEUTROPHILS NFR BLD: 50.3 % (ref 38–73)
NONHDLC SERPL-MCNC: 147 MG/DL
NRBC BLD-RTO: 0 /100 WBC
PLATELET # BLD AUTO: 257 K/UL (ref 150–350)
PMV BLD AUTO: 8.8 FL (ref 9.2–12.9)
POTASSIUM SERPL-SCNC: 4.1 MMOL/L (ref 3.5–5.1)
PROT SERPL-MCNC: 6.3 G/DL (ref 6–8.4)
RBC # BLD AUTO: 4.5 M/UL (ref 4–5.4)
SODIUM SERPL-SCNC: 145 MMOL/L (ref 136–145)
TRIGL SERPL-MCNC: 88 MG/DL (ref 30–150)
WBC # BLD AUTO: 3.98 K/UL (ref 3.9–12.7)

## 2020-01-17 PROCEDURE — 80061 LIPID PANEL: CPT

## 2020-01-17 PROCEDURE — 85025 COMPLETE CBC W/AUTO DIFF WBC: CPT

## 2020-01-17 PROCEDURE — 36415 COLL VENOUS BLD VENIPUNCTURE: CPT

## 2020-01-17 PROCEDURE — 80053 COMPREHEN METABOLIC PANEL: CPT

## 2020-01-23 ENCOUNTER — TELEPHONE (OUTPATIENT)
Dept: INTERNAL MEDICINE | Facility: CLINIC | Age: 75
End: 2020-01-23

## 2020-01-23 NOTE — TELEPHONE ENCOUNTER
----- Message from Itzel Goncalves sent at 1/22/2020  3:16 PM CST -----  Contact: Nisha ROB @ [facility]  Type: Patient Call Back    Who called:Nisha Campo    What is the request in detail: calling to share her contact information    Can the clinic reply by [facility]?NO    Would the patient rather a call back or a response via [facility]? call    Best call back number: ext [phone]

## 2020-01-31 NOTE — TELEPHONE ENCOUNTER
----- Message from Fredo Perera sent at 1/22/2018  2:28 PM CST -----  Contact: Cecilia Munguia  X_  1st Request  _  2nd Request  _  3rd Request        Who: Cecilia Munguia    Why: Patient states she missed two phone calls and would like to know what they were about    What Number to Call Back: [phone]    When to Expect a call back: (Within 24 hours)    Please return the call at earliest convenience. Thanks!                           No

## 2020-03-05 ENCOUNTER — TELEPHONE (OUTPATIENT)
Dept: OTOLARYNGOLOGY | Facility: CLINIC | Age: 75
End: 2020-03-05

## 2020-03-05 NOTE — TELEPHONE ENCOUNTER
Returned pt call. Scheduled pt an appointment for 2/13/20 as the first available per pt request.       ----- Message from Dilma Fonseca sent at 3/5/2020  2:02 PM CST -----  Contact: EDUAR SEAY [[id number]]  Name of Who is Calling: EDUAR SEAY [[id number]]      What is the request in detail: Patient is requesting to be seen today for ear cleaning and ringing in the ears. Please call     Can the clinic reply by [facility]: no    [facility]: [phone]

## 2020-03-11 ENCOUNTER — PATIENT OUTREACH (OUTPATIENT)
Dept: ADMINISTRATIVE | Facility: OTHER | Age: 75
End: 2020-03-11

## 2020-03-12 NOTE — PROGRESS NOTES
Chart reviewed.   Immunizations: Triggered Imm Registry     Orders placed: n/a  Upcoming appts to satisfy AMARJIT topics: n/a

## 2020-04-30 ENCOUNTER — TELEPHONE (OUTPATIENT)
Dept: INTERNAL MEDICINE | Facility: CLINIC | Age: 75
End: 2020-04-30

## 2020-04-30 NOTE — TELEPHONE ENCOUNTER
Patient called stating she's about to have a grand baby that will be born in July and she wants to know if it is safe for her to get the TDAP vaccine considering her medical issues. Please advise

## 2020-04-30 NOTE — TELEPHONE ENCOUNTER
----- Message from Maricarmen Thomason sent at 4/30/2020 10:13 AM CDT -----  Contact: EDUAR SEAY [[id number]]  Name of Who is Calling: EDUAR SEAY [[id number]]      What is the request in detail: Pt is calling to speak to staff in regards to questions about a vaccine for TDAP .... Please call to further assist .       Can the clinic reply by [facility]: N       [facility]: [phone]

## 2020-04-30 NOTE — TELEPHONE ENCOUNTER
I was able to speak with the patient and I discussed the recommendations below with them. The patient verbalized understanding and had no further questions or concerns.

## 2020-06-19 ENCOUNTER — OFFICE VISIT (OUTPATIENT)
Dept: OTOLARYNGOLOGY | Facility: CLINIC | Age: 75
End: 2020-06-19
Payer: MEDICARE

## 2020-06-19 ENCOUNTER — NURSE TRIAGE (OUTPATIENT)
Dept: ADMINISTRATIVE | Facility: CLINIC | Age: 75
End: 2020-06-19

## 2020-06-19 VITALS — TEMPERATURE: 97 F | SYSTOLIC BLOOD PRESSURE: 128 MMHG | DIASTOLIC BLOOD PRESSURE: 79 MMHG | HEART RATE: 62 BPM

## 2020-06-19 DIAGNOSIS — Z97.4 HEARING AID WORN: ICD-10-CM

## 2020-06-19 DIAGNOSIS — J34.2 NASAL SEPTAL DEVIATION: ICD-10-CM

## 2020-06-19 DIAGNOSIS — R04.0 EPISTAXIS: Primary | ICD-10-CM

## 2020-06-19 DIAGNOSIS — J30.9 ALLERGIC RHINITIS, UNSPECIFIED SEASONALITY, UNSPECIFIED TRIGGER: ICD-10-CM

## 2020-06-19 DIAGNOSIS — H90.3 SENSORINEURAL HEARING LOSS (SNHL) OF BOTH EARS: ICD-10-CM

## 2020-06-19 PROCEDURE — 30905 PR CTRL 2SEBLEED,POST,W/PACKS &/OR CAUT: ICD-10-PCS | Mod: S$GLB,,, | Performed by: SPECIALIST

## 2020-06-19 PROCEDURE — 30905 CONTROL OF NOSEBLEED: CPT | Mod: S$GLB,,, | Performed by: SPECIALIST

## 2020-06-19 PROCEDURE — 99213 PR OFFICE/OUTPT VISIT, EST, LEVL III, 20-29 MIN: ICD-10-PCS | Mod: 25,S$GLB,, | Performed by: SPECIALIST

## 2020-06-19 PROCEDURE — 99213 OFFICE O/P EST LOW 20 MIN: CPT | Mod: 25,S$GLB,, | Performed by: SPECIALIST

## 2020-06-19 NOTE — TELEPHONE ENCOUNTER
Recurrent nose bleed x 3 days. Pt states BP has been well controlled. Does report nose has been dry. Pt states nose bleed occurred at 1000, bleeding stopped after applying direct pressure for 15 min. Pt then got in shower and nose bleed started again. Pt admits she continues to taste blood. Advised pt per protocol to lean forward and hold pressure. Will attempt to contact Dr. Franco's office.  is with pt.     Pt afraid to go to ENT (Dr. Franco) doctor because of virus. Pt had same problem last year and Dr. Franco had to cauterize. Pt was instructed per protocol and reinforced the instructions that Dr. Franco told her to do for nose bleeds with gauze and bacitracin.     Advised pt to go to  if nose bleed does not stop. Pt verbalized understanding.     Reason for Disposition   Bleeding recurs 3 or more times in 24 hours despite direct pressure    Additional Information   Negative: Fainted (passed out), or too weak to stand following large blood loss   Negative: Sounds like a life-threatening emergency to the triager   Negative: Nosebleed followed nose injury   Negative: Bleeding present > 30 minutes and using correct method of direct pressure   Negative: Bleeding now and second call after being instructed in correct technique of direct pressure   Negative: Lightheadedness or dizziness   Negative: Pale skin (pallor) of new onset or worsening   Negative: Has nasal packing (inserted by health care provider to control bleeding) and now has new rash   Negative: Has nasal packing and now has bleeding around the packing (Exception: few drops or ooze)   Negative: Patient sounds very sick or weak to the triager   Negative: Large amount of blood has been lost (e.g., one cup)    Protocols used: NOSEBLEED-A-OH

## 2020-06-19 NOTE — PROGRESS NOTES
Subjective:       Patient ID: Cecilia Munguia is a 74 y.o. female.    Chief Complaint: nose bleed    HPI   The patient has been having recurring episodes of left epistaxis for the last 3 days.  They do occur spontaneously or with trauma.  She has had epistaxis in the past.  Her nose was cauterized a year ago.  The problem has just arisen recently.  She is not having headaches.  She does not have bruising or other bleeding diatheses.    Review of Systems   Constitutional: Positive for fatigue. Negative for activity change, appetite change, chills and fever.   HENT: Positive for congestion, ear pain, postnasal drip, rhinorrhea, sinus pressure, sinus pain and sore throat. Negative for ear discharge, facial swelling, hearing loss, mouth sores, sneezing, tinnitus, trouble swallowing and voice change.         Nasal crusting   Eyes: Negative for photophobia, pain, discharge, redness, itching and visual disturbance.   Respiratory: Positive for cough. Negative for apnea, choking, shortness of breath and wheezing.    Cardiovascular: Negative for chest pain and palpitations.   Gastrointestinal: Negative for abdominal distention, abdominal pain, nausea and vomiting.   Musculoskeletal: Positive for arthralgias and joint swelling. Negative for myalgias, neck pain and neck stiffness.   Skin: Negative.  Negative for color change, pallor and rash.   Allergic/Immunologic: Positive for environmental allergies. Negative for food allergies and immunocompromised state.   Neurological: Positive for dizziness, light-headedness and headaches. Negative for speech difficulty and weakness.   Hematological: Negative for adenopathy. Does not bruise/bleed easily.   Psychiatric/Behavioral: Negative for agitation, confusion, decreased concentration and sleep disturbance.       Objective:      Physical Exam  Constitutional:       Appearance: She is well-developed.   HENT:      Head: Normocephalic.      Right Ear: Ear canal and external ear normal.  Tympanic membrane is retracted.      Left Ear: Ear canal and external ear normal. Tympanic membrane is retracted.      Nose: Septal deviation (To the left), mucosal edema (cyanotic, boggy inferior turbinates bilaterally) and rhinorrhea (clear mucus bilaterally with yellow crusting in both nasal passages on the inferior turbinates and floor the nasal passage) present.      Mouth/Throat:      Mouth: No oral lesions.      Pharynx: Uvula midline.   Eyes:      General: Lids are normal.         Right eye: No discharge.         Left eye: No discharge.      Conjunctiva/sclera:      Right eye: Right conjunctiva is injected. No exudate.     Left eye: Left conjunctiva is injected. No exudate.     Pupils: Pupils are equal, round, and reactive to light.   Neck:      Musculoskeletal: Normal range of motion. No muscular tenderness.      Thyroid: No thyroid mass or thyromegaly.      Trachea: Trachea normal. No tracheal deviation.   Cardiovascular:      Rate and Rhythm: Normal rate and regular rhythm.      Pulses: Normal pulses.      Heart sounds: Normal heart sounds.   Pulmonary:      Effort: Pulmonary effort is normal.      Breath sounds: Normal breath sounds. No stridor. No decreased breath sounds, wheezing, rhonchi or rales.   Abdominal:      General: Bowel sounds are normal.      Palpations: Abdomen is soft.      Tenderness: There is no abdominal tenderness.   Musculoskeletal: Normal range of motion.   Lymphadenopathy:      Head:      Right side of head: No submental, submandibular, preauricular, posterior auricular or occipital adenopathy.      Left side of head: No submental, submandibular, preauricular, posterior auricular or occipital adenopathy.      Cervical: No cervical adenopathy.   Skin:     General: Skin is warm and dry.      Findings: No petechiae or rash.      Nails: There is no clubbing.     Neurological:      Mental Status: She is alert and oriented to person, place, and time.      Cranial Nerves: No cranial nerve  deficit.      Sensory: No sensory deficit.      Gait: Gait normal.   Psychiatric:         Speech: Speech normal.         Behavior: Behavior normal. Behavior is cooperative.         Thought Content: Thought content normal.         Judgment: Judgment normal.         Preop diagnosis:  Left epistaxis, nasal septum deviated to the left  Postop diagnosis:  Same  Anesthesia:  Topical 2% xylocaine and 0.5% Rah-Synephrine  Procedure:  Clotted blood was suctioned from the left nasal passage, which was then packed with the sloppy wet cotton soaked and xylocaine and Rah-Synephrine.  After adequate time for topical anesthesia and hemostasis the cotton pack was removed and then nasal septal ulcerated area was cauterized under direct vision with silver nitrate.  It was packed with plain cotton for 10 min and reexamined.  There was no further bleeding.  Patient was discharged in stable and good condition    Assessment:       1. Epistaxis    2. Nasal septal deviation    3. Sensorineural hearing loss (SNHL) of both ears    4. Allergic rhinitis, unspecified seasonality, unspecified trigger    5. Hearing aid worn        Plan:       I will have the patient resume using the nasal moisturization protocol within ointment place 4 times daily.  I will recheck her in 2 weeks.  She is to refrain from blowing her nose.

## 2020-07-06 ENCOUNTER — PATIENT OUTREACH (OUTPATIENT)
Dept: ADMINISTRATIVE | Facility: OTHER | Age: 75
End: 2020-07-06

## 2020-07-07 ENCOUNTER — OFFICE VISIT (OUTPATIENT)
Dept: OTOLARYNGOLOGY | Facility: CLINIC | Age: 75
End: 2020-07-07
Payer: MEDICARE

## 2020-07-07 VITALS — TEMPERATURE: 98 F | HEIGHT: 66 IN | WEIGHT: 142 LBS | BODY MASS INDEX: 22.82 KG/M2

## 2020-07-07 DIAGNOSIS — J30.9 ALLERGIC RHINITIS, UNSPECIFIED SEASONALITY, UNSPECIFIED TRIGGER: ICD-10-CM

## 2020-07-07 DIAGNOSIS — Z97.4 HEARING AID WORN: ICD-10-CM

## 2020-07-07 DIAGNOSIS — H90.3 SENSORINEURAL HEARING LOSS (SNHL) OF BOTH EARS: ICD-10-CM

## 2020-07-07 DIAGNOSIS — H61.23 BILATERAL IMPACTED CERUMEN: ICD-10-CM

## 2020-07-07 DIAGNOSIS — J34.2 NASAL SEPTAL DEVIATION: ICD-10-CM

## 2020-07-07 DIAGNOSIS — R04.0 EPISTAXIS: Primary | ICD-10-CM

## 2020-07-07 PROCEDURE — 99214 PR OFFICE/OUTPT VISIT, EST, LEVL IV, 30-39 MIN: ICD-10-PCS | Mod: 25,S$GLB,, | Performed by: SPECIALIST

## 2020-07-07 PROCEDURE — 99214 OFFICE O/P EST MOD 30 MIN: CPT | Mod: 25,S$GLB,, | Performed by: SPECIALIST

## 2020-07-07 PROCEDURE — 69210 EAR CERUMEN REMOVAL: ICD-10-PCS | Mod: S$GLB,,, | Performed by: SPECIALIST

## 2020-07-07 PROCEDURE — 69210 REMOVE IMPACTED EAR WAX UNI: CPT | Mod: S$GLB,,, | Performed by: SPECIALIST

## 2020-07-07 NOTE — PROGRESS NOTES
Subjective:       Patient ID: Cecilia Munguia is a 74 y.o. female.    Chief Complaint: Follow-up    Follow-up  Associated symptoms include arthralgias, congestion, coughing, fatigue, headaches, joint swelling and a sore throat. Pertinent negatives include no abdominal pain, chest pain, chills, fever, myalgias, nausea, neck pain, rash, vomiting or weakness.      The patient has been having recurring episodes of left epistaxis for the last 3 days.  They do occur spontaneously or with trauma.  She has had epistaxis in the past.  Her nose was cauterized a year ago.  The problem has just arisen recently.  She is not having headaches.  She does not have bruising or other bleeding diatheses.    Review of Systems   Constitutional: Positive for fatigue. Negative for activity change, appetite change, chills and fever.   HENT: Positive for congestion, ear pain, postnasal drip, rhinorrhea, sinus pressure, sinus pain and sore throat. Negative for ear discharge, facial swelling, hearing loss, mouth sores, sneezing, tinnitus, trouble swallowing and voice change.         Nasal crusting   Eyes: Negative for photophobia, pain, discharge, redness, itching and visual disturbance.   Respiratory: Positive for cough. Negative for apnea, choking, shortness of breath and wheezing.    Cardiovascular: Negative for chest pain and palpitations.   Gastrointestinal: Negative for abdominal distention, abdominal pain, nausea and vomiting.   Musculoskeletal: Positive for arthralgias and joint swelling. Negative for myalgias, neck pain and neck stiffness.   Skin: Negative.  Negative for color change, pallor and rash.   Allergic/Immunologic: Positive for environmental allergies. Negative for food allergies and immunocompromised state.   Neurological: Positive for dizziness, light-headedness and headaches. Negative for speech difficulty and weakness.   Hematological: Negative for adenopathy. Does not bruise/bleed easily.   Psychiatric/Behavioral:  Negative for agitation, confusion, decreased concentration and sleep disturbance.       Objective:      Physical Exam  Constitutional:       Appearance: She is well-developed.   HENT:      Head: Normocephalic.      Right Ear: Ear canal and external ear normal. Tympanic membrane is retracted.      Left Ear: Ear canal and external ear normal. Tympanic membrane is retracted.      Nose: Septal deviation (To the left), mucosal edema (cyanotic, boggy inferior turbinates bilaterally) and rhinorrhea (clear mucus bilaterally with yellow crusting in both nasal passages on the inferior turbinates and floor the nasal passage) present.      Mouth/Throat:      Mouth: No oral lesions.      Pharynx: Uvula midline.   Eyes:      General: Lids are normal.         Right eye: No discharge.         Left eye: No discharge.      Conjunctiva/sclera:      Right eye: Right conjunctiva is injected. No exudate.     Left eye: Left conjunctiva is injected. No exudate.     Pupils: Pupils are equal, round, and reactive to light.   Neck:      Musculoskeletal: Normal range of motion. No muscular tenderness.      Thyroid: No thyroid mass or thyromegaly.      Trachea: Trachea normal. No tracheal deviation.   Cardiovascular:      Rate and Rhythm: Normal rate and regular rhythm.      Pulses: Normal pulses.      Heart sounds: Normal heart sounds.   Pulmonary:      Effort: Pulmonary effort is normal.      Breath sounds: Normal breath sounds. No stridor. No decreased breath sounds, wheezing, rhonchi or rales.   Abdominal:      General: Bowel sounds are normal.      Palpations: Abdomen is soft.      Tenderness: There is no abdominal tenderness.   Musculoskeletal: Normal range of motion.   Lymphadenopathy:      Head:      Right side of head: No submental, submandibular, preauricular, posterior auricular or occipital adenopathy.      Left side of head: No submental, submandibular, preauricular, posterior auricular or occipital adenopathy.      Cervical: No  cervical adenopathy.   Skin:     General: Skin is warm and dry.      Findings: No petechiae or rash.      Nails: There is no clubbing.     Neurological:      Mental Status: She is alert and oriented to person, place, and time.      Cranial Nerves: No cranial nerve deficit.      Sensory: No sensory deficit.      Gait: Gait normal.   Psychiatric:         Speech: Speech normal.         Behavior: Behavior normal. Behavior is cooperative.         Thought Content: Thought content normal.         Judgment: Judgment normal.          Procedure:  Cerumen is removed from both ear canals using 8 [place]ian suction and bayonet forceps.  The patient tolerated procedure well was discharged post procedure    Assessment:       1. Epistaxis    2. Nasal septal deviation    3. Sensorineural hearing loss (SNHL) of both ears    4. Hearing aid worn    5. Allergic rhinitis, unspecified seasonality, unspecified trigger        Plan:       I will have the patient continue using the nasal moisturization protocol within ointment place 4 times daily.  I will recheck her in 2 weeks.  She is to refrain from blowing her nose.

## 2020-07-08 NOTE — PROGRESS NOTES
Subjective:       Patient ID: Cecilia Munguia is a 74 y.o. female.    Chief Complaint: Follow-up   The patient is returning for follow-up visit.  She has had some minor episodes of epistaxis that all occurred spontaneously or secondary to trauma and resolved with compression.  She is also having some blockage in her ears and some vertigo when she 1st wakes up.  She states that when she gets wax in her ears she will typically have brief episodes of vertigo.  There is no drainage from the ears.  There is no pain in the ears.    Review of Systems   Constitutional: Positive for fatigue. Negative for activity change, appetite change, chills and fever.   HENT: Positive for congestion, ear pain, postnasal drip, rhinorrhea, sinus pressure, sinus pain and sore throat. Negative for ear discharge, facial swelling, hearing loss, mouth sores, sneezing, tinnitus, trouble swallowing and voice change.         Nasal crusting   Eyes: Negative for photophobia, pain, discharge, redness, itching and visual disturbance.   Respiratory: Positive for cough. Negative for apnea, choking, shortness of breath and wheezing.    Cardiovascular: Negative for chest pain and palpitations.   Gastrointestinal: Negative for abdominal distention, abdominal pain, nausea and vomiting.   Musculoskeletal: Positive for arthralgias and joint swelling. Negative for myalgias, neck pain and neck stiffness.   Skin: Negative.  Negative for color change, pallor and rash.   Allergic/Immunologic: Positive for environmental allergies. Negative for food allergies and immunocompromised state.   Neurological: Positive for dizziness, light-headedness and headaches. Negative for speech difficulty and weakness.   Hematological: Negative for adenopathy. Does not bruise/bleed easily.   Psychiatric/Behavioral: Negative for agitation, confusion, decreased concentration and sleep disturbance.       Objective:      Physical Exam  Constitutional:       Appearance: She is  well-developed.   HENT:      Head: Normocephalic.      Right Ear: Ear canal and external ear normal. Tympanic membrane is retracted.      Left Ear: Ear canal and external ear normal. Tympanic membrane is retracted.      Nose: Septal deviation (To the left), mucosal edema (cyanotic, boggy inferior turbinates bilaterally) and rhinorrhea (clear mucus bilaterally with yellow crusting in both nasal passages on the inferior turbinates and floor the nasal passage) present.      Mouth/Throat:      Mouth: No oral lesions.      Pharynx: Uvula midline.   Eyes:      General: Lids are normal.         Right eye: No discharge.         Left eye: No discharge.      Conjunctiva/sclera:      Right eye: Right conjunctiva is injected. No exudate.     Left eye: Left conjunctiva is injected. No exudate.     Pupils: Pupils are equal, round, and reactive to light.   Neck:      Musculoskeletal: Normal range of motion. No muscular tenderness.      Thyroid: No thyroid mass or thyromegaly.      Trachea: Trachea normal. No tracheal deviation.   Cardiovascular:      Rate and Rhythm: Normal rate and regular rhythm.      Pulses: Normal pulses.      Heart sounds: Normal heart sounds.   Pulmonary:      Effort: Pulmonary effort is normal.      Breath sounds: Normal breath sounds. No stridor. No decreased breath sounds, wheezing, rhonchi or rales.   Abdominal:      General: Bowel sounds are normal.      Palpations: Abdomen is soft.      Tenderness: There is no abdominal tenderness.   Musculoskeletal: Normal range of motion.   Lymphadenopathy:      Head:      Right side of head: No submental, submandibular, preauricular, posterior auricular or occipital adenopathy.      Left side of head: No submental, submandibular, preauricular, posterior auricular or occipital adenopathy.      Cervical: No cervical adenopathy.   Skin:     General: Skin is warm and dry.      Findings: No petechiae or rash.      Nails: There is no clubbing.     Neurological:       Mental Status: She is alert and oriented to person, place, and time.      Cranial Nerves: No cranial nerve deficit.      Sensory: No sensory deficit.      Gait: Gait normal.   Psychiatric:         Speech: Speech normal.         Behavior: Behavior normal. Behavior is cooperative.         Thought Content: Thought content normal.         Judgment: Judgment normal.         Procedure-wax is removed from the ear canals bilaterally with 8 [place] suction and bayonet forceps.  The patient tolerated procedure well was discharged post procedure    Assessment:       1. Epistaxis    2. Nasal septal deviation    3. Sensorineural hearing loss (SNHL) of both ears    4. Hearing aid worn    5. Allergic rhinitis, unspecified seasonality, unspecified trigger    6. Bilateral impacted cerumen        Plan:       I will have the patient resume using the nasal moisturization protocol within ointment place 4 times daily.  I will recheck her in 2 weeks.  She is to refrain from blowing her nose.

## 2020-07-21 ENCOUNTER — PATIENT OUTREACH (OUTPATIENT)
Dept: ADMINISTRATIVE | Facility: OTHER | Age: 75
End: 2020-07-21

## 2020-07-21 NOTE — PROGRESS NOTES
Chart reviewed.   Immunizations: LINKS failed   Orders placed: n/a  Upcoming appts to satisfy AMARJIT topics: n/a

## 2020-07-23 ENCOUNTER — OFFICE VISIT (OUTPATIENT)
Dept: OTOLARYNGOLOGY | Facility: CLINIC | Age: 75
End: 2020-07-23
Payer: MEDICARE

## 2020-07-23 VITALS
BODY MASS INDEX: 23.08 KG/M2 | HEART RATE: 63 BPM | DIASTOLIC BLOOD PRESSURE: 77 MMHG | TEMPERATURE: 99 F | HEIGHT: 66 IN | SYSTOLIC BLOOD PRESSURE: 119 MMHG | WEIGHT: 143.63 LBS

## 2020-07-23 DIAGNOSIS — H69.93 DYSFUNCTION OF BOTH EUSTACHIAN TUBES: ICD-10-CM

## 2020-07-23 DIAGNOSIS — J30.9 ALLERGIC RHINITIS, UNSPECIFIED SEASONALITY, UNSPECIFIED TRIGGER: Primary | ICD-10-CM

## 2020-07-23 DIAGNOSIS — Z97.4 HEARING AID WORN: ICD-10-CM

## 2020-07-23 DIAGNOSIS — H90.3 SENSORINEURAL HEARING LOSS (SNHL) OF BOTH EARS: ICD-10-CM

## 2020-07-23 DIAGNOSIS — J34.2 NASAL SEPTAL DEVIATION: ICD-10-CM

## 2020-07-23 PROCEDURE — 99213 OFFICE O/P EST LOW 20 MIN: CPT | Mod: S$GLB,,, | Performed by: SPECIALIST

## 2020-07-23 PROCEDURE — 99213 PR OFFICE/OUTPT VISIT, EST, LEVL III, 20-29 MIN: ICD-10-PCS | Mod: S$GLB,,, | Performed by: SPECIALIST

## 2020-07-23 NOTE — PROGRESS NOTES
Subjective:       Patient ID: Cecilia Munguia is a 75 y.o. female.    Chief Complaint: Follow-up and ringing in the ear (left ear )   The patient is having pressure and ringing in her left ear for the last 2 days.  She she has intermittent tinnitus that is moderate pitch and pulsatile.  She is not having pain in the ears.  She does have underlying hearing loss and wears bilateral hearing aids.  She finds that they are not working well for her in general and are working a little less well with this recent problem.  She has difficulty understanding people and particularly severe difficulty when she is in environment with excessive background noise.  Nasal secretions are clear.  She does not have fever.    Review of Systems   Constitutional: Positive for fatigue. Negative for activity change, appetite change, chills and fever.   HENT: Positive for congestion, ear pain, postnasal drip, rhinorrhea, sinus pressure, sinus pain and sore throat. Negative for ear discharge, facial swelling, hearing loss, mouth sores, sneezing, tinnitus, trouble swallowing and voice change.         Nasal crusting   Eyes: Negative for photophobia, pain, discharge, redness, itching and visual disturbance.   Respiratory: Positive for cough. Negative for apnea, choking, shortness of breath and wheezing.    Cardiovascular: Negative for chest pain and palpitations.   Gastrointestinal: Negative for abdominal distention, abdominal pain, nausea and vomiting.   Musculoskeletal: Positive for arthralgias and joint swelling. Negative for myalgias, neck pain and neck stiffness.   Skin: Negative.  Negative for color change, pallor and rash.   Allergic/Immunologic: Positive for environmental allergies. Negative for food allergies and immunocompromised state.   Neurological: Positive for dizziness, light-headedness and headaches. Negative for speech difficulty and weakness.   Hematological: Negative for adenopathy. Does not bruise/bleed easily.    Psychiatric/Behavioral: Negative for agitation, confusion, decreased concentration and sleep disturbance.       Objective:      Physical Exam  Constitutional:       Appearance: She is well-developed.   HENT:      Head: Normocephalic.      Right Ear: Ear canal and external ear normal. Decreased hearing noted. Tympanic membrane is retracted. Tympanic membrane has decreased mobility.      Left Ear: Ear canal and external ear normal. Decreased hearing noted. Tympanic membrane is retracted. Tympanic membrane has decreased mobility.      Nose: Septal deviation (To the left), mucosal edema (cyanotic, boggy inferior turbinates bilaterally) and rhinorrhea (clear mucus bilaterally with yellow crusting in both nasal passages on the inferior turbinates and floor the nasal passage) present.      Mouth/Throat:      Mouth: No oral lesions.      Pharynx: Uvula midline.   Eyes:      General: Lids are normal.         Right eye: No discharge.         Left eye: No discharge.      Conjunctiva/sclera:      Right eye: Right conjunctiva is injected. No exudate.     Left eye: Left conjunctiva is injected. No exudate.     Pupils: Pupils are equal, round, and reactive to light.   Neck:      Musculoskeletal: Normal range of motion. No muscular tenderness.      Thyroid: No thyroid mass or thyromegaly.      Trachea: Trachea normal. No tracheal deviation.   Cardiovascular:      Rate and Rhythm: Normal rate and regular rhythm.      Pulses: Normal pulses.      Heart sounds: Normal heart sounds.   Pulmonary:      Effort: Pulmonary effort is normal.      Breath sounds: Normal breath sounds. No stridor. No decreased breath sounds, wheezing, rhonchi or rales.   Abdominal:      General: Bowel sounds are normal.      Palpations: Abdomen is soft.      Tenderness: There is no abdominal tenderness.   Musculoskeletal: Normal range of motion.   Lymphadenopathy:      Head:      Right side of head: No submental, submandibular, preauricular, posterior  auricular or occipital adenopathy.      Left side of head: No submental, submandibular, preauricular, posterior auricular or occipital adenopathy.      Cervical: No cervical adenopathy.   Skin:     General: Skin is warm and dry.      Findings: No petechiae or rash.      Nails: There is no clubbing.     Neurological:      Mental Status: She is alert and oriented to person, place, and time.      Cranial Nerves: No cranial nerve deficit.      Sensory: No sensory deficit.      Gait: Gait normal.   Psychiatric:         Speech: Speech normal.         Behavior: Behavior normal. Behavior is cooperative.         Thought Content: Thought content normal.         Judgment: Judgment normal.           Assessment:       1. Allergic rhinitis, unspecified seasonality, unspecified trigger    2. Dysfunction of both eustachian tubes    3. Sensorineural hearing loss (SNHL) of both ears    4. Hearing aid worn    5. Nasal septal deviation        Plan:       I will have the patient return for complete audiologic evaluation.  Will also have sign or record release for all hearing testing results from her audiologist.  I am advising that she use Claritin on a daily basis.

## 2020-08-03 ENCOUNTER — HOSPITAL ENCOUNTER (OUTPATIENT)
Dept: RADIOLOGY | Facility: OTHER | Age: 75
Discharge: HOME | End: 2020-08-03
Attending: ORTHOPAEDIC SURGERY
Payer: MEDICARE

## 2020-08-03 DIAGNOSIS — S82.192A OTHER FRACTURE OF UPPER END OF LEFT TIBIA, INITIAL ENCOUNTER FOR CLOSED FRACTURE: ICD-10-CM

## 2020-08-03 DIAGNOSIS — M25.562 LEFT KNEE PAIN: ICD-10-CM

## 2020-08-03 DIAGNOSIS — S86.292A: ICD-10-CM

## 2020-08-03 PROCEDURE — 73721 MRI KNEE WITHOUT CONTRAST LEFT: ICD-10-PCS | Mod: 26,LT,, | Performed by: RADIOLOGY

## 2020-08-03 PROCEDURE — 73721 MRI JNT OF LWR EXTRE W/O DYE: CPT | Mod: 26,LT,, | Performed by: RADIOLOGY

## 2020-08-03 PROCEDURE — 73721 MRI JNT OF LWR EXTRE W/O DYE: CPT | Mod: TC,LT

## 2020-08-11 ENCOUNTER — TELEPHONE (OUTPATIENT)
Dept: SLEEP MEDICINE | Facility: CLINIC | Age: 75
End: 2020-08-11

## 2020-08-11 ENCOUNTER — TELEPHONE (OUTPATIENT)
Dept: NEUROLOGY | Facility: CLINIC | Age: 75
End: 2020-08-11

## 2020-08-11 NOTE — TELEPHONE ENCOUNTER
----- Message from Ranjana Leonard sent at 8/11/2020 11:54 AM CDT -----  Regarding: pt  Type: Patient Call Back    Who called:pt    What is the request in detail:pt has questions in regards to the doctor's speciality. She wants to know if she is going to right doctor. Call pt    Can the clinic reply by [facility]?    Would the patient rather a call back or a response via [facility]? call    Best call back number:[phone] (home) [phone]      Additional Information:

## 2020-08-11 NOTE — TELEPHONE ENCOUNTER
Returned call to patient and provided scheduling number to Dr. Sahni's office (Tel: [phone]). Mrs. Brooks is looking to establish care with Neuromuscular specialist. She is a former patient of Dr. Johnson and had transferred care to [facility]. Dx: [facility]    Recently patient fractured knee when twisted it trying to prevent from falling.     Patient has a grandson 3 weeks old that she is looking forward to spoiling and assisting in his care.

## 2020-08-11 NOTE — TELEPHONE ENCOUNTER
Completed Message      Phone Number called [phone]    Spoke With Cecilia Munguia    Request requesting an appointment with Dr. Sahni       Completed Action Patient is scheduled on 10/26/20 at 11:15 and has jsoeph placed on wait list

## 2020-08-11 NOTE — TELEPHONE ENCOUNTER
----- Message from Thiago Park sent at 8/10/2020  3:10 PM CDT -----  Contact: pt @ [phone]  Pt is asking to speak w/ you regarding a neurology doctor you mentioned to her, pt is needing the name, pt states it may have been a doctor at [facility]

## 2020-09-07 ENCOUNTER — NURSE TRIAGE (OUTPATIENT)
Dept: ADMINISTRATIVE | Facility: CLINIC | Age: 75
End: 2020-09-07

## 2020-09-07 ENCOUNTER — TELEPHONE (OUTPATIENT)
Dept: INTERNAL MEDICINE | Facility: CLINIC | Age: 75
End: 2020-09-07

## 2020-09-07 RX ORDER — CIPROFLOXACIN 500 MG/1
500 TABLET ORAL 2 TIMES DAILY
Qty: 10 TABLET | Refills: 0 | Status: SHIPPED | OUTPATIENT
Start: 2020-09-07 | End: 2020-09-12

## 2020-09-07 RX ORDER — ERGOCALCIFEROL 1.25 MG/1
CAPSULE ORAL
COMMUNITY
Start: 2020-07-15 | End: 2020-12-04 | Stop reason: CLARIF

## 2020-09-07 RX ORDER — MIRABEGRON 25 MG/1
TABLET, FILM COATED, EXTENDED RELEASE ORAL
COMMUNITY
Start: 2020-09-03

## 2020-09-28 ENCOUNTER — PATIENT OUTREACH (OUTPATIENT)
Dept: ADMINISTRATIVE | Facility: OTHER | Age: 75
End: 2020-09-28

## 2020-09-28 NOTE — PROGRESS NOTES
Health Maintenance Due   Topic Date Due    TETANUS VACCINE  07/13/1963    Shingles Vaccine (1 of 2) 07/13/1997    Pneumococcal Vaccine (65+ Low/Medium Risk) (1 of 2 - PCV13) 07/13/2012     Updates were requested from care everywhere.  Chart was reviewed for overdue Proactive [facility] Encounters (AMARJIT) topics (CRS, Breast Cancer Screening, Eye exam)  Health Maintenance has been updated.  LINKS immunization registry triggered.  Immunizations were reconciled.

## 2020-09-29 ENCOUNTER — CLINICAL SUPPORT (OUTPATIENT)
Dept: OTOLARYNGOLOGY | Facility: CLINIC | Age: 75
End: 2020-09-29
Payer: MEDICARE

## 2020-09-29 ENCOUNTER — OFFICE VISIT (OUTPATIENT)
Dept: OTOLARYNGOLOGY | Facility: CLINIC | Age: 75
End: 2020-09-29
Payer: MEDICARE

## 2020-09-29 VITALS
BODY MASS INDEX: 23.06 KG/M2 | WEIGHT: 143.5 LBS | SYSTOLIC BLOOD PRESSURE: 108 MMHG | HEIGHT: 66 IN | DIASTOLIC BLOOD PRESSURE: 72 MMHG | HEART RATE: 64 BPM | TEMPERATURE: 99 F

## 2020-09-29 DIAGNOSIS — H93.12 TINNITUS OF LEFT EAR: ICD-10-CM

## 2020-09-29 DIAGNOSIS — H93.12 TINNITUS, LEFT EAR: ICD-10-CM

## 2020-09-29 DIAGNOSIS — R29.2 ABNORMAL ACOUSTIC REFLEX: ICD-10-CM

## 2020-09-29 DIAGNOSIS — Z97.4 WEARS HEARING AID IN BOTH EARS: ICD-10-CM

## 2020-09-29 DIAGNOSIS — J30.9 ALLERGIC RHINITIS, UNSPECIFIED SEASONALITY, UNSPECIFIED TRIGGER: ICD-10-CM

## 2020-09-29 DIAGNOSIS — H90.3 SENSORINEURAL HEARING LOSS (SNHL) OF BOTH EARS: Primary | ICD-10-CM

## 2020-09-29 DIAGNOSIS — H90.3 BILATERAL SENSORINEURAL HEARING LOSS: Primary | ICD-10-CM

## 2020-09-29 DIAGNOSIS — Z97.4 HEARING AID WORN: ICD-10-CM

## 2020-09-29 DIAGNOSIS — H93.299 REDUCED SPEECH DISCRIMINATION: ICD-10-CM

## 2020-09-29 DIAGNOSIS — Z82.2 FAMILY HISTORY OF HEARING LOSS: ICD-10-CM

## 2020-09-29 DIAGNOSIS — H61.23 BILATERAL IMPACTED CERUMEN: ICD-10-CM

## 2020-09-29 PROCEDURE — 92550 TYMPANOMETRY & REFLEX THRESH: CPT | Mod: S$GLB,,, | Performed by: AUDIOLOGIST-HEARING AID FITTER

## 2020-09-29 PROCEDURE — 92557 COMPREHENSIVE HEARING TEST: CPT | Mod: S$GLB,,, | Performed by: AUDIOLOGIST-HEARING AID FITTER

## 2020-09-29 PROCEDURE — 92550 PR TYMPANOMETRY AND REFLEX THRESHOLD MEASUREMENTS: ICD-10-PCS | Mod: S$GLB,,, | Performed by: AUDIOLOGIST-HEARING AID FITTER

## 2020-09-29 PROCEDURE — 99214 PR OFFICE/OUTPT VISIT, EST, LEVL IV, 30-39 MIN: ICD-10-PCS | Mod: S$GLB,,, | Performed by: SPECIALIST

## 2020-09-29 PROCEDURE — 92557 PR COMPREHENSIVE HEARING TEST: ICD-10-PCS | Mod: S$GLB,,, | Performed by: AUDIOLOGIST-HEARING AID FITTER

## 2020-09-29 PROCEDURE — 99214 OFFICE O/P EST MOD 30 MIN: CPT | Mod: S$GLB,,, | Performed by: SPECIALIST

## 2020-09-29 NOTE — PROGRESS NOTES
Emiliana Orosco, CCC-A  Audiologist - [facility] [address] [place] [address]  [email or web address]  [phone]    Patient: Cecilia Munguia   MRN: [id number]  [address] [facility]   Home Phone [phone]   Work Phone Not on file.   Mobile [phone]   : 1945  MONTOYA: 2020      AUDIOLOGICAL EVALUATION      RECOMMENDATIONS:   It is recommended that Cecilia Munguia:   Follow up medically with Dr. Franco to assess her hearing loss and tinnitus.   Continue wearing binaural hearing aids to improve speech understanding.   Continue to receive audiological monitoring annually.   Use precaution and/or hearing protection in noisy environments.    If you should have any questions or concerns regarding the above information, please do not hesitate to contact me at [phone].      _______________________________  Emiliana Orosco, CCC-A, F-AAA  Audiologist

## 2020-09-29 NOTE — Clinical Note
Your patient, Cecilia Munguia, was recently seen for an audiogram.  My assessment and recommendations are enclosed.    If you should have any questions or concerns, please contact me at [phone].     Sincerely,  Emiliana Orosco, CCC-A, F-[facility]  Audiologist  [facility]

## 2020-09-29 NOTE — PROGRESS NOTES
Subjective:       Patient ID: Cecilia Munguia is a 75 y.o. female.    Chief Complaint: Follow-up (with audio result/ ringing in ear)       The patient is returning for follow-up visit.  There are multiple issues to discuss:  1.  Hearing loss/hearing aids:  She has been wearing her current hearing aid for almost 5 years.  She is not satisfied with the results of improvement.  She does get feedback from a hearing aid, particularly 1 on the right.  2.  Tinnitus:  The tinnitus in her left ear has not changed for the better or for the worse.  3.  Allergic rhinitis:  She is using Claritin on a p.r.n. basis to control her allergy symptoms.  4.  Vertigo:  She has not had further episodes of vertigo.  5.  Two months ago she did fall at home and sustained a fibular fracture on the left.  She is using a cane.  He did not require casting her surgery.      Review of Systems   Constitutional: Positive for fatigue. Negative for activity change, appetite change, chills and fever.   HENT: Positive for congestion, ear pain, postnasal drip, rhinorrhea, sinus pressure, sinus pain and sore throat. Negative for ear discharge, facial swelling, hearing loss, mouth sores, sneezing, tinnitus, trouble swallowing and voice change.         Nasal crusting   Eyes: Negative for photophobia, pain, discharge, redness, itching and visual disturbance.   Respiratory: Positive for cough. Negative for apnea, choking, shortness of breath and wheezing.    Cardiovascular: Negative for chest pain and palpitations.   Gastrointestinal: Negative for abdominal distention, abdominal pain, nausea and vomiting.   Musculoskeletal: Positive for arthralgias and joint swelling. Negative for myalgias, neck pain and neck stiffness.   Skin: Negative.  Negative for color change, pallor and rash.   Allergic/Immunologic: Positive for environmental allergies. Negative for food allergies and immunocompromised state.   Neurological: Positive for dizziness, light-headedness and  headaches. Negative for speech difficulty and weakness.   Hematological: Negative for adenopathy. Does not bruise/bleed easily.   Psychiatric/Behavioral: Negative for agitation, confusion, decreased concentration and sleep disturbance.       Objective:      Physical Exam  Constitutional:       Appearance: She is well-developed.   HENT:      Head: Normocephalic.      Right Ear: Ear canal and external ear normal. Decreased hearing noted. Tympanic membrane is retracted. Tympanic membrane has decreased mobility.      Left Ear: Ear canal and external ear normal. Decreased hearing noted. Tympanic membrane is retracted. Tympanic membrane has decreased mobility.      Nose: Septal deviation (To the left), mucosal edema (cyanotic, boggy inferior turbinates bilaterally) and rhinorrhea (clear mucus bilaterally with yellow crusting in both nasal passages on the inferior turbinates and floor the nasal passage) present.      Mouth/Throat:      Mouth: No oral lesions.      Pharynx: Uvula midline.   Eyes:      General: Lids are normal.         Right eye: No discharge.         Left eye: No discharge.      Conjunctiva/sclera:      Right eye: Right conjunctiva is injected. No exudate.     Left eye: Left conjunctiva is injected. No exudate.     Pupils: Pupils are equal, round, and reactive to light.   Neck:      Musculoskeletal: Normal range of motion. No muscular tenderness.      Thyroid: No thyroid mass or thyromegaly.      Trachea: Trachea normal. No tracheal deviation.   Cardiovascular:      Rate and Rhythm: Normal rate and regular rhythm.      Pulses: Normal pulses.      Heart sounds: Normal heart sounds.   Pulmonary:      Effort: Pulmonary effort is normal.      Breath sounds: Normal breath sounds. No stridor. No decreased breath sounds, wheezing, rhonchi or rales.   Abdominal:      General: Bowel sounds are normal.      Palpations: Abdomen is soft.      Tenderness: There is no abdominal tenderness.   Musculoskeletal: Normal range  of motion.   Lymphadenopathy:      Head:      Right side of head: No submental, submandibular, preauricular, posterior auricular or occipital adenopathy.      Left side of head: No submental, submandibular, preauricular, posterior auricular or occipital adenopathy.      Cervical: No cervical adenopathy.   Skin:     General: Skin is warm and dry.      Findings: No petechiae or rash.      Nails: There is no clubbing.     Neurological:      Mental Status: She is alert and oriented to person, place, and time.      Cranial Nerves: No cranial nerve deficit.      Sensory: No sensory deficit.      Gait: Gait normal.   Psychiatric:         Speech: Speech normal.         Behavior: Behavior normal. Behavior is cooperative.         Thought Content: Thought content normal.         Judgment: Judgment normal.           I personally reviewed the above audiogram and compared it to an outside audiogram done in May 2019.  Pure tone thresholds are the same and her discrimination scores in both ears actually improved from the 2019 audiogram.  I discussed the audiogram in full detail with the patient at the end of her visit.     Assessment:       1. Sensorineural hearing loss (SNHL) of both ears    2. Hearing aid worn    3. Bilateral impacted cerumen    4. Tinnitus of left ear    5. Allergic rhinitis, unspecified seasonality, unspecified trigger        Plan:       I will have the patient schedule a hearing aid consultation with the audiologist.  She will continue with the Claritin she takes for her allergies.  I will recheck her in 6 months, or sooner on an as-needed basis.

## 2020-10-01 ENCOUNTER — PATIENT MESSAGE (OUTPATIENT)
Dept: OTHER | Facility: OTHER | Age: 75
End: 2020-10-01

## 2020-10-09 ENCOUNTER — TELEPHONE (OUTPATIENT)
Dept: NEUROLOGY | Facility: CLINIC | Age: 75
End: 2020-10-09

## 2020-10-09 NOTE — TELEPHONE ENCOUNTER
----- Message from Prince Pascual sent at 10/9/2020 11:57 AM CDT -----  Regarding: Appointment  Contact: EDUAR SEAY [[id number]]  Name of Who is Calling: EDUAR SEAY [[id number]]      What is the request in detail: Would like to speak with staff in regards to scheduling an appointment. Please advise      Can the clinic reply by [facility]: yes      What [facility]: [phone]

## 2020-10-09 NOTE — TELEPHONE ENCOUNTER
I scheduled pt with Dr. Sahni on 1/7/2021 at 1:15. I added her on the wait list for sooner appt. She prefers to see Dr. Sahni for Neuropathy.

## 2020-10-29 ENCOUNTER — TELEPHONE (OUTPATIENT)
Dept: INTERNAL MEDICINE | Facility: CLINIC | Age: 75
End: 2020-10-29

## 2020-10-29 DIAGNOSIS — I10 ESSENTIAL HYPERTENSION: Chronic | ICD-10-CM

## 2020-10-29 RX ORDER — LOSARTAN POTASSIUM 50 MG/1
50 TABLET ORAL DAILY
Qty: 90 TABLET | Refills: 3 | Status: SHIPPED | OUTPATIENT
Start: 2020-10-29 | End: 2021-12-01

## 2020-10-29 NOTE — TELEPHONE ENCOUNTER
----- Message from Prince Pascual sent at 10/29/2020 11:53 AM CDT -----  Regarding: Refill  Contact: EDUAR SEAY [[id number]]      Can the clinic reply in [facility]: no      Please refill the medication(s) listed below. Please call the patient when the prescription(s) is ready for  at this phone number    [phone]      Medication #1 losartan (COZAAR) 50 MG tablet (out)    Preferred Pharmacy: [id number] - [place] - [address]

## 2020-12-01 ENCOUNTER — TELEPHONE (OUTPATIENT)
Dept: INTERNAL MEDICINE | Facility: CLINIC | Age: 75
End: 2020-12-01

## 2020-12-01 NOTE — TELEPHONE ENCOUNTER
----- Message from Carlos Mckeon sent at 12/1/2020 10:50 AM CST -----   Name of Who is Calling:     What is the request in detail: patient request call back in reference to recommendation for gallbladder surgery  / patient state she need to know soon   //she need the surgery  Please contact to further discuss and advise      Can the clinic reply by [facility]: no    What Number to Call Back if not in [facility]:  [phone]

## 2020-12-01 NOTE — TELEPHONE ENCOUNTER
Went to [facility] on Sunday, they said she needs to get gallbladder removed. She has records for this. Scheduled for ER f/u 12/9 will come in at 7am, scheduled 7:45.    Patient was prescribed levofloxacin 500mg and has concerns for the side effects and would like to know if she should continue taking this.     Pt prefers response in patient portal.

## 2020-12-03 ENCOUNTER — PATIENT MESSAGE (OUTPATIENT)
Dept: INTERNAL MEDICINE | Facility: CLINIC | Age: 75
End: 2020-12-03

## 2020-12-04 ENCOUNTER — HOSPITAL ENCOUNTER (OUTPATIENT)
Dept: PREADMISSION TESTING | Facility: OTHER | Age: 75
Discharge: HOME | End: 2020-12-04
Attending: SPECIALIST
Payer: MEDICARE

## 2020-12-04 ENCOUNTER — ANESTHESIA EVENT (OUTPATIENT)
Dept: SURGERY | Facility: OTHER | Age: 75
End: 2020-12-04
Payer: MEDICARE

## 2020-12-04 VITALS
HEIGHT: 66 IN | SYSTOLIC BLOOD PRESSURE: 123 MMHG | HEART RATE: 75 BPM | WEIGHT: 140 LBS | DIASTOLIC BLOOD PRESSURE: 75 MMHG | RESPIRATION RATE: 16 BRPM | TEMPERATURE: 97 F | OXYGEN SATURATION: 98 % | BODY MASS INDEX: 22.5 KG/M2

## 2020-12-04 RX ORDER — ACETAMINOPHEN 500 MG
1000 TABLET ORAL
Status: CANCELLED | OUTPATIENT
Start: 2020-12-04 | End: 2020-12-04

## 2020-12-04 RX ORDER — SODIUM CHLORIDE, SODIUM LACTATE, POTASSIUM CHLORIDE, CALCIUM CHLORIDE 600; 310; 30; 20 MG/100ML; MG/100ML; MG/100ML; MG/100ML
INJECTION, SOLUTION INTRAVENOUS CONTINUOUS
Status: CANCELLED | OUTPATIENT
Start: 2020-12-04

## 2020-12-04 RX ORDER — MELOXICAM 15 MG/1
15 TABLET ORAL DAILY
COMMUNITY
End: 2021-02-04 | Stop reason: SDUPTHER

## 2020-12-04 NOTE — ANESTHESIA PREPROCEDURE EVALUATION
12/04/2020  Cecilia Munguia is a 75 y.o., female.    Anesthesia Evaluation    I have reviewed the Patient Summary Reports.    I have reviewed the Nursing Notes. I have reviewed the NPO Status.   I have reviewed the Medications.     Review of Systems  Anesthesia Hx:  No problems with previous Anesthesia  Denies Family Hx of Anesthesia complications.   Denies Personal Hx of Anesthesia complications.   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 Doing well now.  Peripheral Neuropathy    Endocrine:  Endocrine Normal    Dermatological:  Skin Normal    Psych:   anxiety          Physical Exam  General:  Well nourished    Airway/Jaw/Neck:  Airway Findings: Mouth Opening: Normal Tongue: Normal  General Airway Assessment: Adult  Mallampati: I  TM Distance: Normal, at least 6 cm  Jaw/Neck Findings:  Neck ROM: Normal ROM      Dental:  Dental Findings: In tact             Anesthesia Plan  Type of Anesthesia, risks & benefits discussed:  Anesthesia Type:  general  Patient's Preference:   Intra-op Monitoring Plan: standard ASA monitors  Intra-op Monitoring Plan Comments:   Post Op Pain Control Plan: per primary service following discharge from PACU and multimodal analgesia  Post Op Pain Control Plan Comments:   Induction:   IV  Beta Blocker:         Informed Consent: Patient understands risks and agrees with Anesthesia plan.  Questions answered. Anesthesia consent signed with patient.  ASA Score: 2     Day of Surgery Review of History & Physical:    H&P update referred to the  surgeon.         Ready For Surgery From Anesthesia Perspective.

## 2020-12-04 NOTE — DISCHARGE INSTRUCTIONS
Information to Prepare you for your Surgery    PRE-ADMIT TESTING -  [phone]    [address]  [place] [facility]          Your surgery has been scheduled at [facility]. We are pleased to have the opportunity to serve you. For Further Information please call [phone].    On the day of surgery please report to the Information Desk on the 1st floor.    · CONTACT YOUR PHYSICIAN'S OFFICE THE DAY PRIOR TO YOUR SURGERY TO OBTAIN YOUR ARRIVAL TIME.     · The evening before surgery do not eat anything after 9 p.m. ( this includes hard candy, chewing gum and mints).  You may only have GATORADE, POWERADE AND WATER  from 9 p.m. until you leave your home.   DO NOT DRINK ANY LIQUIDS ON THE WAY TO THE HOSPITAL.      SPECIAL MEDICATION INSTRUCTIONS: TAKE medications checked off by the Anesthesiologist on your Medication List.    Angiogram Patients: Take medications as instructed by your physician, including aspirin.     Surgery Patients:    If you take ASPIRIN - Your PHYSICIAN/SURGEON will need to inform you IF/OR when you need to stop taking aspirin prior to your surgery.     Do Not take any medications containing IBUPROFEN.  Do Not Wear any make-up or dark nail polish   (especially eye make-up) to surgery. If you come to surgery with makeup on you will be required to remove the makeup or nail polish.    Do not shave your surgical area at least 5 days prior to your surgery. The surgical prep will be performed at the hospital according to Infection Control regulations.    Leave all valuables at home.   Do Not wear any jewelry or watches, including any metal in body piercings. Jewelry must be removed prior to coming to the hospital.  There is a possibility that rings that are unable to be removed may be cut off if they are on the surgical extremity.    Contact Lens must be removed before surgery. Either do not wear the contact lens or bring a case and solution for  storage.  Please bring a container for eyeglasses or dentures as required.  Bring any paperwork your physician has provided, such as consent forms,  history and physicals, doctor's orders, etc.   Bring comfortable clothes that are loose fitting to wear upon discharge. Take into consideration the type of surgery being performed.  Maintain your diet as advised per your physician the day prior to surgery.      Adequate rest the night before surgery is advised.   Park in the Parking lot behind the hospital or in the [facility] across the street from the parking lot. Parking is complimentary.  If you will be discharged the same day as your procedure, please arrange for a responsible adult to drive you home or to accompany you if traveling by taxi.   YOU WILL NOT BE PERMITTED TO DRIVE OR TO LEAVE THE HOSPITAL ALONE AFTER SURGERY.   If you are being discharged the same day, it is strongly recommended that you arrange for someone to remain with you for the first 24 hrs following your surgery.    The Surgeon will speak to your family/visitor after your surgery regarding the outcome of your surgery and post op care.  The Surgeon may speak to you after your surgery, but there is a possibility you may not remember the details.  Please check with your family members regarding the conversation with the Surgeon.    We strongly recommend whoever is bringing you home be present for discharge instructions.  This will ensure a thorough understanding for your post op home care.    ALL CHILDREN MUST ALWAYS BE ACCOMPANIED BY AN ADULT.    Visitors-Refer to current Visitor policy handouts.    Thank you for your cooperation.  The Staff of [facility].                Bathing Instructions with Hibiclens     Shower the evening before and morning of your procedure with Hibiclens:   Wash your face with water and your regular face wash/soap   Apply Hibiclens directly on your skin or on a wet washcloth and wash  gently. When showering: Move away from the shower stream when applying Hibiclens to avoid rinsing off too soon.   Rinse thoroughly with warm water   Do not dilute Hibiclens         Dry off as usual, do not use any deodorant, powder, body lotions, perfume, after shave or cologne.

## 2020-12-05 ENCOUNTER — LAB VISIT (OUTPATIENT)
Dept: INTERNAL MEDICINE | Facility: CLINIC | Age: 75
End: 2020-12-05
Payer: MEDICARE

## 2020-12-05 DIAGNOSIS — Z01.818 PREOP TESTING: ICD-10-CM

## 2020-12-05 PROCEDURE — U0003 INFECTIOUS AGENT DETECTION BY NUCLEIC ACID (DNA OR RNA); SEVERE ACUTE RESPIRATORY SYNDROME CORONAVIRUS 2 (SARS-COV-2) (CORONAVIRUS DISEASE [COVID-19]), AMPLIFIED PROBE TECHNIQUE, MAKING USE OF HIGH THROUGHPUT TECHNOLOGIES AS DESCRIBED BY CMS-2020-01-R: HCPCS

## 2020-12-06 LAB — SARS-COV-2 RNA RESP QL NAA+PROBE: NOT DETECTED

## 2020-12-08 ENCOUNTER — HOSPITAL ENCOUNTER (OUTPATIENT)
Facility: OTHER | Age: 75
Discharge: HOME | End: 2020-12-08
Attending: SPECIALIST | Admitting: SPECIALIST
Payer: MEDICARE

## 2020-12-08 ENCOUNTER — ANESTHESIA (OUTPATIENT)
Dept: SURGERY | Facility: OTHER | Age: 75
End: 2020-12-08
Payer: MEDICARE

## 2020-12-08 VITALS
HEIGHT: 66 IN | RESPIRATION RATE: 16 BRPM | TEMPERATURE: 98 F | HEART RATE: 62 BPM | SYSTOLIC BLOOD PRESSURE: 138 MMHG | BODY MASS INDEX: 22.5 KG/M2 | OXYGEN SATURATION: 99 % | WEIGHT: 140 LBS | DIASTOLIC BLOOD PRESSURE: 71 MMHG

## 2020-12-08 DIAGNOSIS — K82.9 DISEASE OF GALLBLADDER: Primary | ICD-10-CM

## 2020-12-08 DIAGNOSIS — Z01.818 PREOP TESTING: ICD-10-CM

## 2020-12-08 PROCEDURE — 71000015 HC POSTOP RECOV 1ST HR: Performed by: SPECIALIST

## 2020-12-08 PROCEDURE — 63600175 PHARM REV CODE 636 W HCPCS: Performed by: SPECIALIST

## 2020-12-08 PROCEDURE — 27201423 OPTIME MED/SURG SUP & DEVICES STERILE SUPPLY: Performed by: SPECIALIST

## 2020-12-08 PROCEDURE — 71000033 HC RECOVERY, INTIAL HOUR: Performed by: SPECIALIST

## 2020-12-08 PROCEDURE — 25000003 PHARM REV CODE 250: Performed by: SPECIALIST

## 2020-12-08 PROCEDURE — 63600175 PHARM REV CODE 636 W HCPCS: Performed by: ANESTHESIOLOGY

## 2020-12-08 PROCEDURE — 36000709 HC OR TIME LEV III EA ADD 15 MIN: Performed by: SPECIALIST

## 2020-12-08 PROCEDURE — 63600175 PHARM REV CODE 636 W HCPCS: Performed by: NURSE ANESTHETIST, CERTIFIED REGISTERED

## 2020-12-08 PROCEDURE — 71000039 HC RECOVERY, EACH ADD'L HOUR: Performed by: SPECIALIST

## 2020-12-08 PROCEDURE — 88304 TISSUE EXAM BY PATHOLOGIST: CPT | Mod: 26,,, | Performed by: PATHOLOGY

## 2020-12-08 PROCEDURE — 71000016 HC POSTOP RECOV ADDL HR: Performed by: SPECIALIST

## 2020-12-08 PROCEDURE — 37000009 HC ANESTHESIA EA ADD 15 MINS: Performed by: SPECIALIST

## 2020-12-08 PROCEDURE — 37000008 HC ANESTHESIA 1ST 15 MINUTES: Performed by: SPECIALIST

## 2020-12-08 PROCEDURE — 88304 PR  SURG PATH,LEVEL III: ICD-10-PCS | Mod: 26,,, | Performed by: PATHOLOGY

## 2020-12-08 PROCEDURE — 25000003 PHARM REV CODE 250: Performed by: NURSE ANESTHETIST, CERTIFIED REGISTERED

## 2020-12-08 PROCEDURE — 88304 TISSUE EXAM BY PATHOLOGIST: CPT | Performed by: PATHOLOGY

## 2020-12-08 PROCEDURE — 36000708 HC OR TIME LEV III 1ST 15 MIN: Performed by: SPECIALIST

## 2020-12-08 RX ORDER — OXYCODONE HYDROCHLORIDE 5 MG/1
5 TABLET ORAL
Status: DISCONTINUED | OUTPATIENT
Start: 2020-12-08 | End: 2020-12-08 | Stop reason: HOSPADM

## 2020-12-08 RX ORDER — DEXAMETHASONE SODIUM PHOSPHATE 4 MG/ML
INJECTION, SOLUTION INTRA-ARTICULAR; INTRALESIONAL; INTRAMUSCULAR; INTRAVENOUS; SOFT TISSUE
Status: DISCONTINUED | OUTPATIENT
Start: 2020-12-08 | End: 2020-12-08

## 2020-12-08 RX ORDER — ACETAMINOPHEN 500 MG
1000 TABLET ORAL
Status: DISCONTINUED | OUTPATIENT
Start: 2020-12-08 | End: 2020-12-08 | Stop reason: HOSPADM

## 2020-12-08 RX ORDER — SODIUM CHLORIDE, SODIUM LACTATE, POTASSIUM CHLORIDE, CALCIUM CHLORIDE 600; 310; 30; 20 MG/100ML; MG/100ML; MG/100ML; MG/100ML
INJECTION, SOLUTION INTRAVENOUS CONTINUOUS
Status: DISCONTINUED | OUTPATIENT
Start: 2020-12-08 | End: 2020-12-08 | Stop reason: HOSPADM

## 2020-12-08 RX ORDER — ONDANSETRON HYDROCHLORIDE 2 MG/ML
INJECTION, SOLUTION INTRAMUSCULAR; INTRAVENOUS
Status: DISCONTINUED | OUTPATIENT
Start: 2020-12-08 | End: 2020-12-08

## 2020-12-08 RX ORDER — PROPOFOL 10 MG/ML
VIAL (ML) INTRAVENOUS
Status: DISCONTINUED | OUTPATIENT
Start: 2020-12-08 | End: 2020-12-08

## 2020-12-08 RX ORDER — SODIUM CHLORIDE 9 MG/ML
INJECTION, SOLUTION INTRAVENOUS CONTINUOUS
Status: DISCONTINUED | OUTPATIENT
Start: 2020-12-08 | End: 2020-12-08 | Stop reason: HOSPADM

## 2020-12-08 RX ORDER — FENTANYL CITRATE 50 UG/ML
INJECTION, SOLUTION INTRAMUSCULAR; INTRAVENOUS
Status: DISCONTINUED | OUTPATIENT
Start: 2020-12-08 | End: 2020-12-08

## 2020-12-08 RX ORDER — DIPHENHYDRAMINE HYDROCHLORIDE 50 MG/ML
25 INJECTION INTRAMUSCULAR; INTRAVENOUS EVERY 6 HOURS PRN
Status: DISCONTINUED | OUTPATIENT
Start: 2020-12-08 | End: 2020-12-08 | Stop reason: HOSPADM

## 2020-12-08 RX ORDER — LIDOCAINE HCL/PF 100 MG/5ML
SYRINGE (ML) INTRAVENOUS
Status: DISCONTINUED | OUTPATIENT
Start: 2020-12-08 | End: 2020-12-08

## 2020-12-08 RX ORDER — SODIUM CHLORIDE 0.9 % (FLUSH) 0.9 %
3 SYRINGE (ML) INJECTION
Status: DISCONTINUED | OUTPATIENT
Start: 2020-12-08 | End: 2020-12-08 | Stop reason: HOSPADM

## 2020-12-08 RX ORDER — ROCURONIUM BROMIDE 10 MG/ML
INJECTION, SOLUTION INTRAVENOUS
Status: DISCONTINUED | OUTPATIENT
Start: 2020-12-08 | End: 2020-12-08

## 2020-12-08 RX ORDER — HYDROMORPHONE HYDROCHLORIDE 2 MG/ML
0.4 INJECTION, SOLUTION INTRAMUSCULAR; INTRAVENOUS; SUBCUTANEOUS EVERY 5 MIN PRN
Status: DISCONTINUED | OUTPATIENT
Start: 2020-12-08 | End: 2020-12-08 | Stop reason: HOSPADM

## 2020-12-08 RX ORDER — PHENYLEPHRINE HYDROCHLORIDE 10 MG/ML
INJECTION INTRAVENOUS
Status: DISCONTINUED | OUTPATIENT
Start: 2020-12-08 | End: 2020-12-08

## 2020-12-08 RX ORDER — NEOSTIGMINE METHYLSULFATE 1 MG/ML
INJECTION, SOLUTION INTRAVENOUS
Status: DISCONTINUED | OUTPATIENT
Start: 2020-12-08 | End: 2020-12-08

## 2020-12-08 RX ORDER — ONDANSETRON 2 MG/ML
4 INJECTION INTRAMUSCULAR; INTRAVENOUS DAILY PRN
Status: DISCONTINUED | OUTPATIENT
Start: 2020-12-08 | End: 2020-12-08 | Stop reason: HOSPADM

## 2020-12-08 RX ORDER — HYDROCODONE BITARTRATE AND ACETAMINOPHEN 5; 325 MG/1; MG/1
TABLET ORAL
Qty: 20 TABLET | Refills: 0 | Status: SHIPPED | OUTPATIENT
Start: 2020-12-08 | End: 2021-02-04

## 2020-12-08 RX ORDER — CEFAZOLIN SODIUM 1 G/3ML
1 INJECTION, POWDER, FOR SOLUTION INTRAMUSCULAR; INTRAVENOUS
Status: COMPLETED | OUTPATIENT
Start: 2020-12-08 | End: 2020-12-08

## 2020-12-08 RX ORDER — MEPERIDINE HYDROCHLORIDE 25 MG/ML
12.5 INJECTION INTRAMUSCULAR; INTRAVENOUS; SUBCUTANEOUS ONCE AS NEEDED
Status: DISCONTINUED | OUTPATIENT
Start: 2020-12-08 | End: 2020-12-08 | Stop reason: HOSPADM

## 2020-12-08 RX ADMIN — GLYCOPYRROLATE 0.4 MG: 0.2 INJECTION, SOLUTION INTRAMUSCULAR; INTRAVITREAL at 09:12

## 2020-12-08 RX ADMIN — PROPOFOL 160 MG: 10 INJECTION, EMULSION INTRAVENOUS at 08:12

## 2020-12-08 RX ADMIN — GLYCOPYRROLATE 0.2 MG: 0.2 INJECTION, SOLUTION INTRAMUSCULAR; INTRAVITREAL at 09:12

## 2020-12-08 RX ADMIN — PROMETHAZINE HYDROCHLORIDE 6.25 MG: 25 INJECTION INTRAMUSCULAR; INTRAVENOUS at 11:12

## 2020-12-08 RX ADMIN — CARBOXYMETHYLCELLULOSE SODIUM 2 DROP: 2.5 SOLUTION/ DROPS OPHTHALMIC at 08:12

## 2020-12-08 RX ADMIN — PHENYLEPHRINE HYDROCHLORIDE 100 MCG: 10 INJECTION INTRAVENOUS at 09:12

## 2020-12-08 RX ADMIN — SODIUM CHLORIDE, SODIUM LACTATE, POTASSIUM CHLORIDE, AND CALCIUM CHLORIDE: 600; 310; 30; 20 INJECTION, SOLUTION INTRAVENOUS at 09:12

## 2020-12-08 RX ADMIN — SODIUM CHLORIDE, SODIUM LACTATE, POTASSIUM CHLORIDE, AND CALCIUM CHLORIDE: 600; 310; 30; 20 INJECTION, SOLUTION INTRAVENOUS at 08:12

## 2020-12-08 RX ADMIN — FENTANYL CITRATE 100 MCG: 50 INJECTION, SOLUTION INTRAMUSCULAR; INTRAVENOUS at 08:12

## 2020-12-08 RX ADMIN — CEFAZOLIN 1 G: 330 INJECTION, POWDER, FOR SOLUTION INTRAMUSCULAR; INTRAVENOUS at 09:12

## 2020-12-08 RX ADMIN — DEXAMETHASONE SODIUM PHOSPHATE 8 MG: 4 INJECTION, SOLUTION INTRAMUSCULAR; INTRAVENOUS at 08:12

## 2020-12-08 RX ADMIN — NEOSTIGMINE METHYLSULFATE 3 MG: 1 INJECTION INTRAVENOUS at 09:12

## 2020-12-08 RX ADMIN — ONDANSETRON 4 MG: 2 INJECTION, SOLUTION INTRAMUSCULAR; INTRAVENOUS at 09:12

## 2020-12-08 RX ADMIN — LIDOCAINE HYDROCHLORIDE 35 MG: 20 INJECTION, SOLUTION INTRAVENOUS at 08:12

## 2020-12-08 RX ADMIN — ROCURONIUM BROMIDE 30 MG: 10 INJECTION, SOLUTION INTRAVENOUS at 08:12

## 2020-12-08 RX ADMIN — ONDANSETRON 4 MG: 2 INJECTION INTRAMUSCULAR; INTRAVENOUS at 10:12

## 2020-12-08 NOTE — OP NOTE
[facility]  Surgery Department  Operative Note    SUMMARY     Patient: Cecilia Munguia    Medical Record: [id number]    Date of Procedure: 12/8/2020     Surgeon: Surgeon(s) and Role:     * Jeff Roman MD - Primary    Assisting Surgeon: None    Pre-Operative Diagnosis: Disease of gallbladder [K82.9]    Post-Operative Diagnosis: Post-Op Diagnosis Codes:     * Disease of gallbladder [K82.9]    Procedure: Procedure(s) (LRB):  CHOLECYSTECTOMY, LAPAROSCOPIC (N/A)    Procedure in Detail:  The patient was taken to the operating room and placed on the table in the supine position.  After smooth induction with general anesthesia the abdomen was prepped and draped in the usual sterile fashion.  A subumbilical incision was made and Veress needle placed.  The abdomen was easily insufflated CO2 to 12 mm of mercury.  He was advanced and the scope placed.  Inspection revealed no injury to the underlying.  Three additional 5 mm cannulas were placed in the right subcostal area.  The gallbladder was grasped and retracted superiorly.  Dissection over the lower portion of the gallbladder revealed a normal-appearing cystic duct and artery which were ligated using the Endo clipped and divided.  The gallbladder was then taken down using electric cautery.  The gallbladder was grasped placed in an endobag and removed through the 10 mm port site.  The liver bed was inspected and no bleeding noted.  The area was irrigated and the fluid aspirated.  The 10 mm cannula was then removed.  Under direct vision the fascia was reapproximated using 0 Vicryl.  The 5 mm cannulas were then removed.  The CO2 was allowed to escape.  The wounds were closed using 4 O Vicryl followed by glue and Steri-Strips.  Blood loss was minimal.  Patient tolerated the procedure and left the operating stable condition.

## 2020-12-08 NOTE — TRANSFER OF CARE
"Anesthesia Transfer of Care Note    Patient: Cecilia Munguia    Procedure(s) Performed: Procedure(s) (LRB):  CHOLECYSTECTOMY, LAPAROSCOPIC (N/A)    Patient location: PACU    Anesthesia Type: general    Transport from OR: Transported from OR on 2-3 L/min O2 by NC with adequate spontaneous ventilation    Post pain: adequate analgesia    Post assessment: no apparent anesthetic complications    Post vital signs: stable    Level of consciousness: awake    Nausea/Vomiting: no nausea/vomiting    Complications: none    Transfer of care protocol was followed      Last vitals:   Visit Vitals  /60 (BP Location: Right arm, Patient Position: Lying)   Pulse (!) 58   Temp 36.3 °C (97.3 °F) (Temporal)   Resp 16   Ht 5' 6" (1.676 m)   Wt 63.5 kg (140 lb)   SpO2 100%   Breastfeeding No   BMI 22.60 kg/m²     "

## 2020-12-08 NOTE — PLAN OF CARE
Cecilia ELIDA Munguia has met all discharge criteria from Phase II. Vital Signs are stable, ambulating  without difficulty. Discharge instructions given, patient verbalized understanding. Discharged from facility via wheelchair in stable condition.

## 2020-12-08 NOTE — INTERVAL H&P NOTE
The patient has been examined and the H&P has been reviewed:    I concur with the findings and no changes have occurred since H&P was written.    Anesthesia/Surgery risks, benefits and alternative options discussed and understood by patient/family.          Active Hospital Problems    Diagnosis  POA    Disease of gallbladder [K82.9]  Yes      Resolved Hospital Problems   No resolved problems to display.

## 2020-12-08 NOTE — DISCHARGE INSTRUCTIONS
PATIENT INSTRUCTIONS  GALL BLADDER SURGERY  (CHOLECYSTECTOMY)    FOLLOW-UP:  Please make an appointment with your physician in 2weeks.  Call your physician immediately if you have any fevers greater than 102.5, drainage from your wound that is not clear or looks infected, persistent bleeding, increasing abdominal pain, problems urinating, or persistent nausea/vomiting.  You should be aware that you may have right shoulder pain after surgery and that this will progressively go away.  This is called 'referred pain' and is from the area of the gallbladder.  It can also be caused by gas that may be trapped under the diaphragm from the surgery, especially if it was performed laparoscopically through mini-incisions.  This gas will progressively get reabsorbed by your body.     WOUND CARE INSTRUCTIONS:  Keep a dry clean dressing on the wound if there is drainage. The initial bandage may be removed after 24 hours.  Once the wound has quit draining you may leave it open to air.  If clothing rubs against the wound or causes irritation and the wound is not draining you may cover it with a dry dressing during the daytime.  Try to keep the wound dry and avoid ointments on the wound unless directed to do so.  If the wound becomes bright red and painful or starts to drain infected material that is not clear, please contact your physician immediately.   You should also call if you begin to drain fluid that is thin and greenish-brown from the wound and appears to look like bile.  If the wound though is mildly pink and has a thick firm ridge underneath it, this is normal, and is referred to as a healing ridge.  This will resolve over the next 4-6 weeks.    DIET:  You may eat any foods that you can tolerate.  It is a good idea to eat a high fiber diet and take in plenty of fluids to prevent constipation.  If you do become constipated you may want to take a mild laxative or take ducolax tablets on a daily basis until your bowel habits  are regular.  Constipation can be very uncomfortable, along with straining, after recent abdominal surgery.    ACTIVITY:  You are encouraged to cough and deep breath or use your incentive spirometer if you were given one, every 15-30 minutes when awake.  This will help prevent respiratory complications and low grade fevers post-operatively.  You may want to hug a pillow when coughing and sneezing to add additional support to the surgical area(s) which will decrease pain during these times.  You are encouraged to walk and engage in light activity for the next two weeks.  You should not lift more than 20 pounds during this time frame as it could put you at increased risk for a post-operative hernia.  Twenty pounds is roughly equivalent to a plastic bag of groceries.      MEDICATIONS:  Try to take narcotic medications and anti-inflammatory medications, such as tylenol, ibuprofen, naprosyn, etc., with food.  This will minimize stomach upset from the medication.  Should you develop nausea and vomiting from the pain medication, or develop a rash, please discontinue the medication and contact your physician.  You should not drive, make important decisions, or operate machinery when taking narcotic pain medication.    QUESTIONS:  Please feel free to call your physician or the hospital  if you have any questions, and they will be glad to assist you.

## 2020-12-08 NOTE — BRIEF OP NOTE
[facility]  Brief Operative Note    Surgery Date: 12/8/2020     Surgeon(s) and Role:     * Jeff Roman MD - Primary    Assisting Surgeon: None    Pre-op Diagnosis:  Disease of gallbladder [K82.9]    Post-op Diagnosis:  Post-Op Diagnosis Codes:     * Disease of gallbladder [K82.9]    Procedure(s) (LRB):  CHOLECYSTECTOMY, LAPAROSCOPIC (N/A)    Anesthesia: General    Description of the findings of the procedure(s): Chronic prudencio    Estimated Blood Loss: * No values recorded between 12/8/2020  9:06 AM and 12/8/2020  9:41 AM *min         Specimens:   Specimen (12h ago, onward)    None            Discharge Note    OUTCOME: Patient tolerated treatment/procedure well without complication and is now ready for discharge.    DISPOSITION: Home or Self Care    FINAL DIAGNOSIS:  Disease of gallbladder    FOLLOWUP: In clinic    DISCHARGE INSTRUCTIONS:    Discharge Procedure Orders   COVID-19 Routine Screening   Standing Status: Future Number of Occurrences: 1 Standing Exp. Date: 02/01/22   Order Comments: STAT FOR SURGERY 12.8     Order Specific Question Answer Comments   Is the patient symptomatic? No    Is this needed for pre-procedure or pre-op testing? Yes    Diagnosis: Preop testing [590951]      Diet general     Call MD for:  redness, tenderness, or signs of infection (pain, swelling, redness, odor or green/yellow discharge around incision site)     Remove dressing in 48 hours     Shower on day dressing removed (No bath)   Order Comments: You may shower on the 2nd day following your surgery.     Activity as tolerated

## 2020-12-08 NOTE — ANESTHESIA PROCEDURE NOTES
Intubation  Performed by: Lisa Leigh CRNA  Authorized by: Luis Armando Goncalves MD     Intubation:     Induction:  Intravenous    Intubated:  Postinduction    Mask Ventilation:  Easy mask    Attempts:  2    Attempted By:  CRNA    Method of Intubation:  Video laryngoscopy    Blade:  Fraser 3    Laryngeal View Grade: Grade IIA - cords partially seen      Method of Intubation (2nd Attempt):  Video laryngoscopy    Blade (2nd Attempt):  Fraser 3    Difficult Airway Encountered?: No      Complications:  None    Airway Device:  Oral endotracheal tube    Airway Device Size:  7.0    Style/Cuff Inflation:  Cuffed    Inflation Amount (mL):  3    Tube secured:  20    Secured at:  The lips    Placement Verified By:  Capnometry    Complicating Factors:  Poor neck/head extension (Due to hx of RA, did not extend head.)    Findings Post-Intubation:  BS equal bilateral

## 2020-12-08 NOTE — ANESTHESIA POSTPROCEDURE EVALUATION
Anesthesia Post Evaluation    Patient: Cecilia Munguia    Procedure(s) Performed: Procedure(s) (LRB):  CHOLECYSTECTOMY, LAPAROSCOPIC (N/A)    Final Anesthesia Type: general    Patient location during evaluation: PACU  Patient participation: Yes- Able to Participate  Level of consciousness: awake and alert and oriented  Post-procedure vital signs: reviewed and stable  Pain management: adequate  Airway patency: patent    PONV status at discharge: No PONV  Anesthetic complications: no      Cardiovascular status: blood pressure returned to baseline and hemodynamically stable  Respiratory status: unassisted, spontaneous ventilation and room air  Hydration status: euvolemic  Follow-up not needed.          Vitals Value Taken Time   /68 12/08/20 1051   Temp 36.6 °C (97.8 °F) 12/08/20 1035   Pulse 48 12/08/20 1052   Resp 16 12/08/20 1050   SpO2 97 % 12/08/20 1052   Vitals shown include unvalidated device data.      No case tracking events are documented in the log.      Pain/Tiffany Score: Tiffany Score: 9 (12/8/2020 10:48 AM)

## 2020-12-11 ENCOUNTER — PATIENT MESSAGE (OUTPATIENT)
Dept: OTHER | Facility: OTHER | Age: 75
End: 2020-12-11

## 2020-12-14 LAB
FINAL PATHOLOGIC DIAGNOSIS: NORMAL
GROSS: NORMAL
Lab: NORMAL

## 2020-12-31 ENCOUNTER — PATIENT OUTREACH (OUTPATIENT)
Dept: ADMINISTRATIVE | Facility: OTHER | Age: 75
End: 2020-12-31

## 2021-01-07 ENCOUNTER — LAB VISIT (OUTPATIENT)
Dept: LAB | Facility: OTHER | Age: 76
End: 2021-01-07
Attending: PSYCHIATRY & NEUROLOGY
Payer: MEDICARE

## 2021-01-07 ENCOUNTER — OFFICE VISIT (OUTPATIENT)
Dept: NEUROLOGY | Facility: CLINIC | Age: 76
End: 2021-01-07
Payer: MEDICARE

## 2021-01-07 VITALS
HEIGHT: 66 IN | SYSTOLIC BLOOD PRESSURE: 113 MMHG | BODY MASS INDEX: 22.53 KG/M2 | DIASTOLIC BLOOD PRESSURE: 73 MMHG | WEIGHT: 140.19 LBS | HEART RATE: 66 BPM

## 2021-01-07 DIAGNOSIS — E55.9 VITAMIN D DEFICIENCY, UNSPECIFIED: ICD-10-CM

## 2021-01-07 DIAGNOSIS — G60.3 IDIOPATHIC PROGRESSIVE POLYNEUROPATHY: Primary | ICD-10-CM

## 2021-01-07 DIAGNOSIS — R73.03 PREDIABETES: ICD-10-CM

## 2021-01-07 DIAGNOSIS — G60.3 IDIOPATHIC PROGRESSIVE POLYNEUROPATHY: ICD-10-CM

## 2021-01-07 LAB
ALBUMIN SERPL BCP-MCNC: 4.1 G/DL (ref 3.5–5.2)
ALP SERPL-CCNC: 96 U/L (ref 55–135)
ALT SERPL W/O P-5'-P-CCNC: 10 U/L (ref 10–44)
ANION GAP SERPL CALC-SCNC: 10 MMOL/L (ref 8–16)
AST SERPL-CCNC: 18 U/L (ref 10–40)
BASOPHILS # BLD AUTO: 0.02 K/UL (ref 0–0.2)
BASOPHILS NFR BLD: 0.4 % (ref 0–1.9)
BILIRUB SERPL-MCNC: 0.6 MG/DL (ref 0.1–1)
BUN SERPL-MCNC: 10 MG/DL (ref 8–23)
CALCIUM SERPL-MCNC: 9.3 MG/DL (ref 8.7–10.5)
CHLORIDE SERPL-SCNC: 102 MMOL/L (ref 95–110)
CO2 SERPL-SCNC: 30 MMOL/L (ref 23–29)
CREAT SERPL-MCNC: 0.7 MG/DL (ref 0.5–1.4)
DIFFERENTIAL METHOD: NORMAL
EOSINOPHIL # BLD AUTO: 0.2 K/UL (ref 0–0.5)
EOSINOPHIL NFR BLD: 3.7 % (ref 0–8)
ERYTHROCYTE [DISTWIDTH] IN BLOOD BY AUTOMATED COUNT: 13.2 % (ref 11.5–14.5)
EST. GFR  (AFRICAN AMERICAN): >60 ML/MIN/1.73 M^2
EST. GFR  (NON AFRICAN AMERICAN): >60 ML/MIN/1.73 M^2
ESTIMATED AVG GLUCOSE: 91 MG/DL (ref 68–131)
GLUCOSE SERPL-MCNC: 94 MG/DL (ref 70–110)
HBA1C MFR BLD HPLC: 4.8 % (ref 4–5.6)
HCT VFR BLD AUTO: 42.5 % (ref 37–48.5)
HGB BLD-MCNC: 14.1 G/DL (ref 12–16)
IMM GRANULOCYTES # BLD AUTO: 0.01 K/UL (ref 0–0.04)
IMM GRANULOCYTES NFR BLD AUTO: 0.2 % (ref 0–0.5)
LYMPHOCYTES # BLD AUTO: 1.5 K/UL (ref 1–4.8)
LYMPHOCYTES NFR BLD: 31.2 % (ref 18–48)
MCH RBC QN AUTO: 30.6 PG (ref 27–31)
MCHC RBC AUTO-ENTMCNC: 33.2 G/DL (ref 32–36)
MCV RBC AUTO: 92 FL (ref 82–98)
MONOCYTES # BLD AUTO: 0.4 K/UL (ref 0.3–1)
MONOCYTES NFR BLD: 7.8 % (ref 4–15)
NEUTROPHILS # BLD AUTO: 2.8 K/UL (ref 1.8–7.7)
NEUTROPHILS NFR BLD: 56.7 % (ref 38–73)
NRBC BLD-RTO: 0 /100 WBC
PLATELET # BLD AUTO: 248 K/UL (ref 150–350)
PMV BLD AUTO: 9.4 FL (ref 9.2–12.9)
POTASSIUM SERPL-SCNC: 3.5 MMOL/L (ref 3.5–5.1)
PROT SERPL-MCNC: 7.1 G/DL (ref 6–8.4)
RBC # BLD AUTO: 4.61 M/UL (ref 4–5.4)
SODIUM SERPL-SCNC: 142 MMOL/L (ref 136–145)
WBC # BLD AUTO: 4.9 K/UL (ref 3.9–12.7)

## 2021-01-07 PROCEDURE — 84165 PROTEIN E-PHORESIS SERUM: CPT | Mod: 26,,, | Performed by: PATHOLOGY

## 2021-01-07 PROCEDURE — 84425 ASSAY OF VITAMIN B-1: CPT

## 2021-01-07 PROCEDURE — 84165 PATHOLOGIST INTERPRETATION SPE: ICD-10-PCS | Mod: 26,,, | Performed by: PATHOLOGY

## 2021-01-07 PROCEDURE — 80053 COMPREHEN METABOLIC PANEL: CPT

## 2021-01-07 PROCEDURE — 99999 PR PBB SHADOW E&M-EST. PATIENT-LVL V: ICD-10-PCS | Mod: PBBFAC,,, | Performed by: PSYCHIATRY & NEUROLOGY

## 2021-01-07 PROCEDURE — 99204 OFFICE O/P NEW MOD 45 MIN: CPT | Mod: S$PBB,,, | Performed by: PSYCHIATRY & NEUROLOGY

## 2021-01-07 PROCEDURE — 85025 COMPLETE CBC W/AUTO DIFF WBC: CPT

## 2021-01-07 PROCEDURE — 84207 ASSAY OF VITAMIN B-6: CPT

## 2021-01-07 PROCEDURE — 99215 OFFICE O/P EST HI 40 MIN: CPT | Mod: PBBFAC | Performed by: PSYCHIATRY & NEUROLOGY

## 2021-01-07 PROCEDURE — 83036 HEMOGLOBIN GLYCOSYLATED A1C: CPT

## 2021-01-07 PROCEDURE — 82746 ASSAY OF FOLIC ACID SERUM: CPT

## 2021-01-07 PROCEDURE — 84165 PROTEIN E-PHORESIS SERUM: CPT

## 2021-01-07 PROCEDURE — 99999 PR PBB SHADOW E&M-EST. PATIENT-LVL V: CPT | Mod: PBBFAC,,, | Performed by: PSYCHIATRY & NEUROLOGY

## 2021-01-07 PROCEDURE — 83520 IMMUNOASSAY QUANT NOS NONAB: CPT

## 2021-01-07 PROCEDURE — 83921 ORGANIC ACID SINGLE QUANT: CPT

## 2021-01-07 PROCEDURE — 99204 PR OFFICE/OUTPT VISIT, NEW, LEVL IV, 45-59 MIN: ICD-10-PCS | Mod: S$PBB,,, | Performed by: PSYCHIATRY & NEUROLOGY

## 2021-01-07 PROCEDURE — 84446 ASSAY OF VITAMIN E: CPT

## 2021-01-07 PROCEDURE — 36415 COLL VENOUS BLD VENIPUNCTURE: CPT

## 2021-01-07 PROCEDURE — 82306 VITAMIN D 25 HYDROXY: CPT

## 2021-01-08 LAB
25(OH)D3+25(OH)D2 SERPL-MCNC: 17 NG/ML (ref 30–96)
ALBUMIN SERPL ELPH-MCNC: 4.13 G/DL (ref 3.35–5.55)
ALPHA1 GLOB SERPL ELPH-MCNC: 0.28 G/DL (ref 0.17–0.41)
ALPHA2 GLOB SERPL ELPH-MCNC: 0.79 G/DL (ref 0.43–0.99)
B-GLOBULIN SERPL ELPH-MCNC: 0.73 G/DL (ref 0.5–1.1)
FOLATE SERPL-MCNC: 10.7 NG/ML (ref 4–24)
GAMMA GLOB SERPL ELPH-MCNC: 0.68 G/DL (ref 0.67–1.58)
KAPPA LC SER QL IA: 1.22 MG/DL (ref 0.33–1.94)
KAPPA LC/LAMBDA SER IA: 1.37 (ref 0.26–1.65)
LAMBDA LC SER QL IA: 0.89 MG/DL (ref 0.57–2.63)
PATHOLOGIST INTERPRETATION SPE: NORMAL
PROT SERPL-MCNC: 6.6 G/DL (ref 6–8.4)

## 2021-01-09 LAB — VIT B12 SERPL-MCNC: 208 NG/L (ref 180–914)

## 2021-01-11 ENCOUNTER — TELEPHONE (OUTPATIENT)
Dept: NEUROLOGY | Facility: CLINIC | Age: 76
End: 2021-01-11

## 2021-01-12 ENCOUNTER — TELEPHONE (OUTPATIENT)
Dept: NEUROLOGY | Facility: CLINIC | Age: 76
End: 2021-01-12

## 2021-01-12 ENCOUNTER — IMMUNIZATION (OUTPATIENT)
Dept: PHARMACY | Facility: CLINIC | Age: 76
End: 2021-01-12
Payer: MEDICARE

## 2021-01-12 DIAGNOSIS — Z23 NEED FOR VACCINATION: ICD-10-CM

## 2021-01-12 LAB
PYRIDOXAL SERPL-MCNC: 21 UG/L (ref 5–50)
VIT B1 BLD-MCNC: 54 UG/L (ref 38–122)

## 2021-01-12 RX ORDER — ERGOCALCIFEROL 1.25 MG/1
50000 CAPSULE ORAL
Qty: 12 CAPSULE | Refills: 4 | Status: SHIPPED | OUTPATIENT
Start: 2021-01-12 | End: 2021-03-31

## 2021-01-13 LAB
A-TOCOPHEROL VIT E SERPL-MCNC: 2075 UG/DL (ref 500–1800)
METHYLMALONATE SERPL-SCNC: 0.17 NMOL/ML

## 2021-01-26 ENCOUNTER — PATIENT MESSAGE (OUTPATIENT)
Dept: NEUROLOGY | Facility: CLINIC | Age: 76
End: 2021-01-26

## 2021-02-04 ENCOUNTER — LAB VISIT (OUTPATIENT)
Dept: LAB | Facility: OTHER | Age: 76
End: 2021-02-04
Attending: INTERNAL MEDICINE
Payer: MEDICARE

## 2021-02-04 ENCOUNTER — OFFICE VISIT (OUTPATIENT)
Dept: INTERNAL MEDICINE | Facility: CLINIC | Age: 76
End: 2021-02-04
Payer: MEDICARE

## 2021-02-04 VITALS
BODY MASS INDEX: 22.78 KG/M2 | HEART RATE: 64 BPM | WEIGHT: 141.75 LBS | HEIGHT: 66 IN | OXYGEN SATURATION: 97 % | DIASTOLIC BLOOD PRESSURE: 84 MMHG | SYSTOLIC BLOOD PRESSURE: 112 MMHG

## 2021-02-04 DIAGNOSIS — K21.9 GASTROESOPHAGEAL REFLUX DISEASE WITHOUT ESOPHAGITIS: ICD-10-CM

## 2021-02-04 DIAGNOSIS — E78.2 MIXED HYPERLIPIDEMIA: Chronic | ICD-10-CM

## 2021-02-04 DIAGNOSIS — N39.46 URINARY INCONTINENCE, MIXED: ICD-10-CM

## 2021-02-04 DIAGNOSIS — F41.1 GENERALIZED ANXIETY DISORDER: ICD-10-CM

## 2021-02-04 DIAGNOSIS — M85.89 OSTEOPENIA OF MULTIPLE SITES: ICD-10-CM

## 2021-02-04 DIAGNOSIS — I10 ESSENTIAL HYPERTENSION: Chronic | ICD-10-CM

## 2021-02-04 DIAGNOSIS — G61.81 CHRONIC INFLAMMATORY DEMYELINATING POLYNEUROPATHY: Primary | Chronic | ICD-10-CM

## 2021-02-04 LAB
CHOLEST SERPL-MCNC: 259 MG/DL (ref 120–199)
CHOLEST/HDLC SERPL: 2.9 {RATIO} (ref 2–5)
HDLC SERPL-MCNC: 90 MG/DL (ref 40–75)
HDLC SERPL: 34.7 % (ref 20–50)
LDLC SERPL CALC-MCNC: 147.6 MG/DL (ref 63–159)
NONHDLC SERPL-MCNC: 169 MG/DL
TRIGL SERPL-MCNC: 107 MG/DL (ref 30–150)

## 2021-02-04 PROCEDURE — 99214 OFFICE O/P EST MOD 30 MIN: CPT | Mod: S$PBB,,, | Performed by: INTERNAL MEDICINE

## 2021-02-04 PROCEDURE — 99999 PR PBB SHADOW E&M-EST. PATIENT-LVL III: ICD-10-PCS | Mod: PBBFAC,,, | Performed by: INTERNAL MEDICINE

## 2021-02-04 PROCEDURE — 36415 COLL VENOUS BLD VENIPUNCTURE: CPT

## 2021-02-04 PROCEDURE — 99214 PR OFFICE/OUTPT VISIT, EST, LEVL IV, 30-39 MIN: ICD-10-PCS | Mod: S$PBB,,, | Performed by: INTERNAL MEDICINE

## 2021-02-04 PROCEDURE — 80061 LIPID PANEL: CPT

## 2021-02-04 PROCEDURE — 99213 OFFICE O/P EST LOW 20 MIN: CPT | Mod: PBBFAC | Performed by: INTERNAL MEDICINE

## 2021-02-04 PROCEDURE — 99999 PR PBB SHADOW E&M-EST. PATIENT-LVL III: CPT | Mod: PBBFAC,,, | Performed by: INTERNAL MEDICINE

## 2021-02-04 RX ORDER — SERTRALINE HYDROCHLORIDE 50 MG/1
50 TABLET, FILM COATED ORAL DAILY
Qty: 90 TABLET | Refills: 3 | Status: SHIPPED | OUTPATIENT
Start: 2021-02-04 | End: 2022-01-23

## 2021-02-04 RX ORDER — MELOXICAM 15 MG/1
15 TABLET ORAL DAILY PRN
Qty: 30 TABLET | Refills: 0 | Status: SHIPPED | OUTPATIENT
Start: 2021-02-04 | End: 2021-04-29 | Stop reason: CLARIF

## 2021-02-05 ENCOUNTER — PATIENT MESSAGE (OUTPATIENT)
Dept: INTERNAL MEDICINE | Facility: CLINIC | Age: 76
End: 2021-02-05

## 2021-02-05 ENCOUNTER — PATIENT MESSAGE (OUTPATIENT)
Dept: NEUROLOGY | Facility: CLINIC | Age: 76
End: 2021-02-05

## 2021-02-05 DIAGNOSIS — E78.2 MIXED HYPERLIPIDEMIA: ICD-10-CM

## 2021-02-05 DIAGNOSIS — Z51.81 MEDICATION MONITORING ENCOUNTER: Primary | ICD-10-CM

## 2021-02-06 RX ORDER — ATORVASTATIN CALCIUM 20 MG/1
20 TABLET, FILM COATED ORAL DAILY
Qty: 90 TABLET | Refills: 3 | Status: SHIPPED | OUTPATIENT
Start: 2021-02-06 | End: 2021-04-29 | Stop reason: CLARIF

## 2021-02-07 ENCOUNTER — PATIENT OUTREACH (OUTPATIENT)
Dept: ADMINISTRATIVE | Facility: OTHER | Age: 76
End: 2021-02-07

## 2021-02-08 ENCOUNTER — OFFICE VISIT (OUTPATIENT)
Dept: PODIATRY | Facility: CLINIC | Age: 76
End: 2021-02-08
Payer: MEDICARE

## 2021-02-08 VITALS
BODY MASS INDEX: 22.78 KG/M2 | HEIGHT: 66 IN | WEIGHT: 141.75 LBS | SYSTOLIC BLOOD PRESSURE: 130 MMHG | HEART RATE: 60 BPM | DIASTOLIC BLOOD PRESSURE: 73 MMHG

## 2021-02-08 DIAGNOSIS — B35.1 DERMATOPHYTOSIS OF NAIL: ICD-10-CM

## 2021-02-08 DIAGNOSIS — M20.42 HAMMER TOES OF BOTH FEET: ICD-10-CM

## 2021-02-08 DIAGNOSIS — M20.41 HAMMER TOES OF BOTH FEET: ICD-10-CM

## 2021-02-08 DIAGNOSIS — G61.81 CHRONIC INFLAMMATORY DEMYELINATING POLYNEUROPATHY: Primary | ICD-10-CM

## 2021-02-08 PROCEDURE — 99203 PR OFFICE/OUTPT VISIT, NEW, LEVL III, 30-44 MIN: ICD-10-PCS | Mod: 25,S$PBB,, | Performed by: PODIATRIST

## 2021-02-08 PROCEDURE — 11721 ROUTINE FOOT CARE: ICD-10-PCS | Mod: Q9,S$PBB,, | Performed by: PODIATRIST

## 2021-02-08 PROCEDURE — 99999 PR PBB SHADOW E&M-EST. PATIENT-LVL III: CPT | Mod: PBBFAC,,, | Performed by: PODIATRIST

## 2021-02-08 PROCEDURE — 99203 OFFICE O/P NEW LOW 30 MIN: CPT | Mod: 25,S$PBB,, | Performed by: PODIATRIST

## 2021-02-08 PROCEDURE — 99213 OFFICE O/P EST LOW 20 MIN: CPT | Mod: PBBFAC,PN,25 | Performed by: PODIATRIST

## 2021-02-08 PROCEDURE — 99999 PR PBB SHADOW E&M-EST. PATIENT-LVL III: ICD-10-PCS | Mod: PBBFAC,,, | Performed by: PODIATRIST

## 2021-02-08 PROCEDURE — 11721 DEBRIDE NAIL 6 OR MORE: CPT | Mod: Q9,PBBFAC,PN | Performed by: PODIATRIST

## 2021-02-08 RX ORDER — UREA 200 MG/G
1 CREAM TOPICAL DAILY
Qty: 75 G | Refills: 10 | Status: SHIPPED | OUTPATIENT
Start: 2021-02-08 | End: 2021-04-29 | Stop reason: CLARIF

## 2021-02-09 ENCOUNTER — IMMUNIZATION (OUTPATIENT)
Dept: PHARMACY | Facility: CLINIC | Age: 76
End: 2021-02-09
Payer: COMMERCIAL

## 2021-02-09 DIAGNOSIS — Z23 NEED FOR VACCINATION: Primary | ICD-10-CM

## 2021-02-23 ENCOUNTER — OFFICE VISIT (OUTPATIENT)
Dept: PODIATRY | Facility: CLINIC | Age: 76
End: 2021-02-23
Payer: MEDICARE

## 2021-02-23 VITALS
BODY MASS INDEX: 22.78 KG/M2 | HEIGHT: 66 IN | SYSTOLIC BLOOD PRESSURE: 137 MMHG | HEART RATE: 60 BPM | WEIGHT: 141.75 LBS | DIASTOLIC BLOOD PRESSURE: 68 MMHG

## 2021-02-23 DIAGNOSIS — L84 PRE-ULCERATIVE CORN OR CALLOUS: ICD-10-CM

## 2021-02-23 DIAGNOSIS — G61.81 CHRONIC INFLAMMATORY DEMYELINATING POLYNEUROPATHY: ICD-10-CM

## 2021-02-23 DIAGNOSIS — M20.42 HAMMER TOES OF BOTH FEET: Primary | ICD-10-CM

## 2021-02-23 DIAGNOSIS — M20.41 HAMMER TOES OF BOTH FEET: Primary | ICD-10-CM

## 2021-02-23 PROCEDURE — 99999 PR PBB SHADOW E&M-EST. PATIENT-LVL III: CPT | Mod: PBBFAC,,, | Performed by: PODIATRIST

## 2021-02-23 PROCEDURE — 99213 OFFICE O/P EST LOW 20 MIN: CPT | Mod: S$PBB,,, | Performed by: PODIATRIST

## 2021-02-23 PROCEDURE — 99213 PR OFFICE/OUTPT VISIT, EST, LEVL III, 20-29 MIN: ICD-10-PCS | Mod: S$PBB,,, | Performed by: PODIATRIST

## 2021-02-23 PROCEDURE — 99999 PR PBB SHADOW E&M-EST. PATIENT-LVL III: ICD-10-PCS | Mod: PBBFAC,,, | Performed by: PODIATRIST

## 2021-02-23 PROCEDURE — 99213 OFFICE O/P EST LOW 20 MIN: CPT | Mod: PBBFAC,PN | Performed by: PODIATRIST

## 2021-03-18 ENCOUNTER — PATIENT MESSAGE (OUTPATIENT)
Dept: RESEARCH | Facility: HOSPITAL | Age: 76
End: 2021-03-18

## 2021-03-26 ENCOUNTER — PATIENT MESSAGE (OUTPATIENT)
Dept: RESEARCH | Facility: HOSPITAL | Age: 76
End: 2021-03-26

## 2021-04-05 ENCOUNTER — LAB VISIT (OUTPATIENT)
Dept: LAB | Facility: OTHER | Age: 76
End: 2021-04-05
Attending: INTERNAL MEDICINE
Payer: MEDICARE

## 2021-04-05 DIAGNOSIS — Z51.81 MEDICATION MONITORING ENCOUNTER: ICD-10-CM

## 2021-04-05 DIAGNOSIS — E78.2 MIXED HYPERLIPIDEMIA: ICD-10-CM

## 2021-04-05 LAB
ALBUMIN SERPL BCP-MCNC: 3.8 G/DL (ref 3.5–5.2)
ALP SERPL-CCNC: 85 U/L (ref 55–135)
ALT SERPL W/O P-5'-P-CCNC: 10 U/L (ref 10–44)
AST SERPL-CCNC: 16 U/L (ref 10–40)
BILIRUB DIRECT SERPL-MCNC: 0.2 MG/DL (ref 0.1–0.3)
BILIRUB SERPL-MCNC: 0.7 MG/DL (ref 0.1–1)
CHOLEST SERPL-MCNC: 229 MG/DL (ref 120–199)
CHOLEST/HDLC SERPL: 2.6 {RATIO} (ref 2–5)
HDLC SERPL-MCNC: 88 MG/DL (ref 40–75)
HDLC SERPL: 38.4 % (ref 20–50)
LDLC SERPL CALC-MCNC: 123.6 MG/DL (ref 63–159)
NONHDLC SERPL-MCNC: 141 MG/DL
PROT SERPL-MCNC: 6.7 G/DL (ref 6–8.4)
TRIGL SERPL-MCNC: 87 MG/DL (ref 30–150)

## 2021-04-05 PROCEDURE — 36415 COLL VENOUS BLD VENIPUNCTURE: CPT | Performed by: INTERNAL MEDICINE

## 2021-04-05 PROCEDURE — 80076 HEPATIC FUNCTION PANEL: CPT | Performed by: INTERNAL MEDICINE

## 2021-04-05 PROCEDURE — 80061 LIPID PANEL: CPT | Performed by: INTERNAL MEDICINE

## 2021-04-07 ENCOUNTER — PATIENT OUTREACH (OUTPATIENT)
Dept: ADMINISTRATIVE | Facility: OTHER | Age: 76
End: 2021-04-07

## 2021-04-08 ENCOUNTER — OFFICE VISIT (OUTPATIENT)
Dept: NEUROLOGY | Facility: CLINIC | Age: 76
End: 2021-04-08
Payer: MEDICARE

## 2021-04-08 ENCOUNTER — LAB VISIT (OUTPATIENT)
Dept: LAB | Facility: OTHER | Age: 76
End: 2021-04-08
Attending: PSYCHIATRY & NEUROLOGY
Payer: MEDICARE

## 2021-04-08 VITALS
HEART RATE: 69 BPM | BODY MASS INDEX: 22.66 KG/M2 | DIASTOLIC BLOOD PRESSURE: 67 MMHG | SYSTOLIC BLOOD PRESSURE: 132 MMHG | WEIGHT: 141 LBS | HEIGHT: 66 IN

## 2021-04-08 DIAGNOSIS — E55.9 VITAMIN D DEFICIENCY, UNSPECIFIED: ICD-10-CM

## 2021-04-08 DIAGNOSIS — G61.81 CIDP (CHRONIC INFLAMMATORY DEMYELINATING POLYNEUROPATHY): Primary | ICD-10-CM

## 2021-04-08 DIAGNOSIS — G60.3 IDIOPATHIC PROGRESSIVE POLYNEUROPATHY: ICD-10-CM

## 2021-04-08 DIAGNOSIS — M54.17 LEFT LUMBOSACRAL RADICULOPATHY: ICD-10-CM

## 2021-04-08 DIAGNOSIS — E53.8 LOW SERUM VITAMIN B12: ICD-10-CM

## 2021-04-08 PROCEDURE — 82306 VITAMIN D 25 HYDROXY: CPT | Performed by: PSYCHIATRY & NEUROLOGY

## 2021-04-08 PROCEDURE — 99215 PR OFFICE/OUTPT VISIT, EST, LEVL V, 40-54 MIN: ICD-10-PCS | Mod: S$PBB,,, | Performed by: PSYCHIATRY & NEUROLOGY

## 2021-04-08 PROCEDURE — 99999 PR PBB SHADOW E&M-EST. PATIENT-LVL III: CPT | Mod: PBBFAC,,, | Performed by: PSYCHIATRY & NEUROLOGY

## 2021-04-08 PROCEDURE — 36415 COLL VENOUS BLD VENIPUNCTURE: CPT | Performed by: PSYCHIATRY & NEUROLOGY

## 2021-04-08 PROCEDURE — 99215 OFFICE O/P EST HI 40 MIN: CPT | Mod: S$PBB,,, | Performed by: PSYCHIATRY & NEUROLOGY

## 2021-04-08 PROCEDURE — 99213 OFFICE O/P EST LOW 20 MIN: CPT | Mod: PBBFAC | Performed by: PSYCHIATRY & NEUROLOGY

## 2021-04-08 PROCEDURE — 99999 PR PBB SHADOW E&M-EST. PATIENT-LVL III: ICD-10-PCS | Mod: PBBFAC,,, | Performed by: PSYCHIATRY & NEUROLOGY

## 2021-04-08 RX ORDER — METHYLPREDNISOLONE 4 MG/1
TABLET ORAL
Qty: 1 PACKAGE | Refills: 0 | Status: SHIPPED | OUTPATIENT
Start: 2021-04-08 | End: 2021-04-29 | Stop reason: CLARIF

## 2021-04-09 LAB — 25(OH)D3+25(OH)D2 SERPL-MCNC: 29 NG/ML (ref 30–96)

## 2021-04-12 ENCOUNTER — PATIENT MESSAGE (OUTPATIENT)
Dept: NEUROLOGY | Facility: CLINIC | Age: 76
End: 2021-04-12

## 2021-04-15 ENCOUNTER — PATIENT MESSAGE (OUTPATIENT)
Dept: INTERNAL MEDICINE | Facility: CLINIC | Age: 76
End: 2021-04-15

## 2021-04-16 ENCOUNTER — PATIENT MESSAGE (OUTPATIENT)
Dept: NEUROLOGY | Facility: CLINIC | Age: 76
End: 2021-04-16

## 2021-04-16 ENCOUNTER — OFFICE VISIT (OUTPATIENT)
Dept: INTERNAL MEDICINE | Facility: CLINIC | Age: 76
End: 2021-04-16
Payer: MEDICARE

## 2021-04-16 ENCOUNTER — PATIENT MESSAGE (OUTPATIENT)
Dept: INTERNAL MEDICINE | Facility: CLINIC | Age: 76
End: 2021-04-16

## 2021-04-16 DIAGNOSIS — B96.89 ACUTE BACTERIAL SINUSITIS: Primary | ICD-10-CM

## 2021-04-16 DIAGNOSIS — R05.9 COUGH: ICD-10-CM

## 2021-04-16 DIAGNOSIS — G61.81 CHRONIC INFLAMMATORY DEMYELINATING POLYNEUROPATHY: Chronic | ICD-10-CM

## 2021-04-16 DIAGNOSIS — J01.90 ACUTE BACTERIAL SINUSITIS: Primary | ICD-10-CM

## 2021-04-16 DIAGNOSIS — I10 ESSENTIAL HYPERTENSION: Chronic | ICD-10-CM

## 2021-04-16 PROBLEM — R04.0 EPISTAXIS: Status: RESOLVED | Noted: 2019-09-06 | Resolved: 2021-04-16

## 2021-04-16 PROCEDURE — 99214 PR OFFICE/OUTPT VISIT, EST, LEVL IV, 30-39 MIN: ICD-10-PCS | Mod: 95,,, | Performed by: INTERNAL MEDICINE

## 2021-04-16 PROCEDURE — 99214 OFFICE O/P EST MOD 30 MIN: CPT | Mod: 95,,, | Performed by: INTERNAL MEDICINE

## 2021-04-16 RX ORDER — SODIUM CHLORIDE 0.9 % (FLUSH) 0.9 %
10 SYRINGE (ML) INJECTION
Status: CANCELLED | OUTPATIENT
Start: 2021-04-19

## 2021-04-16 RX ORDER — BENZONATATE 100 MG/1
100 CAPSULE ORAL 3 TIMES DAILY PRN
Qty: 21 CAPSULE | Refills: 1 | Status: SHIPPED | OUTPATIENT
Start: 2021-04-16 | End: 2021-04-29 | Stop reason: CLARIF

## 2021-04-16 RX ORDER — DIPHENHYDRAMINE HYDROCHLORIDE 50 MG/ML
25 INJECTION INTRAMUSCULAR; INTRAVENOUS
Status: CANCELLED | OUTPATIENT
Start: 2021-04-19

## 2021-04-16 RX ORDER — ACETAMINOPHEN 500 MG
500 TABLET ORAL
Status: CANCELLED | OUTPATIENT
Start: 2021-04-19

## 2021-04-16 RX ORDER — HEPARIN 100 UNIT/ML
500 SYRINGE INTRAVENOUS
Status: CANCELLED | OUTPATIENT
Start: 2021-04-19

## 2021-04-16 RX ORDER — DOXYCYCLINE HYCLATE 100 MG
100 TABLET ORAL 2 TIMES DAILY
Qty: 14 TABLET | Refills: 0 | Status: SHIPPED | OUTPATIENT
Start: 2021-04-16 | End: 2021-04-29 | Stop reason: CLARIF

## 2021-04-16 RX ORDER — ERGOCALCIFEROL 1.25 MG/1
50000 CAPSULE ORAL WEEKLY
COMMUNITY
Start: 2021-04-02 | End: 2021-04-29 | Stop reason: CLARIF

## 2021-04-16 RX ORDER — PROMETHAZINE HYDROCHLORIDE AND CODEINE PHOSPHATE 6.25; 1 MG/5ML; MG/5ML
5 SOLUTION ORAL NIGHTLY PRN
Qty: 118 ML | Refills: 0 | Status: SHIPPED | OUTPATIENT
Start: 2021-04-16 | End: 2021-04-26

## 2021-04-16 RX ORDER — DIPHENHYDRAMINE HCL 25 MG
25 CAPSULE ORAL
Status: CANCELLED | OUTPATIENT
Start: 2021-04-19

## 2021-04-21 ENCOUNTER — PATIENT MESSAGE (OUTPATIENT)
Dept: INTERNAL MEDICINE | Facility: CLINIC | Age: 76
End: 2021-04-21

## 2021-04-22 ENCOUNTER — PATIENT MESSAGE (OUTPATIENT)
Dept: INTERNAL MEDICINE | Facility: CLINIC | Age: 76
End: 2021-04-22

## 2021-04-23 ENCOUNTER — PATIENT MESSAGE (OUTPATIENT)
Dept: INTERNAL MEDICINE | Facility: CLINIC | Age: 76
End: 2021-04-23

## 2021-04-23 ENCOUNTER — LAB VISIT (OUTPATIENT)
Dept: LAB | Facility: OTHER | Age: 76
End: 2021-04-23
Attending: PHYSICIAN ASSISTANT
Payer: MEDICARE

## 2021-04-23 ENCOUNTER — OFFICE VISIT (OUTPATIENT)
Dept: INTERNAL MEDICINE | Facility: CLINIC | Age: 76
End: 2021-04-23
Payer: MEDICARE

## 2021-04-23 VITALS
SYSTOLIC BLOOD PRESSURE: 128 MMHG | OXYGEN SATURATION: 99 % | WEIGHT: 142 LBS | BODY MASS INDEX: 22.82 KG/M2 | DIASTOLIC BLOOD PRESSURE: 76 MMHG | HEART RATE: 64 BPM | HEIGHT: 66 IN

## 2021-04-23 DIAGNOSIS — Z01.818 PRE-OPERATIVE CLEARANCE: ICD-10-CM

## 2021-04-23 DIAGNOSIS — M16.11 OSTEOARTHRITIS OF RIGHT HIP, UNSPECIFIED OSTEOARTHRITIS TYPE: ICD-10-CM

## 2021-04-23 DIAGNOSIS — E78.2 MIXED HYPERLIPIDEMIA: ICD-10-CM

## 2021-04-23 DIAGNOSIS — K21.9 GASTROESOPHAGEAL REFLUX DISEASE WITHOUT ESOPHAGITIS: ICD-10-CM

## 2021-04-23 DIAGNOSIS — I10 ESSENTIAL HYPERTENSION: ICD-10-CM

## 2021-04-23 DIAGNOSIS — F41.1 GENERALIZED ANXIETY DISORDER: ICD-10-CM

## 2021-04-23 DIAGNOSIS — G61.81 CHRONIC INFLAMMATORY DEMYELINATING POLYNEUROPATHY: ICD-10-CM

## 2021-04-23 DIAGNOSIS — J30.9 ALLERGIC RHINITIS, UNSPECIFIED SEASONALITY, UNSPECIFIED TRIGGER: ICD-10-CM

## 2021-04-23 DIAGNOSIS — Z01.818 PRE-OPERATIVE CLEARANCE: Primary | ICD-10-CM

## 2021-04-23 LAB
ALBUMIN SERPL BCP-MCNC: 3.7 G/DL (ref 3.5–5.2)
ALP SERPL-CCNC: 88 U/L (ref 55–135)
ALT SERPL W/O P-5'-P-CCNC: 11 U/L (ref 10–44)
ANION GAP SERPL CALC-SCNC: 13 MMOL/L (ref 8–16)
AST SERPL-CCNC: 15 U/L (ref 10–40)
BASOPHILS # BLD AUTO: 0.02 K/UL (ref 0–0.2)
BASOPHILS NFR BLD: 0.4 % (ref 0–1.9)
BILIRUB SERPL-MCNC: 0.7 MG/DL (ref 0.1–1)
BUN SERPL-MCNC: 12 MG/DL (ref 8–23)
CALCIUM SERPL-MCNC: 9.2 MG/DL (ref 8.7–10.5)
CHLORIDE SERPL-SCNC: 102 MMOL/L (ref 95–110)
CO2 SERPL-SCNC: 26 MMOL/L (ref 23–29)
CREAT SERPL-MCNC: 0.7 MG/DL (ref 0.5–1.4)
DIFFERENTIAL METHOD: ABNORMAL
EOSINOPHIL # BLD AUTO: 0.3 K/UL (ref 0–0.5)
EOSINOPHIL NFR BLD: 5.5 % (ref 0–8)
ERYTHROCYTE [DISTWIDTH] IN BLOOD BY AUTOMATED COUNT: 13.1 % (ref 11.5–14.5)
EST. GFR  (AFRICAN AMERICAN): >60 ML/MIN/1.73 M^2
EST. GFR  (NON AFRICAN AMERICAN): >60 ML/MIN/1.73 M^2
GLUCOSE SERPL-MCNC: 100 MG/DL (ref 70–110)
HCT VFR BLD AUTO: 43.8 % (ref 37–48.5)
HGB BLD-MCNC: 14.1 G/DL (ref 12–16)
IMM GRANULOCYTES # BLD AUTO: 0.01 K/UL (ref 0–0.04)
IMM GRANULOCYTES NFR BLD AUTO: 0.2 % (ref 0–0.5)
LYMPHOCYTES # BLD AUTO: 1.5 K/UL (ref 1–4.8)
LYMPHOCYTES NFR BLD: 30.6 % (ref 18–48)
MCH RBC QN AUTO: 30.1 PG (ref 27–31)
MCHC RBC AUTO-ENTMCNC: 32.2 G/DL (ref 32–36)
MCV RBC AUTO: 93 FL (ref 82–98)
MONOCYTES # BLD AUTO: 0.4 K/UL (ref 0.3–1)
MONOCYTES NFR BLD: 7.6 % (ref 4–15)
NEUTROPHILS # BLD AUTO: 2.7 K/UL (ref 1.8–7.7)
NEUTROPHILS NFR BLD: 55.7 % (ref 38–73)
NRBC BLD-RTO: 0 /100 WBC
PLATELET # BLD AUTO: 274 K/UL (ref 150–450)
PMV BLD AUTO: 9 FL (ref 9.2–12.9)
POTASSIUM SERPL-SCNC: 4.3 MMOL/L (ref 3.5–5.1)
PROT SERPL-MCNC: 6.8 G/DL (ref 6–8.4)
RBC # BLD AUTO: 4.69 M/UL (ref 4–5.4)
SODIUM SERPL-SCNC: 141 MMOL/L (ref 136–145)
WBC # BLD AUTO: 4.9 K/UL (ref 3.9–12.7)

## 2021-04-23 PROCEDURE — 99999 PR PBB SHADOW E&M-EST. PATIENT-LVL IV: ICD-10-PCS | Mod: PBBFAC,,, | Performed by: PHYSICIAN ASSISTANT

## 2021-04-23 PROCEDURE — 99213 OFFICE O/P EST LOW 20 MIN: CPT | Mod: S$PBB,,, | Performed by: PHYSICIAN ASSISTANT

## 2021-04-23 PROCEDURE — 99999 PR PBB SHADOW E&M-EST. PATIENT-LVL IV: CPT | Mod: PBBFAC,,, | Performed by: PHYSICIAN ASSISTANT

## 2021-04-23 PROCEDURE — 80053 COMPREHEN METABOLIC PANEL: CPT | Performed by: PHYSICIAN ASSISTANT

## 2021-04-23 PROCEDURE — 99213 PR OFFICE/OUTPT VISIT, EST, LEVL III, 20-29 MIN: ICD-10-PCS | Mod: S$PBB,,, | Performed by: PHYSICIAN ASSISTANT

## 2021-04-23 PROCEDURE — 85025 COMPLETE CBC W/AUTO DIFF WBC: CPT | Performed by: PHYSICIAN ASSISTANT

## 2021-04-23 PROCEDURE — 99214 OFFICE O/P EST MOD 30 MIN: CPT | Mod: PBBFAC | Performed by: PHYSICIAN ASSISTANT

## 2021-04-23 PROCEDURE — 36415 COLL VENOUS BLD VENIPUNCTURE: CPT | Performed by: PHYSICIAN ASSISTANT

## 2021-04-29 ENCOUNTER — HOSPITAL ENCOUNTER (OUTPATIENT)
Dept: PREADMISSION TESTING | Facility: OTHER | Age: 76
Discharge: HOME | End: 2021-04-29
Attending: ORTHOPAEDIC SURGERY
Payer: MEDICARE

## 2021-04-29 ENCOUNTER — ANESTHESIA EVENT (OUTPATIENT)
Dept: SURGERY | Facility: OTHER | Age: 76
End: 2021-04-29
Payer: MEDICARE

## 2021-04-29 VITALS
HEIGHT: 66 IN | TEMPERATURE: 98 F | DIASTOLIC BLOOD PRESSURE: 75 MMHG | HEART RATE: 64 BPM | BODY MASS INDEX: 22.5 KG/M2 | WEIGHT: 140 LBS | OXYGEN SATURATION: 96 % | SYSTOLIC BLOOD PRESSURE: 140 MMHG

## 2021-04-29 DIAGNOSIS — Z01.818 PREOP TESTING: Primary | ICD-10-CM

## 2021-04-29 LAB
ABO + RH BLD: NORMAL
BACTERIA #/AREA URNS HPF: ABNORMAL /HPF
BILIRUB UR QL STRIP: NEGATIVE
BLD GP AB SCN CELLS X3 SERPL QL: NORMAL
CLARITY UR: CLEAR
COLOR UR: YELLOW
GLUCOSE UR QL STRIP: NEGATIVE
HGB UR QL STRIP: ABNORMAL
KETONES UR QL STRIP: NEGATIVE
LEUKOCYTE ESTERASE UR QL STRIP: NEGATIVE
MICROSCOPIC COMMENT: ABNORMAL
NITRITE UR QL STRIP: NEGATIVE
PH UR STRIP: 6 [PH] (ref 5–8)
PROT UR QL STRIP: NEGATIVE
RBC #/AREA URNS HPF: 8 /HPF (ref 0–4)
SP GR UR STRIP: 1.01 (ref 1–1.03)
SQUAMOUS #/AREA URNS HPF: 1 /HPF
URN SPEC COLLECT METH UR: ABNORMAL
UROBILINOGEN UR STRIP-ACNC: NEGATIVE EU/DL
WBC #/AREA URNS HPF: 1 /HPF (ref 0–5)

## 2021-04-29 PROCEDURE — 93005 ELECTROCARDIOGRAM TRACING: CPT

## 2021-04-29 PROCEDURE — 93010 ELECTROCARDIOGRAM REPORT: CPT | Mod: ,,, | Performed by: INTERNAL MEDICINE

## 2021-04-29 PROCEDURE — 36415 COLL VENOUS BLD VENIPUNCTURE: CPT | Performed by: ORTHOPAEDIC SURGERY

## 2021-04-29 PROCEDURE — 93010 EKG 12-LEAD: ICD-10-PCS | Mod: ,,, | Performed by: INTERNAL MEDICINE

## 2021-04-29 PROCEDURE — 86900 BLOOD TYPING SEROLOGIC ABO: CPT | Performed by: ORTHOPAEDIC SURGERY

## 2021-04-29 PROCEDURE — 81000 URINALYSIS NONAUTO W/SCOPE: CPT | Performed by: ORTHOPAEDIC SURGERY

## 2021-04-29 RX ORDER — LIDOCAINE HYDROCHLORIDE 10 MG/ML
0.5 INJECTION, SOLUTION EPIDURAL; INFILTRATION; INTRACAUDAL; PERINEURAL ONCE
Status: CANCELLED | OUTPATIENT
Start: 2021-04-29 | End: 2021-04-29

## 2021-04-29 RX ORDER — CHOLESTYRAMINE 4 G/9G
4 POWDER, FOR SUSPENSION ORAL DAILY
COMMUNITY
End: 2023-03-15

## 2021-04-29 RX ORDER — ERGOCALCIFEROL 1.25 MG/1
50000 CAPSULE ORAL
COMMUNITY
End: 2022-03-07 | Stop reason: SDUPTHER

## 2021-04-29 RX ORDER — ACETAMINOPHEN 500 MG
1000 TABLET ORAL
Status: CANCELLED | OUTPATIENT
Start: 2021-04-29 | End: 2021-04-29

## 2021-04-29 RX ORDER — SODIUM CHLORIDE, SODIUM LACTATE, POTASSIUM CHLORIDE, CALCIUM CHLORIDE 600; 310; 30; 20 MG/100ML; MG/100ML; MG/100ML; MG/100ML
INJECTION, SOLUTION INTRAVENOUS CONTINUOUS
Status: CANCELLED | OUTPATIENT
Start: 2021-04-29

## 2021-04-29 RX ORDER — MELOXICAM 15 MG/1
15 TABLET ORAL DAILY
Status: ON HOLD | COMMUNITY
End: 2021-05-10 | Stop reason: HOSPADM

## 2021-05-03 ENCOUNTER — TELEPHONE (OUTPATIENT)
Dept: INTERNAL MEDICINE | Facility: CLINIC | Age: 76
End: 2021-05-03

## 2021-05-06 RX ORDER — HUMAN IMMUNOGLOBULIN G 5 G/50ML
30 LIQUID INTRAVENOUS DAILY
Qty: 300 ML | Refills: 0 | Status: SHIPPED | OUTPATIENT
Start: 2021-05-07 | End: 2021-05-11

## 2021-05-07 ENCOUNTER — HOSPITAL ENCOUNTER (OUTPATIENT)
Dept: PREADMISSION TESTING | Facility: OTHER | Age: 76
Discharge: HOME | End: 2021-05-07
Attending: ORTHOPAEDIC SURGERY
Payer: MEDICARE

## 2021-05-07 DIAGNOSIS — Z01.818 PRE-OP TESTING: ICD-10-CM

## 2021-05-07 PROCEDURE — U0005 INFEC AGEN DETEC AMPLI PROBE: HCPCS | Performed by: SPECIALIST

## 2021-05-07 PROCEDURE — U0003 INFECTIOUS AGENT DETECTION BY NUCLEIC ACID (DNA OR RNA); SEVERE ACUTE RESPIRATORY SYNDROME CORONAVIRUS 2 (SARS-COV-2) (CORONAVIRUS DISEASE [COVID-19]), AMPLIFIED PROBE TECHNIQUE, MAKING USE OF HIGH THROUGHPUT TECHNOLOGIES AS DESCRIBED BY CMS-2020-01-R: HCPCS | Performed by: SPECIALIST

## 2021-05-08 LAB — SARS-COV-2 RNA RESP QL NAA+PROBE: NOT DETECTED

## 2021-05-10 ENCOUNTER — HOSPITAL ENCOUNTER (OUTPATIENT)
Facility: OTHER | Age: 76
Discharge: HOME HEALTH | End: 2021-05-11
Attending: ORTHOPAEDIC SURGERY | Admitting: ORTHOPAEDIC SURGERY
Payer: MEDICARE

## 2021-05-10 ENCOUNTER — ANESTHESIA (OUTPATIENT)
Dept: SURGERY | Facility: OTHER | Age: 76
End: 2021-05-10
Payer: MEDICARE

## 2021-05-10 DIAGNOSIS — M16.12 PRIMARY OSTEOARTHRITIS OF LEFT HIP: Primary | ICD-10-CM

## 2021-05-10 DIAGNOSIS — Z01.818 PRE-OP TESTING: ICD-10-CM

## 2021-05-10 PROCEDURE — 94799 UNLISTED PULMONARY SVC/PX: CPT

## 2021-05-10 PROCEDURE — C9290 INJ, BUPIVACAINE LIPOSOME: HCPCS | Performed by: ORTHOPAEDIC SURGERY

## 2021-05-10 PROCEDURE — 25000003 PHARM REV CODE 250

## 2021-05-10 PROCEDURE — 25000003 PHARM REV CODE 250: Performed by: ORTHOPAEDIC SURGERY

## 2021-05-10 PROCEDURE — 36000710: Performed by: ORTHOPAEDIC SURGERY

## 2021-05-10 PROCEDURE — 97530 THERAPEUTIC ACTIVITIES: CPT

## 2021-05-10 PROCEDURE — 37000009 HC ANESTHESIA EA ADD 15 MINS: Performed by: ORTHOPAEDIC SURGERY

## 2021-05-10 PROCEDURE — 63600175 PHARM REV CODE 636 W HCPCS: Performed by: ANESTHESIOLOGY

## 2021-05-10 PROCEDURE — 71000039 HC RECOVERY, EACH ADD'L HOUR: Performed by: ORTHOPAEDIC SURGERY

## 2021-05-10 PROCEDURE — 97162 PT EVAL MOD COMPLEX 30 MIN: CPT

## 2021-05-10 PROCEDURE — 36000711: Performed by: ORTHOPAEDIC SURGERY

## 2021-05-10 PROCEDURE — 63600175 PHARM REV CODE 636 W HCPCS: Performed by: ORTHOPAEDIC SURGERY

## 2021-05-10 PROCEDURE — 97110 THERAPEUTIC EXERCISES: CPT

## 2021-05-10 PROCEDURE — 27201423 OPTIME MED/SURG SUP & DEVICES STERILE SUPPLY: Performed by: ORTHOPAEDIC SURGERY

## 2021-05-10 PROCEDURE — 94761 N-INVAS EAR/PLS OXIMETRY MLT: CPT

## 2021-05-10 PROCEDURE — 25000003 PHARM REV CODE 250: Performed by: ANESTHESIOLOGY

## 2021-05-10 PROCEDURE — 71000033 HC RECOVERY, INTIAL HOUR: Performed by: ORTHOPAEDIC SURGERY

## 2021-05-10 PROCEDURE — C1776 JOINT DEVICE (IMPLANTABLE): HCPCS | Performed by: ORTHOPAEDIC SURGERY

## 2021-05-10 PROCEDURE — 63600175 PHARM REV CODE 636 W HCPCS: Performed by: NURSE ANESTHETIST, CERTIFIED REGISTERED

## 2021-05-10 PROCEDURE — 25000003 PHARM REV CODE 250: Performed by: NURSE ANESTHETIST, CERTIFIED REGISTERED

## 2021-05-10 PROCEDURE — 37000008 HC ANESTHESIA 1ST 15 MINUTES: Performed by: ORTHOPAEDIC SURGERY

## 2021-05-10 PROCEDURE — C1713 ANCHOR/SCREW BN/BN,TIS/BN: HCPCS | Performed by: ORTHOPAEDIC SURGERY

## 2021-05-10 DEVICE — LINER POLY 36MM: Type: IMPLANTABLE DEVICE | Site: HIP | Status: FUNCTIONAL

## 2021-05-10 DEVICE — IMPLANTABLE DEVICE: Type: IMPLANTABLE DEVICE | Site: HIP | Status: FUNCTIONAL

## 2021-05-10 DEVICE — SHELL TRILOGY ACET CLUS 50MM: Type: IMPLANTABLE DEVICE | Site: HIP | Status: FUNCTIONAL

## 2021-05-10 DEVICE — SCREW BONE SELF TAP 6.5X40: Type: IMPLANTABLE DEVICE | Site: HIP | Status: FUNCTIONAL

## 2021-05-10 DEVICE — CEMENT REFOBACIN BCR 1X40: Type: IMPLANTABLE DEVICE | Site: HIP | Status: FUNCTIONAL

## 2021-05-10 DEVICE — SCREW BONE 6.5X25 SELF-TAP: Type: IMPLANTABLE DEVICE | Site: HIP | Status: FUNCTIONAL

## 2021-05-10 RX ORDER — LIDOCAINE HYDROCHLORIDE 10 MG/ML
0.5 INJECTION, SOLUTION EPIDURAL; INFILTRATION; INTRACAUDAL; PERINEURAL ONCE
Status: DISCONTINUED | OUTPATIENT
Start: 2021-05-10 | End: 2021-05-10 | Stop reason: HOSPADM

## 2021-05-10 RX ORDER — PHENYLEPHRINE HYDROCHLORIDE 10 MG/ML
INJECTION INTRAVENOUS
Status: DISCONTINUED | OUTPATIENT
Start: 2021-05-10 | End: 2021-05-10

## 2021-05-10 RX ORDER — DEXAMETHASONE SODIUM PHOSPHATE 4 MG/ML
INJECTION, SOLUTION INTRA-ARTICULAR; INTRALESIONAL; INTRAMUSCULAR; INTRAVENOUS; SOFT TISSUE
Status: DISCONTINUED | OUTPATIENT
Start: 2021-05-10 | End: 2021-05-10

## 2021-05-10 RX ORDER — SIMETHICONE 80 MG
80 TABLET,CHEWABLE ORAL EVERY 6 HOURS PRN
Status: DISCONTINUED | OUTPATIENT
Start: 2021-05-10 | End: 2021-05-11 | Stop reason: HOSPADM

## 2021-05-10 RX ORDER — ONDANSETRON HYDROCHLORIDE 2 MG/ML
INJECTION, SOLUTION INTRAMUSCULAR; INTRAVENOUS
Status: DISCONTINUED | OUTPATIENT
Start: 2021-05-10 | End: 2021-05-10

## 2021-05-10 RX ORDER — EPINEPHRINE 1 MG/ML
INJECTION, SOLUTION INTRACARDIAC; INTRAMUSCULAR; INTRAVENOUS; SUBCUTANEOUS
Status: DISCONTINUED | OUTPATIENT
Start: 2021-05-10 | End: 2021-05-10 | Stop reason: HOSPADM

## 2021-05-10 RX ORDER — CEFAZOLIN SODIUM 1 G/3ML
2 INJECTION, POWDER, FOR SOLUTION INTRAMUSCULAR; INTRAVENOUS
Status: COMPLETED | OUTPATIENT
Start: 2021-05-10 | End: 2021-05-10

## 2021-05-10 RX ORDER — DOCUSATE SODIUM 100 MG/1
100 CAPSULE, LIQUID FILLED ORAL EVERY 12 HOURS
Status: DISCONTINUED | OUTPATIENT
Start: 2021-05-10 | End: 2021-05-11 | Stop reason: HOSPADM

## 2021-05-10 RX ORDER — DEXTROSE MONOHYDRATE AND SODIUM CHLORIDE 5; .9 G/100ML; G/100ML
INJECTION, SOLUTION INTRAVENOUS CONTINUOUS
Status: DISCONTINUED | OUTPATIENT
Start: 2021-05-10 | End: 2021-05-11 | Stop reason: HOSPADM

## 2021-05-10 RX ORDER — SODIUM CHLORIDE 0.9 % (FLUSH) 0.9 %
2 SYRINGE (ML) INJECTION EVERY 6 HOURS PRN
Status: DISCONTINUED | OUTPATIENT
Start: 2021-05-10 | End: 2021-05-11 | Stop reason: HOSPADM

## 2021-05-10 RX ORDER — ONDANSETRON 2 MG/ML
8 INJECTION INTRAMUSCULAR; INTRAVENOUS EVERY 8 HOURS PRN
Status: DISCONTINUED | OUTPATIENT
Start: 2021-05-10 | End: 2021-05-11 | Stop reason: HOSPADM

## 2021-05-10 RX ORDER — ONDANSETRON 2 MG/ML
4 INJECTION INTRAMUSCULAR; INTRAVENOUS DAILY PRN
Status: DISCONTINUED | OUTPATIENT
Start: 2021-05-10 | End: 2021-05-10 | Stop reason: HOSPADM

## 2021-05-10 RX ORDER — VANCOMYCIN HCL IN 5 % DEXTROSE 1G/250ML
15 PLASTIC BAG, INJECTION (ML) INTRAVENOUS ONCE
Status: COMPLETED | OUTPATIENT
Start: 2021-05-10 | End: 2021-05-10

## 2021-05-10 RX ORDER — LIDOCAINE HCL/PF 100 MG/5ML
SYRINGE (ML) INTRAVENOUS
Status: DISCONTINUED | OUTPATIENT
Start: 2021-05-10 | End: 2021-05-10

## 2021-05-10 RX ORDER — METOCLOPRAMIDE HYDROCHLORIDE 5 MG/ML
5 INJECTION INTRAMUSCULAR; INTRAVENOUS EVERY 6 HOURS PRN
Status: DISCONTINUED | OUTPATIENT
Start: 2021-05-10 | End: 2021-05-11 | Stop reason: HOSPADM

## 2021-05-10 RX ORDER — VANCOMYCIN HCL IN 5 % DEXTROSE 1G/250ML
15 PLASTIC BAG, INJECTION (ML) INTRAVENOUS
Status: COMPLETED | OUTPATIENT
Start: 2021-05-10 | End: 2021-05-10

## 2021-05-10 RX ORDER — DIPHENHYDRAMINE HYDROCHLORIDE 50 MG/ML
12.5 INJECTION INTRAMUSCULAR; INTRAVENOUS EVERY 30 MIN PRN
Status: DISCONTINUED | OUTPATIENT
Start: 2021-05-10 | End: 2021-05-10 | Stop reason: HOSPADM

## 2021-05-10 RX ORDER — NAPROXEN SODIUM 220 MG/1
81 TABLET, FILM COATED ORAL 2 TIMES DAILY
Status: DISCONTINUED | OUTPATIENT
Start: 2021-05-11 | End: 2021-05-11 | Stop reason: HOSPADM

## 2021-05-10 RX ORDER — OXYCODONE HYDROCHLORIDE 5 MG/1
5 TABLET ORAL
Status: DISCONTINUED | OUTPATIENT
Start: 2021-05-10 | End: 2021-05-10 | Stop reason: HOSPADM

## 2021-05-10 RX ORDER — ROCURONIUM BROMIDE 10 MG/ML
INJECTION, SOLUTION INTRAVENOUS
Status: DISCONTINUED | OUTPATIENT
Start: 2021-05-10 | End: 2021-05-10

## 2021-05-10 RX ORDER — HYDROMORPHONE HYDROCHLORIDE 2 MG/ML
0.4 INJECTION, SOLUTION INTRAMUSCULAR; INTRAVENOUS; SUBCUTANEOUS EVERY 5 MIN PRN
Status: DISCONTINUED | OUTPATIENT
Start: 2021-05-10 | End: 2021-05-10 | Stop reason: HOSPADM

## 2021-05-10 RX ORDER — OXYCODONE HYDROCHLORIDE 5 MG/1
5 TABLET ORAL EVERY 4 HOURS PRN
Status: DISCONTINUED | OUTPATIENT
Start: 2021-05-10 | End: 2021-05-11 | Stop reason: HOSPADM

## 2021-05-10 RX ORDER — PROPOFOL 10 MG/ML
VIAL (ML) INTRAVENOUS
Status: DISCONTINUED | OUTPATIENT
Start: 2021-05-10 | End: 2021-05-10

## 2021-05-10 RX ORDER — ACETAMINOPHEN 500 MG
1000 TABLET ORAL EVERY 8 HOURS
Status: DISCONTINUED | OUTPATIENT
Start: 2021-05-10 | End: 2021-05-11 | Stop reason: HOSPADM

## 2021-05-10 RX ORDER — OXYCODONE AND ACETAMINOPHEN 5; 325 MG/1; MG/1
TABLET ORAL
Qty: 50 TABLET | Refills: 0 | Status: SHIPPED | OUTPATIENT
Start: 2021-05-10 | End: 2021-08-19 | Stop reason: ALTCHOICE

## 2021-05-10 RX ORDER — BISACODYL 10 MG
10 SUPPOSITORY, RECTAL RECTAL DAILY PRN
Status: DISCONTINUED | OUTPATIENT
Start: 2021-05-10 | End: 2021-05-11 | Stop reason: HOSPADM

## 2021-05-10 RX ORDER — FENTANYL CITRATE 50 UG/ML
INJECTION, SOLUTION INTRAMUSCULAR; INTRAVENOUS
Status: DISCONTINUED | OUTPATIENT
Start: 2021-05-10 | End: 2021-05-10

## 2021-05-10 RX ORDER — NAPROXEN SODIUM 220 MG/1
81 TABLET, FILM COATED ORAL 2 TIMES DAILY
Qty: 60 TABLET | Refills: 0 | Status: SHIPPED | OUTPATIENT
Start: 2021-05-10 | End: 2021-06-16

## 2021-05-10 RX ORDER — FAMOTIDINE 20 MG/1
20 TABLET, FILM COATED ORAL 2 TIMES DAILY
Status: DISCONTINUED | OUTPATIENT
Start: 2021-05-10 | End: 2021-05-11 | Stop reason: HOSPADM

## 2021-05-10 RX ORDER — NEOSTIGMINE METHYLSULFATE 1 MG/ML
INJECTION, SOLUTION INTRAVENOUS
Status: DISCONTINUED | OUTPATIENT
Start: 2021-05-10 | End: 2021-05-10

## 2021-05-10 RX ORDER — OXYBUTYNIN CHLORIDE 5 MG/1
5 TABLET, EXTENDED RELEASE ORAL DAILY
Status: DISCONTINUED | OUTPATIENT
Start: 2021-05-11 | End: 2021-05-11 | Stop reason: HOSPADM

## 2021-05-10 RX ORDER — MUPIROCIN 20 MG/G
1 OINTMENT TOPICAL 2 TIMES DAILY
Status: DISCONTINUED | OUTPATIENT
Start: 2021-05-10 | End: 2021-05-11 | Stop reason: HOSPADM

## 2021-05-10 RX ORDER — KETOROLAC TROMETHAMINE 30 MG/ML
INJECTION, SOLUTION INTRAMUSCULAR; INTRAVENOUS
Status: DISCONTINUED | OUTPATIENT
Start: 2021-05-10 | End: 2021-05-10

## 2021-05-10 RX ORDER — SERTRALINE HYDROCHLORIDE 50 MG/1
50 TABLET, FILM COATED ORAL NIGHTLY
Status: DISCONTINUED | OUTPATIENT
Start: 2021-05-10 | End: 2021-05-11 | Stop reason: HOSPADM

## 2021-05-10 RX ORDER — BUPIVACAINE HYDROCHLORIDE 2.5 MG/ML
INJECTION, SOLUTION INFILTRATION; PERINEURAL
Status: DISCONTINUED | OUTPATIENT
Start: 2021-05-10 | End: 2021-05-10 | Stop reason: HOSPADM

## 2021-05-10 RX ORDER — TRANEXAMIC ACID 100 MG/ML
INJECTION, SOLUTION INTRAVENOUS
Status: DISCONTINUED | OUTPATIENT
Start: 2021-05-10 | End: 2021-05-10

## 2021-05-10 RX ORDER — ACETAMINOPHEN 500 MG
1000 TABLET ORAL
Status: COMPLETED | OUTPATIENT
Start: 2021-05-10 | End: 2021-05-10

## 2021-05-10 RX ORDER — ONDANSETRON 8 MG/1
8 TABLET, ORALLY DISINTEGRATING ORAL EVERY 8 HOURS PRN
Qty: 20 TABLET | Refills: 1 | Status: SHIPPED | OUTPATIENT
Start: 2021-05-10 | End: 2021-10-06

## 2021-05-10 RX ORDER — CELECOXIB 200 MG/1
200 CAPSULE ORAL 2 TIMES DAILY
Status: DISCONTINUED | OUTPATIENT
Start: 2021-05-10 | End: 2021-05-11 | Stop reason: HOSPADM

## 2021-05-10 RX ORDER — SODIUM CHLORIDE, SODIUM LACTATE, POTASSIUM CHLORIDE, CALCIUM CHLORIDE 600; 310; 30; 20 MG/100ML; MG/100ML; MG/100ML; MG/100ML
INJECTION, SOLUTION INTRAVENOUS CONTINUOUS
Status: DISCONTINUED | OUTPATIENT
Start: 2021-05-10 | End: 2021-05-11 | Stop reason: HOSPADM

## 2021-05-10 RX ORDER — DIPHENHYDRAMINE HYDROCHLORIDE 50 MG/ML
25 INJECTION INTRAMUSCULAR; INTRAVENOUS EVERY 6 HOURS PRN
Status: DISCONTINUED | OUTPATIENT
Start: 2021-05-10 | End: 2021-05-11 | Stop reason: HOSPADM

## 2021-05-10 RX ORDER — SODIUM CHLORIDE 0.9 % (FLUSH) 0.9 %
3 SYRINGE (ML) INJECTION
Status: DISCONTINUED | OUTPATIENT
Start: 2021-05-10 | End: 2021-05-11 | Stop reason: HOSPADM

## 2021-05-10 RX ORDER — KETOROLAC TROMETHAMINE 30 MG/ML
INJECTION, SOLUTION INTRAMUSCULAR; INTRAVENOUS
Status: DISCONTINUED | OUTPATIENT
Start: 2021-05-10 | End: 2021-05-10 | Stop reason: HOSPADM

## 2021-05-10 RX ORDER — POLYETHYLENE GLYCOL 3350 17 G/17G
17 POWDER, FOR SOLUTION ORAL DAILY
Status: DISCONTINUED | OUTPATIENT
Start: 2021-05-10 | End: 2021-05-11 | Stop reason: HOSPADM

## 2021-05-10 RX ORDER — LOSARTAN POTASSIUM 50 MG/1
50 TABLET ORAL DAILY
Status: DISCONTINUED | OUTPATIENT
Start: 2021-05-10 | End: 2021-05-11 | Stop reason: HOSPADM

## 2021-05-10 RX ORDER — HYDROMORPHONE HYDROCHLORIDE 1 MG/ML
0.5 INJECTION, SOLUTION INTRAMUSCULAR; INTRAVENOUS; SUBCUTANEOUS EVERY 4 HOURS PRN
Status: DISCONTINUED | OUTPATIENT
Start: 2021-05-10 | End: 2021-05-11 | Stop reason: HOSPADM

## 2021-05-10 RX ORDER — CEFAZOLIN SODIUM 2 G/50ML
2 SOLUTION INTRAVENOUS
Status: COMPLETED | OUTPATIENT
Start: 2021-05-10 | End: 2021-05-11

## 2021-05-10 RX ORDER — CHOLESTYRAMINE 4 G/9G
1 POWDER, FOR SUSPENSION ORAL DAILY
Status: DISCONTINUED | OUTPATIENT
Start: 2021-05-11 | End: 2021-05-11 | Stop reason: HOSPADM

## 2021-05-10 RX ORDER — OXYCODONE HYDROCHLORIDE 5 MG/1
10 TABLET ORAL EVERY 4 HOURS PRN
Status: DISCONTINUED | OUTPATIENT
Start: 2021-05-10 | End: 2021-05-11 | Stop reason: HOSPADM

## 2021-05-10 RX ADMIN — CELECOXIB 200 MG: 200 CAPSULE ORAL at 09:05

## 2021-05-10 RX ADMIN — FENTANYL CITRATE 100 MCG: 50 INJECTION, SOLUTION INTRAMUSCULAR; INTRAVENOUS at 10:05

## 2021-05-10 RX ADMIN — ROCURONIUM BROMIDE 30 MG: 10 INJECTION, SOLUTION INTRAVENOUS at 10:05

## 2021-05-10 RX ADMIN — SODIUM CHLORIDE, SODIUM LACTATE, POTASSIUM CHLORIDE, AND CALCIUM CHLORIDE: 600; 310; 30; 20 INJECTION, SOLUTION INTRAVENOUS at 09:05

## 2021-05-10 RX ADMIN — CARBOXYMETHYLCELLULOSE SODIUM 4 DROP: 2.5 SOLUTION/ DROPS OPHTHALMIC at 10:05

## 2021-05-10 RX ADMIN — VANCOMYCIN HYDROCHLORIDE 1000 MG: 1 INJECTION, POWDER, LYOPHILIZED, FOR SOLUTION INTRAVENOUS at 09:05

## 2021-05-10 RX ADMIN — PROPOFOL 160 MG: 10 INJECTION, EMULSION INTRAVENOUS at 10:05

## 2021-05-10 RX ADMIN — ACETAMINOPHEN 1000 MG: 500 TABLET, FILM COATED ORAL at 09:05

## 2021-05-10 RX ADMIN — NEOSTIGMINE METHYLSULFATE 3 MG: 1 INJECTION INTRAVENOUS at 11:05

## 2021-05-10 RX ADMIN — PHENYLEPHRINE HYDROCHLORIDE 200 MCG: 10 INJECTION INTRAVENOUS at 11:05

## 2021-05-10 RX ADMIN — CEFAZOLIN 2 G: 330 INJECTION, POWDER, FOR SOLUTION INTRAMUSCULAR; INTRAVENOUS at 10:05

## 2021-05-10 RX ADMIN — HYDROMORPHONE HYDROCHLORIDE 0.4 MG: 2 INJECTION INTRAMUSCULAR; INTRAVENOUS; SUBCUTANEOUS at 12:05

## 2021-05-10 RX ADMIN — FENTANYL CITRATE 50 MCG: 50 INJECTION, SOLUTION INTRAMUSCULAR; INTRAVENOUS at 11:05

## 2021-05-10 RX ADMIN — ACETAMINOPHEN 1000 MG: 500 TABLET, FILM COATED ORAL at 05:05

## 2021-05-10 RX ADMIN — SODIUM CHLORIDE, SODIUM LACTATE, POTASSIUM CHLORIDE, AND CALCIUM CHLORIDE: 600; 310; 30; 20 INJECTION, SOLUTION INTRAVENOUS at 11:05

## 2021-05-10 RX ADMIN — KETOROLAC TROMETHAMINE 15 MG: 30 INJECTION, SOLUTION INTRAMUSCULAR; INTRAVENOUS at 11:05

## 2021-05-10 RX ADMIN — DEXAMETHASONE SODIUM PHOSPHATE 8 MG: 4 INJECTION, SOLUTION INTRAMUSCULAR; INTRAVENOUS at 10:05

## 2021-05-10 RX ADMIN — LIDOCAINE HYDROCHLORIDE 50 MG: 20 INJECTION, SOLUTION INTRAVENOUS at 10:05

## 2021-05-10 RX ADMIN — GLYCOPYRROLATE 0.2 MG: 0.2 INJECTION, SOLUTION INTRAMUSCULAR; INTRAVITREAL at 11:05

## 2021-05-10 RX ADMIN — CEFAZOLIN SODIUM 2 G: 2 SOLUTION INTRAVENOUS at 06:05

## 2021-05-10 RX ADMIN — MUPIROCIN 1 G: 20 OINTMENT TOPICAL at 09:05

## 2021-05-10 RX ADMIN — ONDANSETRON 4 MG: 2 INJECTION, SOLUTION INTRAMUSCULAR; INTRAVENOUS at 11:05

## 2021-05-10 RX ADMIN — GLYCOPYRROLATE 0.6 MG: 0.2 INJECTION, SOLUTION INTRAMUSCULAR; INTRAVITREAL at 11:05

## 2021-05-10 RX ADMIN — DOCUSATE SODIUM 100 MG: 100 CAPSULE, LIQUID FILLED ORAL at 09:05

## 2021-05-10 RX ADMIN — FAMOTIDINE 20 MG: 20 TABLET ORAL at 09:05

## 2021-05-10 RX ADMIN — TRANEXAMIC ACID 1270 MG: 100 INJECTION, SOLUTION INTRAVENOUS at 09:05

## 2021-05-10 RX ADMIN — DEXTROSE AND SODIUM CHLORIDE: 5; .9 INJECTION, SOLUTION INTRAVENOUS at 01:05

## 2021-05-11 VITALS
DIASTOLIC BLOOD PRESSURE: 56 MMHG | WEIGHT: 140 LBS | OXYGEN SATURATION: 98 % | HEART RATE: 62 BPM | SYSTOLIC BLOOD PRESSURE: 103 MMHG | TEMPERATURE: 98 F | BODY MASS INDEX: 22.5 KG/M2 | RESPIRATION RATE: 12 BRPM | HEIGHT: 66 IN

## 2021-05-11 LAB
ERYTHROCYTE [DISTWIDTH] IN BLOOD BY AUTOMATED COUNT: 13.2 % (ref 11.5–14.5)
HCT VFR BLD AUTO: 32.6 % (ref 37–48.5)
HGB BLD-MCNC: 10.7 G/DL (ref 12–16)
MCH RBC QN AUTO: 30.4 PG (ref 27–31)
MCHC RBC AUTO-ENTMCNC: 32.8 G/DL (ref 32–36)
MCV RBC AUTO: 93 FL (ref 82–98)
PLATELET # BLD AUTO: 171 K/UL (ref 150–450)
PMV BLD AUTO: 9.4 FL (ref 9.2–12.9)
RBC # BLD AUTO: 3.52 M/UL (ref 4–5.4)
WBC # BLD AUTO: 6.8 K/UL (ref 3.9–12.7)

## 2021-05-11 PROCEDURE — 25000003 PHARM REV CODE 250: Performed by: NURSE PRACTITIONER

## 2021-05-11 PROCEDURE — 97110 THERAPEUTIC EXERCISES: CPT | Mod: CQ

## 2021-05-11 PROCEDURE — 25000003 PHARM REV CODE 250: Performed by: ORTHOPAEDIC SURGERY

## 2021-05-11 PROCEDURE — 36415 COLL VENOUS BLD VENIPUNCTURE: CPT | Performed by: ORTHOPAEDIC SURGERY

## 2021-05-11 PROCEDURE — 97535 SELF CARE MNGMENT TRAINING: CPT

## 2021-05-11 PROCEDURE — 85027 COMPLETE CBC AUTOMATED: CPT | Performed by: ORTHOPAEDIC SURGERY

## 2021-05-11 PROCEDURE — 97530 THERAPEUTIC ACTIVITIES: CPT | Mod: CQ

## 2021-05-11 PROCEDURE — 94761 N-INVAS EAR/PLS OXIMETRY MLT: CPT

## 2021-05-11 PROCEDURE — 97116 GAIT TRAINING THERAPY: CPT | Mod: CQ

## 2021-05-11 PROCEDURE — 94799 UNLISTED PULMONARY SVC/PX: CPT

## 2021-05-11 PROCEDURE — 63600175 PHARM REV CODE 636 W HCPCS: Performed by: ORTHOPAEDIC SURGERY

## 2021-05-11 PROCEDURE — 97165 OT EVAL LOW COMPLEX 30 MIN: CPT

## 2021-05-11 RX ADMIN — POLYETHYLENE GLYCOL 3350 17 G: 17 POWDER, FOR SOLUTION ORAL at 10:05

## 2021-05-11 RX ADMIN — SODIUM CHLORIDE 500 ML: 0.9 INJECTION, SOLUTION INTRAVENOUS at 09:05

## 2021-05-11 RX ADMIN — OXYCODONE 5 MG: 5 TABLET ORAL at 07:05

## 2021-05-11 RX ADMIN — CEFAZOLIN SODIUM 2 G: 2 SOLUTION INTRAVENOUS at 02:05

## 2021-05-11 RX ADMIN — MUPIROCIN 1 G: 20 OINTMENT TOPICAL at 10:05

## 2021-05-11 RX ADMIN — CELECOXIB 200 MG: 200 CAPSULE ORAL at 09:05

## 2021-05-11 RX ADMIN — ACETAMINOPHEN 1000 MG: 500 TABLET, FILM COATED ORAL at 05:05

## 2021-05-11 RX ADMIN — OXYBUTYNIN CHLORIDE 5 MG: 5 TABLET, EXTENDED RELEASE ORAL at 09:05

## 2021-05-11 RX ADMIN — FAMOTIDINE 20 MG: 20 TABLET ORAL at 09:05

## 2021-05-11 RX ADMIN — DEXTROSE AND SODIUM CHLORIDE: 5; .9 INJECTION, SOLUTION INTRAVENOUS at 12:05

## 2021-05-11 RX ADMIN — ASPIRIN 81 MG CHEWABLE TABLET 81 MG: 81 TABLET CHEWABLE at 09:05

## 2021-05-11 RX ADMIN — DOCUSATE SODIUM 100 MG: 100 CAPSULE, LIQUID FILLED ORAL at 09:05

## 2021-05-18 ENCOUNTER — PATIENT MESSAGE (OUTPATIENT)
Dept: INFUSION THERAPY | Facility: OTHER | Age: 76
End: 2021-05-18

## 2021-05-18 ENCOUNTER — TELEPHONE (OUTPATIENT)
Dept: NEUROLOGY | Facility: CLINIC | Age: 76
End: 2021-05-18

## 2021-05-19 ENCOUNTER — EXTERNAL HOME HEALTH (OUTPATIENT)
Dept: HOME HEALTH SERVICES | Facility: HOSPITAL | Age: 76
End: 2021-05-19

## 2021-05-19 ENCOUNTER — TELEPHONE (OUTPATIENT)
Dept: NEUROLOGY | Facility: CLINIC | Age: 76
End: 2021-05-19

## 2021-06-16 ENCOUNTER — OFFICE VISIT (OUTPATIENT)
Dept: INTERNAL MEDICINE | Facility: CLINIC | Age: 76
End: 2021-06-16
Payer: MEDICARE

## 2021-06-16 ENCOUNTER — TELEPHONE (OUTPATIENT)
Dept: INTERNAL MEDICINE | Facility: CLINIC | Age: 76
End: 2021-06-16

## 2021-06-16 VITALS
OXYGEN SATURATION: 90 % | WEIGHT: 141.75 LBS | DIASTOLIC BLOOD PRESSURE: 73 MMHG | SYSTOLIC BLOOD PRESSURE: 110 MMHG | HEIGHT: 66 IN | HEART RATE: 78 BPM | BODY MASS INDEX: 22.78 KG/M2

## 2021-06-16 DIAGNOSIS — I10 ESSENTIAL HYPERTENSION: ICD-10-CM

## 2021-06-16 DIAGNOSIS — R05.9 COUGH: ICD-10-CM

## 2021-06-16 DIAGNOSIS — J02.9 SORE THROAT: Primary | ICD-10-CM

## 2021-06-16 PROCEDURE — 99215 OFFICE O/P EST HI 40 MIN: CPT | Mod: S$PBB,,, | Performed by: INTERNAL MEDICINE

## 2021-06-16 PROCEDURE — 99214 OFFICE O/P EST MOD 30 MIN: CPT | Mod: PBBFAC | Performed by: INTERNAL MEDICINE

## 2021-06-16 PROCEDURE — 99999 PR PBB SHADOW E&M-EST. PATIENT-LVL IV: ICD-10-PCS | Mod: PBBFAC,,, | Performed by: INTERNAL MEDICINE

## 2021-06-16 PROCEDURE — 99215 PR OFFICE/OUTPT VISIT, EST, LEVL V, 40-54 MIN: ICD-10-PCS | Mod: S$PBB,,, | Performed by: INTERNAL MEDICINE

## 2021-06-16 PROCEDURE — 99999 PR PBB SHADOW E&M-EST. PATIENT-LVL IV: CPT | Mod: PBBFAC,,, | Performed by: INTERNAL MEDICINE

## 2021-06-16 RX ORDER — PROMETHAZINE HYDROCHLORIDE AND CODEINE PHOSPHATE 6.25; 1 MG/5ML; MG/5ML
5 SOLUTION ORAL NIGHTLY PRN
Qty: 118 ML | Refills: 0 | Status: SHIPPED | OUTPATIENT
Start: 2021-06-16 | End: 2021-06-26

## 2021-06-23 ENCOUNTER — OFFICE VISIT (OUTPATIENT)
Dept: OTOLARYNGOLOGY | Facility: CLINIC | Age: 76
End: 2021-06-23
Payer: MEDICARE

## 2021-06-23 ENCOUNTER — PATIENT MESSAGE (OUTPATIENT)
Dept: ADMINISTRATIVE | Facility: OTHER | Age: 76
End: 2021-06-23

## 2021-06-23 ENCOUNTER — PATIENT OUTREACH (OUTPATIENT)
Dept: ADMINISTRATIVE | Facility: OTHER | Age: 76
End: 2021-06-23

## 2021-06-23 VITALS
TEMPERATURE: 99 F | WEIGHT: 144 LBS | BODY MASS INDEX: 23.14 KG/M2 | HEART RATE: 76 BPM | SYSTOLIC BLOOD PRESSURE: 114 MMHG | DIASTOLIC BLOOD PRESSURE: 77 MMHG | HEIGHT: 66 IN

## 2021-06-23 DIAGNOSIS — K21.9 LPRD (LARYNGOPHARYNGEAL REFLUX DISEASE): ICD-10-CM

## 2021-06-23 DIAGNOSIS — Z12.31 BREAST CANCER SCREENING BY MAMMOGRAM: Primary | ICD-10-CM

## 2021-06-23 DIAGNOSIS — H61.21 IMPACTED CERUMEN OF RIGHT EAR: ICD-10-CM

## 2021-06-23 DIAGNOSIS — R04.0 EPISTAXIS: Primary | ICD-10-CM

## 2021-06-23 PROCEDURE — 30901 PR CTRL 2SEBLEED,ANTER,SIMPLE: ICD-10-PCS | Mod: LT,S$GLB,, | Performed by: OTOLARYNGOLOGY

## 2021-06-23 PROCEDURE — 30901 CONTROL OF NOSEBLEED: CPT | Mod: LT,S$GLB,, | Performed by: OTOLARYNGOLOGY

## 2021-06-23 PROCEDURE — 99214 PR OFFICE/OUTPT VISIT, EST, LEVL IV, 30-39 MIN: ICD-10-PCS | Mod: 25,S$GLB,, | Performed by: OTOLARYNGOLOGY

## 2021-06-23 PROCEDURE — 99214 OFFICE O/P EST MOD 30 MIN: CPT | Mod: 25,S$GLB,, | Performed by: OTOLARYNGOLOGY

## 2021-06-24 ENCOUNTER — TELEPHONE (OUTPATIENT)
Dept: OTOLARYNGOLOGY | Facility: CLINIC | Age: 76
End: 2021-06-24

## 2021-06-29 ENCOUNTER — NURSE TRIAGE (OUTPATIENT)
Dept: ADMINISTRATIVE | Facility: CLINIC | Age: 76
End: 2021-06-29

## 2021-06-29 ENCOUNTER — TELEPHONE (OUTPATIENT)
Dept: OTOLARYNGOLOGY | Facility: CLINIC | Age: 76
End: 2021-06-29

## 2021-07-06 ENCOUNTER — OFFICE VISIT (OUTPATIENT)
Dept: OTOLARYNGOLOGY | Facility: CLINIC | Age: 76
End: 2021-07-06
Payer: MEDICARE

## 2021-07-06 VITALS
TEMPERATURE: 98 F | BODY MASS INDEX: 23.14 KG/M2 | HEART RATE: 75 BPM | HEIGHT: 66 IN | SYSTOLIC BLOOD PRESSURE: 116 MMHG | DIASTOLIC BLOOD PRESSURE: 71 MMHG | WEIGHT: 144 LBS

## 2021-07-06 DIAGNOSIS — R04.0 EPISTAXIS: Primary | ICD-10-CM

## 2021-07-06 PROCEDURE — 99212 PR OFFICE/OUTPT VISIT, EST, LEVL II, 10-19 MIN: ICD-10-PCS | Mod: S$GLB,,, | Performed by: OTOLARYNGOLOGY

## 2021-07-06 PROCEDURE — 99212 OFFICE O/P EST SF 10 MIN: CPT | Mod: S$GLB,,, | Performed by: OTOLARYNGOLOGY

## 2021-07-07 ENCOUNTER — TELEPHONE (OUTPATIENT)
Dept: OTOLARYNGOLOGY | Facility: CLINIC | Age: 76
End: 2021-07-07

## 2021-07-07 ENCOUNTER — PATIENT MESSAGE (OUTPATIENT)
Dept: ADMINISTRATIVE | Facility: HOSPITAL | Age: 76
End: 2021-07-07

## 2021-07-27 ENCOUNTER — PATIENT OUTREACH (OUTPATIENT)
Dept: ADMINISTRATIVE | Facility: OTHER | Age: 76
End: 2021-07-27

## 2021-07-28 ENCOUNTER — OFFICE VISIT (OUTPATIENT)
Dept: OTOLARYNGOLOGY | Facility: CLINIC | Age: 76
End: 2021-07-28
Payer: MEDICARE

## 2021-07-28 VITALS
HEIGHT: 66 IN | SYSTOLIC BLOOD PRESSURE: 122 MMHG | WEIGHT: 143.13 LBS | TEMPERATURE: 98 F | BODY MASS INDEX: 23 KG/M2 | DIASTOLIC BLOOD PRESSURE: 72 MMHG | HEART RATE: 70 BPM

## 2021-07-28 DIAGNOSIS — H61.22 EXCESSIVE CERUMEN IN EAR CANAL, LEFT: ICD-10-CM

## 2021-07-28 DIAGNOSIS — H61.21 IMPACTED CERUMEN OF RIGHT EAR: Primary | ICD-10-CM

## 2021-07-28 PROCEDURE — 99499 UNLISTED E&M SERVICE: CPT | Mod: S$GLB,,, | Performed by: OTOLARYNGOLOGY

## 2021-07-28 PROCEDURE — 69210 EAR CERUMEN REMOVAL: ICD-10-PCS | Mod: S$GLB,,, | Performed by: OTOLARYNGOLOGY

## 2021-07-28 PROCEDURE — 69210 REMOVE IMPACTED EAR WAX UNI: CPT | Mod: S$GLB,,, | Performed by: OTOLARYNGOLOGY

## 2021-07-28 PROCEDURE — 99499 NO LOS: ICD-10-PCS | Mod: S$GLB,,, | Performed by: OTOLARYNGOLOGY

## 2021-08-03 ENCOUNTER — OFFICE VISIT (OUTPATIENT)
Dept: ENDOCRINOLOGY | Facility: CLINIC | Age: 76
End: 2021-08-03
Payer: MEDICARE

## 2021-08-03 ENCOUNTER — LAB VISIT (OUTPATIENT)
Dept: LAB | Facility: HOSPITAL | Age: 76
End: 2021-08-03
Attending: INTERNAL MEDICINE
Payer: MEDICARE

## 2021-08-03 VITALS
OXYGEN SATURATION: 98 % | HEIGHT: 66 IN | SYSTOLIC BLOOD PRESSURE: 124 MMHG | DIASTOLIC BLOOD PRESSURE: 92 MMHG | HEART RATE: 62 BPM | BODY MASS INDEX: 23.03 KG/M2 | WEIGHT: 143.31 LBS | RESPIRATION RATE: 18 BRPM

## 2021-08-03 DIAGNOSIS — M81.0 OSTEOPOROSIS, UNSPECIFIED OSTEOPOROSIS TYPE, UNSPECIFIED PATHOLOGICAL FRACTURE PRESENCE: Primary | ICD-10-CM

## 2021-08-03 DIAGNOSIS — M81.0 OSTEOPOROSIS, UNSPECIFIED OSTEOPOROSIS TYPE, UNSPECIFIED PATHOLOGICAL FRACTURE PRESENCE: ICD-10-CM

## 2021-08-03 DIAGNOSIS — E55.9 VITAMIN D DEFICIENCY: ICD-10-CM

## 2021-08-03 PROBLEM — M85.80 OSTEOPENIA: Status: RESOLVED | Noted: 2019-01-27 | Resolved: 2021-08-03

## 2021-08-03 PROBLEM — K82.9 DISEASE OF GALLBLADDER: Status: RESOLVED | Noted: 2020-12-08 | Resolved: 2021-08-03

## 2021-08-03 LAB
ALBUMIN SERPL BCP-MCNC: 3.9 G/DL (ref 3.5–5.2)
ALP SERPL-CCNC: 92 U/L (ref 55–135)
ALT SERPL W/O P-5'-P-CCNC: 10 U/L (ref 10–44)
ANION GAP SERPL CALC-SCNC: 9 MMOL/L (ref 8–16)
AST SERPL-CCNC: 17 U/L (ref 10–40)
BILIRUB SERPL-MCNC: 0.5 MG/DL (ref 0.1–1)
BUN SERPL-MCNC: 10 MG/DL (ref 8–23)
CALCIUM SERPL-MCNC: 9.6 MG/DL (ref 8.7–10.5)
CHLORIDE SERPL-SCNC: 103 MMOL/L (ref 95–110)
CO2 SERPL-SCNC: 32 MMOL/L (ref 23–29)
CREAT SERPL-MCNC: 0.7 MG/DL (ref 0.5–1.4)
EST. GFR  (AFRICAN AMERICAN): >60 ML/MIN/1.73 M^2
EST. GFR  (NON AFRICAN AMERICAN): >60 ML/MIN/1.73 M^2
GLUCOSE SERPL-MCNC: 94 MG/DL (ref 70–110)
POTASSIUM SERPL-SCNC: 3.5 MMOL/L (ref 3.5–5.1)
PROT SERPL-MCNC: 7 G/DL (ref 6–8.4)
SODIUM SERPL-SCNC: 144 MMOL/L (ref 136–145)
TSH SERPL DL<=0.005 MIU/L-ACNC: 1.27 UIU/ML (ref 0.4–4)

## 2021-08-03 PROCEDURE — 99999 PR PBB SHADOW E&M-EST. PATIENT-LVL IV: CPT | Mod: PBBFAC,,, | Performed by: INTERNAL MEDICINE

## 2021-08-03 PROCEDURE — 84443 ASSAY THYROID STIM HORMONE: CPT | Performed by: INTERNAL MEDICINE

## 2021-08-03 PROCEDURE — 99204 OFFICE O/P NEW MOD 45 MIN: CPT | Mod: S$PBB,,, | Performed by: INTERNAL MEDICINE

## 2021-08-03 PROCEDURE — 83516 IMMUNOASSAY NONANTIBODY: CPT | Performed by: INTERNAL MEDICINE

## 2021-08-03 PROCEDURE — 36415 COLL VENOUS BLD VENIPUNCTURE: CPT | Performed by: INTERNAL MEDICINE

## 2021-08-03 PROCEDURE — 99999 PR PBB SHADOW E&M-EST. PATIENT-LVL IV: ICD-10-PCS | Mod: PBBFAC,,, | Performed by: INTERNAL MEDICINE

## 2021-08-03 PROCEDURE — 99214 OFFICE O/P EST MOD 30 MIN: CPT | Mod: PBBFAC | Performed by: INTERNAL MEDICINE

## 2021-08-03 PROCEDURE — 80053 COMPREHEN METABOLIC PANEL: CPT | Performed by: INTERNAL MEDICINE

## 2021-08-03 PROCEDURE — 99204 PR OFFICE/OUTPT VISIT, NEW, LEVL IV, 45-59 MIN: ICD-10-PCS | Mod: S$PBB,,, | Performed by: INTERNAL MEDICINE

## 2021-08-04 ENCOUNTER — PATIENT MESSAGE (OUTPATIENT)
Dept: ENDOCRINOLOGY | Facility: CLINIC | Age: 76
End: 2021-08-04

## 2021-08-05 ENCOUNTER — TELEPHONE (OUTPATIENT)
Dept: ENDOCRINOLOGY | Facility: CLINIC | Age: 76
End: 2021-08-05

## 2021-08-06 ENCOUNTER — PATIENT MESSAGE (OUTPATIENT)
Dept: ENDOCRINOLOGY | Facility: CLINIC | Age: 76
End: 2021-08-06

## 2021-08-06 ENCOUNTER — TELEPHONE (OUTPATIENT)
Dept: ENDOCRINOLOGY | Facility: CLINIC | Age: 76
End: 2021-08-06

## 2021-08-06 ENCOUNTER — HOSPITAL ENCOUNTER (OUTPATIENT)
Dept: RADIOLOGY | Facility: OTHER | Age: 76
Discharge: HOME | End: 2021-08-06
Attending: INTERNAL MEDICINE
Payer: MEDICARE

## 2021-08-06 DIAGNOSIS — M81.0 OSTEOPOROSIS, UNSPECIFIED OSTEOPOROSIS TYPE, UNSPECIFIED PATHOLOGICAL FRACTURE PRESENCE: ICD-10-CM

## 2021-08-06 LAB — TTG IGA SER-ACNC: 5 UNITS

## 2021-08-06 PROCEDURE — 77080 DXA BONE DENSITY AXIAL: CPT | Mod: TC

## 2021-08-06 PROCEDURE — 77080 DEXA BONE DENSITY SPINE HIP: ICD-10-PCS | Mod: 26,,, | Performed by: RADIOLOGY

## 2021-08-06 PROCEDURE — 77080 DXA BONE DENSITY AXIAL: CPT | Mod: 26,,, | Performed by: RADIOLOGY

## 2021-08-19 ENCOUNTER — OFFICE VISIT (OUTPATIENT)
Dept: OTOLARYNGOLOGY | Facility: CLINIC | Age: 76
End: 2021-08-19
Payer: MEDICARE

## 2021-08-19 VITALS
TEMPERATURE: 98 F | BODY MASS INDEX: 23.73 KG/M2 | WEIGHT: 147 LBS | DIASTOLIC BLOOD PRESSURE: 83 MMHG | SYSTOLIC BLOOD PRESSURE: 135 MMHG | HEART RATE: 76 BPM

## 2021-08-19 DIAGNOSIS — L98.8 FACIAL RHYTIDS: Primary | ICD-10-CM

## 2021-08-19 PROCEDURE — 99213 PR OFFICE/OUTPT VISIT, EST, LEVL III, 20-29 MIN: ICD-10-PCS | Mod: S$GLB,,, | Performed by: OTOLARYNGOLOGY

## 2021-08-19 PROCEDURE — 99213 OFFICE O/P EST LOW 20 MIN: CPT | Mod: S$GLB,,, | Performed by: OTOLARYNGOLOGY

## 2021-08-19 RX ORDER — MULTIVITAMIN
1 TABLET ORAL DAILY
COMMUNITY

## 2021-09-16 ENCOUNTER — PATIENT MESSAGE (OUTPATIENT)
Dept: NEUROLOGY | Facility: CLINIC | Age: 76
End: 2021-09-16

## 2021-09-16 ENCOUNTER — LAB VISIT (OUTPATIENT)
Dept: LAB | Facility: OTHER | Age: 76
End: 2021-09-16
Attending: INTERNAL MEDICINE
Payer: MEDICARE

## 2021-09-16 ENCOUNTER — PATIENT MESSAGE (OUTPATIENT)
Dept: ENDOCRINOLOGY | Facility: CLINIC | Age: 76
End: 2021-09-16

## 2021-09-16 DIAGNOSIS — Z96.642 PRESENCE OF LEFT ARTIFICIAL HIP JOINT: Primary | ICD-10-CM

## 2021-09-16 DIAGNOSIS — M25.552 LEFT HIP PAIN: ICD-10-CM

## 2021-09-16 LAB
CRP SERPL-MCNC: 2.5 MG/L (ref 0–8.2)
ERYTHROCYTE [SEDIMENTATION RATE] IN BLOOD: 7 MM/HR (ref 0–20)

## 2021-09-16 PROCEDURE — 85651 RBC SED RATE NONAUTOMATED: CPT | Performed by: ORTHOPAEDIC SURGERY

## 2021-09-16 PROCEDURE — 36415 COLL VENOUS BLD VENIPUNCTURE: CPT | Performed by: ORTHOPAEDIC SURGERY

## 2021-09-16 PROCEDURE — 86140 C-REACTIVE PROTEIN: CPT | Performed by: ORTHOPAEDIC SURGERY

## 2021-09-17 ENCOUNTER — PATIENT MESSAGE (OUTPATIENT)
Dept: ENDOCRINOLOGY | Facility: CLINIC | Age: 76
End: 2021-09-17

## 2021-09-19 RX ORDER — HEPARIN 100 UNIT/ML
500 SYRINGE INTRAVENOUS
Status: CANCELLED | OUTPATIENT
Start: 2021-09-19

## 2021-09-19 RX ORDER — FAMOTIDINE 10 MG/ML
20 INJECTION INTRAVENOUS
Status: CANCELLED | OUTPATIENT
Start: 2021-09-19

## 2021-09-19 RX ORDER — SODIUM CHLORIDE 0.9 % (FLUSH) 0.9 %
10 SYRINGE (ML) INJECTION
Status: CANCELLED | OUTPATIENT
Start: 2021-09-19

## 2021-09-19 RX ORDER — ACETAMINOPHEN 325 MG/1
650 TABLET ORAL
Status: CANCELLED | OUTPATIENT
Start: 2021-09-19

## 2021-09-22 ENCOUNTER — TELEPHONE (OUTPATIENT)
Dept: ENDOCRINOLOGY | Facility: CLINIC | Age: 76
End: 2021-09-22

## 2021-09-22 ENCOUNTER — TELEPHONE (OUTPATIENT)
Dept: NEUROLOGY | Facility: CLINIC | Age: 76
End: 2021-09-22

## 2021-09-22 ENCOUNTER — PATIENT MESSAGE (OUTPATIENT)
Dept: NEUROLOGY | Facility: CLINIC | Age: 76
End: 2021-09-22

## 2021-09-22 ENCOUNTER — TELEPHONE (OUTPATIENT)
Dept: INFUSION THERAPY | Facility: OTHER | Age: 76
End: 2021-09-22

## 2021-09-23 ENCOUNTER — PATIENT MESSAGE (OUTPATIENT)
Dept: NEUROLOGY | Facility: CLINIC | Age: 76
End: 2021-09-23

## 2021-09-30 ENCOUNTER — TELEPHONE (OUTPATIENT)
Dept: NEUROLOGY | Facility: CLINIC | Age: 76
End: 2021-09-30

## 2021-09-30 ENCOUNTER — TELEPHONE (OUTPATIENT)
Dept: ENDOCRINOLOGY | Facility: CLINIC | Age: 76
End: 2021-09-30

## 2021-09-30 ENCOUNTER — TELEPHONE (OUTPATIENT)
Dept: INFUSION THERAPY | Facility: OTHER | Age: 76
End: 2021-09-30

## 2021-09-30 ENCOUNTER — INFUSION (OUTPATIENT)
Dept: INFUSION THERAPY | Facility: OTHER | Age: 76
End: 2021-09-30
Attending: STUDENT IN AN ORGANIZED HEALTH CARE EDUCATION/TRAINING PROGRAM
Payer: MEDICARE

## 2021-09-30 VITALS
TEMPERATURE: 98 F | RESPIRATION RATE: 16 BRPM | HEART RATE: 75 BPM | OXYGEN SATURATION: 96 % | DIASTOLIC BLOOD PRESSURE: 74 MMHG | SYSTOLIC BLOOD PRESSURE: 127 MMHG

## 2021-09-30 DIAGNOSIS — M81.0 OSTEOPOROSIS, UNSPECIFIED OSTEOPOROSIS TYPE, UNSPECIFIED PATHOLOGICAL FRACTURE PRESENCE: Primary | ICD-10-CM

## 2021-09-30 PROCEDURE — 96372 THER/PROPH/DIAG INJ SC/IM: CPT

## 2021-09-30 PROCEDURE — 63600175 PHARM REV CODE 636 W HCPCS: Mod: JG | Performed by: INTERNAL MEDICINE

## 2021-09-30 RX ORDER — HEPARIN 100 UNIT/ML
500 SYRINGE INTRAVENOUS
Status: CANCELLED | OUTPATIENT
Start: 2021-10-01

## 2021-09-30 RX ORDER — FAMOTIDINE 10 MG/ML
20 INJECTION INTRAVENOUS
Status: CANCELLED | OUTPATIENT
Start: 2021-10-01

## 2021-09-30 RX ORDER — ACETAMINOPHEN 325 MG/1
650 TABLET ORAL
Status: CANCELLED | OUTPATIENT
Start: 2021-10-01

## 2021-09-30 RX ORDER — SODIUM CHLORIDE 0.9 % (FLUSH) 0.9 %
10 SYRINGE (ML) INJECTION
Status: CANCELLED | OUTPATIENT
Start: 2021-10-01

## 2021-09-30 RX ADMIN — DENOSUMAB 60 MG: 60 INJECTION SUBCUTANEOUS at 03:09

## 2021-10-05 ENCOUNTER — PATIENT MESSAGE (OUTPATIENT)
Dept: ADMINISTRATIVE | Facility: HOSPITAL | Age: 76
End: 2021-10-05

## 2021-10-22 ENCOUNTER — ANESTHESIA EVENT (OUTPATIENT)
Dept: SURGERY | Facility: OTHER | Age: 76
End: 2021-10-22
Payer: MEDICARE

## 2021-10-22 ENCOUNTER — HOSPITAL ENCOUNTER (OUTPATIENT)
Facility: OTHER | Age: 76
Discharge: HOME | End: 2021-10-23
Attending: EMERGENCY MEDICINE | Admitting: EMERGENCY MEDICINE
Payer: MEDICARE

## 2021-10-22 ENCOUNTER — ANESTHESIA (OUTPATIENT)
Dept: SURGERY | Facility: OTHER | Age: 76
End: 2021-10-22
Payer: MEDICARE

## 2021-10-22 DIAGNOSIS — R10.31 RIGHT LOWER QUADRANT ABDOMINAL PAIN: ICD-10-CM

## 2021-10-22 DIAGNOSIS — K35.80 ACUTE APPENDICITIS: ICD-10-CM

## 2021-10-22 DIAGNOSIS — K35.201 ACUTE APPENDICITIS WITH PERFORATION AND GENERALIZED PERITONITIS, WITHOUT ABSCESS OR GANGRENE: Primary | ICD-10-CM

## 2021-10-22 DIAGNOSIS — R10.9 ABDOMINAL PAIN: ICD-10-CM

## 2021-10-22 DIAGNOSIS — F41.1 GENERALIZED ANXIETY DISORDER: ICD-10-CM

## 2021-10-22 LAB
ALBUMIN SERPL BCP-MCNC: 4.1 G/DL (ref 3.5–5.2)
ALP SERPL-CCNC: 83 U/L (ref 55–135)
ALT SERPL W/O P-5'-P-CCNC: 11 U/L (ref 10–44)
ANION GAP SERPL CALC-SCNC: 13 MMOL/L (ref 8–16)
AST SERPL-CCNC: 16 U/L (ref 10–40)
BASOPHILS # BLD AUTO: 0.02 K/UL (ref 0–0.2)
BASOPHILS NFR BLD: 0.2 % (ref 0–1.9)
BILIRUB SERPL-MCNC: 1.5 MG/DL (ref 0.1–1)
BILIRUB UR QL STRIP: NEGATIVE
BUN SERPL-MCNC: 8 MG/DL (ref 8–23)
CALCIUM SERPL-MCNC: 9.1 MG/DL (ref 8.7–10.5)
CHLORIDE SERPL-SCNC: 100 MMOL/L (ref 95–110)
CLARITY UR: CLEAR
CO2 SERPL-SCNC: 26 MMOL/L (ref 23–29)
COLOR UR: YELLOW
CREAT SERPL-MCNC: 0.7 MG/DL (ref 0.5–1.4)
CTP QC/QA: YES
DIFFERENTIAL METHOD: ABNORMAL
EOSINOPHIL # BLD AUTO: 0 K/UL (ref 0–0.5)
EOSINOPHIL NFR BLD: 0.4 % (ref 0–8)
ERYTHROCYTE [DISTWIDTH] IN BLOOD BY AUTOMATED COUNT: 14.2 % (ref 11.5–14.5)
EST. GFR  (AFRICAN AMERICAN): >60 ML/MIN/1.73 M^2
EST. GFR  (NON AFRICAN AMERICAN): >60 ML/MIN/1.73 M^2
GLUCOSE SERPL-MCNC: 116 MG/DL (ref 70–110)
GLUCOSE UR QL STRIP: NEGATIVE
HCT VFR BLD AUTO: 42.7 % (ref 37–48.5)
HGB BLD-MCNC: 14.6 G/DL (ref 12–16)
HGB UR QL STRIP: ABNORMAL
IMM GRANULOCYTES # BLD AUTO: 0.04 K/UL (ref 0–0.04)
IMM GRANULOCYTES NFR BLD AUTO: 0.4 % (ref 0–0.5)
KETONES UR QL STRIP: NEGATIVE
LEUKOCYTE ESTERASE UR QL STRIP: NEGATIVE
LYMPHOCYTES # BLD AUTO: 0.8 K/UL (ref 1–4.8)
LYMPHOCYTES NFR BLD: 8.4 % (ref 18–48)
MCH RBC QN AUTO: 31.8 PG (ref 27–31)
MCHC RBC AUTO-ENTMCNC: 34.2 G/DL (ref 32–36)
MCV RBC AUTO: 93 FL (ref 82–98)
MICROSCOPIC COMMENT: NORMAL
MONOCYTES # BLD AUTO: 0.8 K/UL (ref 0.3–1)
MONOCYTES NFR BLD: 8.1 % (ref 4–15)
NEUTROPHILS # BLD AUTO: 8.2 K/UL (ref 1.8–7.7)
NEUTROPHILS NFR BLD: 82.5 % (ref 38–73)
NITRITE UR QL STRIP: NEGATIVE
NRBC BLD-RTO: 0 /100 WBC
PH UR STRIP: 6 [PH] (ref 5–8)
PLATELET # BLD AUTO: 233 K/UL (ref 150–450)
PMV BLD AUTO: 9.5 FL (ref 9.2–12.9)
POTASSIUM SERPL-SCNC: 3.1 MMOL/L (ref 3.5–5.1)
PROT SERPL-MCNC: 7.3 G/DL (ref 6–8.4)
PROT UR QL STRIP: NEGATIVE
RBC # BLD AUTO: 4.59 M/UL (ref 4–5.4)
RBC #/AREA URNS HPF: 4 /HPF (ref 0–4)
SARS-COV-2 RDRP RESP QL NAA+PROBE: NEGATIVE
SODIUM SERPL-SCNC: 139 MMOL/L (ref 136–145)
SP GR UR STRIP: 1.01 (ref 1–1.03)
SQUAMOUS #/AREA URNS HPF: 5 /HPF
URN SPEC COLLECT METH UR: ABNORMAL
UROBILINOGEN UR STRIP-ACNC: NEGATIVE EU/DL
WBC # BLD AUTO: 9.98 K/UL (ref 3.9–12.7)

## 2021-10-22 PROCEDURE — 88304 TISSUE EXAM BY PATHOLOGIST: CPT | Performed by: PATHOLOGY

## 2021-10-22 PROCEDURE — 94761 N-INVAS EAR/PLS OXIMETRY MLT: CPT

## 2021-10-22 PROCEDURE — 71000033 HC RECOVERY, INTIAL HOUR: Performed by: SPECIALIST

## 2021-10-22 PROCEDURE — G0378 HOSPITAL OBSERVATION PER HR: HCPCS

## 2021-10-22 PROCEDURE — 80053 COMPREHEN METABOLIC PANEL: CPT | Performed by: PHYSICIAN ASSISTANT

## 2021-10-22 PROCEDURE — 44970 PR LAP,APPENDECTOMY: ICD-10-PCS | Mod: ,,, | Performed by: SPECIALIST

## 2021-10-22 PROCEDURE — 88304 TISSUE EXAM BY PATHOLOGIST: CPT | Mod: 26,,, | Performed by: PATHOLOGY

## 2021-10-22 PROCEDURE — 99204 PR OFFICE/OUTPT VISIT, NEW, LEVL IV, 45-59 MIN: ICD-10-PCS | Mod: 57,,, | Performed by: SPECIALIST

## 2021-10-22 PROCEDURE — 93005 ELECTROCARDIOGRAM TRACING: CPT | Mod: 59

## 2021-10-22 PROCEDURE — 36000709 HC OR TIME LEV III EA ADD 15 MIN: Performed by: SPECIALIST

## 2021-10-22 PROCEDURE — 85025 COMPLETE CBC W/AUTO DIFF WBC: CPT | Performed by: PHYSICIAN ASSISTANT

## 2021-10-22 PROCEDURE — U0002 COVID-19 LAB TEST NON-CDC: HCPCS | Performed by: NURSE PRACTITIONER

## 2021-10-22 PROCEDURE — 93010 EKG 12-LEAD: ICD-10-PCS | Mod: ,,, | Performed by: INTERNAL MEDICINE

## 2021-10-22 PROCEDURE — 27201423 OPTIME MED/SURG SUP & DEVICES STERILE SUPPLY: Performed by: SPECIALIST

## 2021-10-22 PROCEDURE — 37000009 HC ANESTHESIA EA ADD 15 MINS: Performed by: SPECIALIST

## 2021-10-22 PROCEDURE — 44970 LAPAROSCOPY APPENDECTOMY: CPT | Mod: ,,, | Performed by: SPECIALIST

## 2021-10-22 PROCEDURE — 63600175 PHARM REV CODE 636 W HCPCS: Performed by: NURSE ANESTHETIST, CERTIFIED REGISTERED

## 2021-10-22 PROCEDURE — 93010 ELECTROCARDIOGRAM REPORT: CPT | Mod: ,,, | Performed by: INTERNAL MEDICINE

## 2021-10-22 PROCEDURE — 81000 URINALYSIS NONAUTO W/SCOPE: CPT | Performed by: PHYSICIAN ASSISTANT

## 2021-10-22 PROCEDURE — 63600175 PHARM REV CODE 636 W HCPCS: Performed by: EMERGENCY MEDICINE

## 2021-10-22 PROCEDURE — 25000003 PHARM REV CODE 250: Performed by: NURSE ANESTHETIST, CERTIFIED REGISTERED

## 2021-10-22 PROCEDURE — 71000039 HC RECOVERY, EACH ADD'L HOUR: Performed by: SPECIALIST

## 2021-10-22 PROCEDURE — 36000708 HC OR TIME LEV III 1ST 15 MIN: Performed by: SPECIALIST

## 2021-10-22 PROCEDURE — 88304 PR  SURG PATH,LEVEL III: ICD-10-PCS | Mod: 26,,, | Performed by: PATHOLOGY

## 2021-10-22 PROCEDURE — 96375 TX/PRO/DX INJ NEW DRUG ADDON: CPT

## 2021-10-22 PROCEDURE — 25500020 PHARM REV CODE 255: Performed by: EMERGENCY MEDICINE

## 2021-10-22 PROCEDURE — 37000008 HC ANESTHESIA 1ST 15 MINUTES: Performed by: SPECIALIST

## 2021-10-22 PROCEDURE — 96374 THER/PROPH/DIAG INJ IV PUSH: CPT | Mod: 59

## 2021-10-22 PROCEDURE — 99285 EMERGENCY DEPT VISIT HI MDM: CPT | Mod: 25

## 2021-10-22 PROCEDURE — 25000003 PHARM REV CODE 250: Performed by: EMERGENCY MEDICINE

## 2021-10-22 PROCEDURE — 63600175 PHARM REV CODE 636 W HCPCS: Performed by: SPECIALIST

## 2021-10-22 PROCEDURE — 99204 OFFICE O/P NEW MOD 45 MIN: CPT | Mod: 57,,, | Performed by: SPECIALIST

## 2021-10-22 RX ORDER — SODIUM CHLORIDE, SODIUM LACTATE, POTASSIUM CHLORIDE, CALCIUM CHLORIDE 600; 310; 30; 20 MG/100ML; MG/100ML; MG/100ML; MG/100ML
INJECTION, SOLUTION INTRAVENOUS CONTINUOUS
Status: DISCONTINUED | OUTPATIENT
Start: 2021-10-22 | End: 2021-10-23 | Stop reason: HOSPADM

## 2021-10-22 RX ORDER — SUCCINYLCHOLINE CHLORIDE 20 MG/ML
INJECTION INTRAMUSCULAR; INTRAVENOUS
Status: DISCONTINUED | OUTPATIENT
Start: 2021-10-22 | End: 2021-10-22

## 2021-10-22 RX ORDER — POTASSIUM CHLORIDE 7.45 MG/ML
10 INJECTION INTRAVENOUS
Status: COMPLETED | OUTPATIENT
Start: 2021-10-22 | End: 2021-10-22

## 2021-10-22 RX ORDER — OXYCODONE HYDROCHLORIDE 5 MG/1
5 TABLET ORAL
Status: DISCONTINUED | OUTPATIENT
Start: 2021-10-22 | End: 2021-10-22 | Stop reason: HOSPADM

## 2021-10-22 RX ORDER — PROPOFOL 10 MG/ML
VIAL (ML) INTRAVENOUS
Status: DISCONTINUED | OUTPATIENT
Start: 2021-10-22 | End: 2021-10-22

## 2021-10-22 RX ORDER — SODIUM CHLORIDE 0.9 % (FLUSH) 0.9 %
3 SYRINGE (ML) INJECTION
Status: DISCONTINUED | OUTPATIENT
Start: 2021-10-22 | End: 2021-10-23 | Stop reason: HOSPADM

## 2021-10-22 RX ORDER — KETOROLAC TROMETHAMINE 30 MG/ML
INJECTION, SOLUTION INTRAMUSCULAR; INTRAVENOUS
Status: DISCONTINUED | OUTPATIENT
Start: 2021-10-22 | End: 2021-10-22

## 2021-10-22 RX ORDER — HYDROMORPHONE HYDROCHLORIDE 2 MG/ML
0.4 INJECTION, SOLUTION INTRAMUSCULAR; INTRAVENOUS; SUBCUTANEOUS EVERY 5 MIN PRN
Status: DISCONTINUED | OUTPATIENT
Start: 2021-10-22 | End: 2021-10-22 | Stop reason: HOSPADM

## 2021-10-22 RX ORDER — MEPERIDINE HYDROCHLORIDE 25 MG/ML
12.5 INJECTION INTRAMUSCULAR; INTRAVENOUS; SUBCUTANEOUS ONCE AS NEEDED
Status: DISCONTINUED | OUTPATIENT
Start: 2021-10-22 | End: 2021-10-22 | Stop reason: HOSPADM

## 2021-10-22 RX ORDER — LIDOCAINE HYDROCHLORIDE 20 MG/ML
INJECTION INTRAVENOUS
Status: DISCONTINUED | OUTPATIENT
Start: 2021-10-22 | End: 2021-10-22

## 2021-10-22 RX ORDER — ACETAMINOPHEN 10 MG/ML
INJECTION, SOLUTION INTRAVENOUS
Status: DISCONTINUED | OUTPATIENT
Start: 2021-10-22 | End: 2021-10-22

## 2021-10-22 RX ORDER — ROCURONIUM BROMIDE 10 MG/ML
INJECTION, SOLUTION INTRAVENOUS
Status: DISCONTINUED | OUTPATIENT
Start: 2021-10-22 | End: 2021-10-22

## 2021-10-22 RX ORDER — ONDANSETRON 2 MG/ML
4 INJECTION INTRAMUSCULAR; INTRAVENOUS DAILY PRN
Status: DISCONTINUED | OUTPATIENT
Start: 2021-10-22 | End: 2021-10-22 | Stop reason: HOSPADM

## 2021-10-22 RX ORDER — FENTANYL CITRATE 50 UG/ML
INJECTION, SOLUTION INTRAMUSCULAR; INTRAVENOUS
Status: DISCONTINUED | OUTPATIENT
Start: 2021-10-22 | End: 2021-10-22

## 2021-10-22 RX ORDER — ONDANSETRON 2 MG/ML
INJECTION INTRAMUSCULAR; INTRAVENOUS
Status: DISCONTINUED | OUTPATIENT
Start: 2021-10-22 | End: 2021-10-22

## 2021-10-22 RX ORDER — SODIUM CHLORIDE 0.9 % (FLUSH) 0.9 %
10 SYRINGE (ML) INJECTION
Status: DISCONTINUED | OUTPATIENT
Start: 2021-10-22 | End: 2021-10-23 | Stop reason: HOSPADM

## 2021-10-22 RX ORDER — TALC
6 POWDER (GRAM) TOPICAL NIGHTLY PRN
Status: DISCONTINUED | OUTPATIENT
Start: 2021-10-22 | End: 2021-10-23 | Stop reason: HOSPADM

## 2021-10-22 RX ORDER — PHENYLEPHRINE HYDROCHLORIDE 10 MG/ML
INJECTION INTRAVENOUS
Status: DISCONTINUED | OUTPATIENT
Start: 2021-10-22 | End: 2021-10-22

## 2021-10-22 RX ORDER — DEXAMETHASONE SODIUM PHOSPHATE 4 MG/ML
INJECTION, SOLUTION INTRA-ARTICULAR; INTRALESIONAL; INTRAMUSCULAR; INTRAVENOUS; SOFT TISSUE
Status: DISCONTINUED | OUTPATIENT
Start: 2021-10-22 | End: 2021-10-22

## 2021-10-22 RX ADMIN — FENTANYL CITRATE 50 MCG: 50 INJECTION, SOLUTION INTRAMUSCULAR; INTRAVENOUS at 07:10

## 2021-10-22 RX ADMIN — PIPERACILLIN AND TAZOBACTAM 4.5 G: 4; .5 INJECTION, POWDER, LYOPHILIZED, FOR SOLUTION INTRAVENOUS; PARENTERAL at 05:10

## 2021-10-22 RX ADMIN — DEXAMETHASONE SODIUM PHOSPHATE 8 MG: 4 INJECTION, SOLUTION INTRAMUSCULAR; INTRAVENOUS at 07:10

## 2021-10-22 RX ADMIN — PROPOFOL 150 MG: 10 INJECTION, EMULSION INTRAVENOUS at 07:10

## 2021-10-22 RX ADMIN — GLYCOPYRROLATE 0.2 MG: 0.2 INJECTION, SOLUTION INTRAMUSCULAR; INTRAVITREAL at 07:10

## 2021-10-22 RX ADMIN — LIDOCAINE HYDROCHLORIDE 100 MG: 20 INJECTION, SOLUTION INTRAVENOUS at 07:10

## 2021-10-22 RX ADMIN — SODIUM CHLORIDE 500 ML: 0.9 INJECTION, SOLUTION INTRAVENOUS at 05:10

## 2021-10-22 RX ADMIN — PHENYLEPHRINE HYDROCHLORIDE 100 MCG: 10 INJECTION INTRAVENOUS at 07:10

## 2021-10-22 RX ADMIN — ONDANSETRON HYDROCHLORIDE 4 MG: 2 INJECTION INTRAMUSCULAR; INTRAVENOUS at 07:10

## 2021-10-22 RX ADMIN — FENTANYL CITRATE 100 MCG: 50 INJECTION, SOLUTION INTRAMUSCULAR; INTRAVENOUS at 07:10

## 2021-10-22 RX ADMIN — PHENYLEPHRINE HYDROCHLORIDE 200 MCG: 10 INJECTION INTRAVENOUS at 07:10

## 2021-10-22 RX ADMIN — KETOROLAC TROMETHAMINE 15 MG: 30 INJECTION, SOLUTION INTRAMUSCULAR; INTRAVENOUS at 08:10

## 2021-10-22 RX ADMIN — ACETAMINOPHEN 1000 MG: 10 INJECTION, SOLUTION INTRAVENOUS at 07:10

## 2021-10-22 RX ADMIN — POTASSIUM CHLORIDE 10 MEQ: 7.46 INJECTION, SOLUTION INTRAVENOUS at 06:10

## 2021-10-22 RX ADMIN — SUGAMMADEX 200 MG: 100 INJECTION, SOLUTION INTRAVENOUS at 08:10

## 2021-10-22 RX ADMIN — SUCCINYLCHOLINE CHLORIDE 100 MG: 20 INJECTION, SOLUTION INTRAMUSCULAR; INTRAVENOUS at 07:10

## 2021-10-22 RX ADMIN — ROCURONIUM BROMIDE 20 MG: 10 INJECTION, SOLUTION INTRAVENOUS at 07:10

## 2021-10-22 RX ADMIN — SODIUM CHLORIDE, SODIUM LACTATE, POTASSIUM CHLORIDE, AND CALCIUM CHLORIDE: .6; .31; .03; .02 INJECTION, SOLUTION INTRAVENOUS at 10:10

## 2021-10-22 RX ADMIN — IOHEXOL 75 ML: 350 INJECTION, SOLUTION INTRAVENOUS at 04:10

## 2021-10-23 VITALS
RESPIRATION RATE: 16 BRPM | TEMPERATURE: 98 F | HEART RATE: 63 BPM | HEIGHT: 66 IN | SYSTOLIC BLOOD PRESSURE: 92 MMHG | WEIGHT: 142 LBS | OXYGEN SATURATION: 97 % | BODY MASS INDEX: 22.82 KG/M2 | DIASTOLIC BLOOD PRESSURE: 50 MMHG

## 2021-10-23 PROCEDURE — 27000221 HC OXYGEN, UP TO 24 HOURS

## 2021-10-23 PROCEDURE — G0378 HOSPITAL OBSERVATION PER HR: HCPCS

## 2021-10-23 PROCEDURE — 94761 N-INVAS EAR/PLS OXIMETRY MLT: CPT

## 2021-10-23 PROCEDURE — 25000003 PHARM REV CODE 250: Performed by: SPECIALIST

## 2021-10-23 PROCEDURE — 63600175 PHARM REV CODE 636 W HCPCS: Performed by: SPECIALIST

## 2021-10-23 RX ADMIN — PIPERACILLIN AND TAZOBACTAM 4.5 G: 4; .5 INJECTION, POWDER, LYOPHILIZED, FOR SOLUTION INTRAVENOUS; PARENTERAL at 02:10

## 2021-10-23 RX ADMIN — PIPERACILLIN AND TAZOBACTAM 4.5 G: 4; .5 INJECTION, POWDER, LYOPHILIZED, FOR SOLUTION INTRAVENOUS; PARENTERAL at 09:10

## 2021-10-28 LAB
FINAL PATHOLOGIC DIAGNOSIS: NORMAL
GROSS: NORMAL
Lab: NORMAL

## 2021-11-03 ENCOUNTER — PATIENT MESSAGE (OUTPATIENT)
Dept: NEUROLOGY | Facility: CLINIC | Age: 76
End: 2021-11-03
Payer: MEDICARE

## 2021-11-08 ENCOUNTER — INFUSION (OUTPATIENT)
Dept: INFUSION THERAPY | Facility: OTHER | Age: 76
End: 2021-11-08
Attending: STUDENT IN AN ORGANIZED HEALTH CARE EDUCATION/TRAINING PROGRAM
Payer: MEDICARE

## 2021-11-08 VITALS
HEART RATE: 67 BPM | OXYGEN SATURATION: 100 % | HEIGHT: 66 IN | BODY MASS INDEX: 23.17 KG/M2 | SYSTOLIC BLOOD PRESSURE: 141 MMHG | TEMPERATURE: 98 F | RESPIRATION RATE: 20 BRPM | DIASTOLIC BLOOD PRESSURE: 67 MMHG | WEIGHT: 144.19 LBS

## 2021-11-08 DIAGNOSIS — G61.81 CHRONIC INFLAMMATORY DEMYELINATING POLYNEUROPATHY: Primary | ICD-10-CM

## 2021-11-08 PROCEDURE — 96366 THER/PROPH/DIAG IV INF ADDON: CPT

## 2021-11-08 PROCEDURE — 96365 THER/PROPH/DIAG IV INF INIT: CPT

## 2021-11-08 PROCEDURE — 96375 TX/PRO/DX INJ NEW DRUG ADDON: CPT

## 2021-11-08 PROCEDURE — 96367 TX/PROPH/DG ADDL SEQ IV INF: CPT

## 2021-11-08 PROCEDURE — 63600175 PHARM REV CODE 636 W HCPCS: Performed by: PSYCHIATRY & NEUROLOGY

## 2021-11-08 PROCEDURE — 25000003 PHARM REV CODE 250: Performed by: PSYCHIATRY & NEUROLOGY

## 2021-11-08 RX ORDER — FAMOTIDINE 10 MG/ML
20 INJECTION INTRAVENOUS
Status: COMPLETED | OUTPATIENT
Start: 2021-11-08 | End: 2021-11-08

## 2021-11-08 RX ORDER — HEPARIN 100 UNIT/ML
500 SYRINGE INTRAVENOUS
Status: DISCONTINUED | OUTPATIENT
Start: 2021-11-08 | End: 2021-11-08 | Stop reason: HOSPADM

## 2021-11-08 RX ORDER — ACETAMINOPHEN 325 MG/1
650 TABLET ORAL
Status: CANCELLED | OUTPATIENT
Start: 2021-12-01

## 2021-11-08 RX ORDER — ACETAMINOPHEN 325 MG/1
650 TABLET ORAL
Status: COMPLETED | OUTPATIENT
Start: 2021-11-08 | End: 2021-11-08

## 2021-11-08 RX ORDER — HEPARIN 100 UNIT/ML
500 SYRINGE INTRAVENOUS
Status: CANCELLED | OUTPATIENT
Start: 2021-12-01

## 2021-11-08 RX ORDER — SODIUM CHLORIDE 0.9 % (FLUSH) 0.9 %
10 SYRINGE (ML) INJECTION
Status: CANCELLED | OUTPATIENT
Start: 2021-12-01

## 2021-11-08 RX ORDER — FAMOTIDINE 10 MG/ML
20 INJECTION INTRAVENOUS
Status: CANCELLED | OUTPATIENT
Start: 2021-12-01

## 2021-11-08 RX ORDER — SODIUM CHLORIDE 0.9 % (FLUSH) 0.9 %
10 SYRINGE (ML) INJECTION
Status: DISCONTINUED | OUTPATIENT
Start: 2021-11-08 | End: 2021-11-08 | Stop reason: HOSPADM

## 2021-11-08 RX ADMIN — FAMOTIDINE 20 MG: 10 INJECTION INTRAVENOUS at 09:11

## 2021-11-08 RX ADMIN — HUMAN IMMUNOGLOBULIN G 30 G: 10 LIQUID INTRAVENOUS at 10:11

## 2021-11-08 RX ADMIN — SODIUM CHLORIDE: 0.9 INJECTION, SOLUTION INTRAVENOUS at 09:11

## 2021-11-08 RX ADMIN — ACETAMINOPHEN 650 MG: 325 TABLET ORAL at 09:11

## 2021-11-08 RX ADMIN — DIPHENHYDRAMINE HYDROCHLORIDE 50 MG: 50 INJECTION, SOLUTION INTRAMUSCULAR; INTRAVENOUS at 09:11

## 2021-11-09 ENCOUNTER — INFUSION (OUTPATIENT)
Dept: INFUSION THERAPY | Facility: OTHER | Age: 76
End: 2021-11-09
Attending: STUDENT IN AN ORGANIZED HEALTH CARE EDUCATION/TRAINING PROGRAM
Payer: MEDICARE

## 2021-11-09 VITALS
HEIGHT: 66 IN | DIASTOLIC BLOOD PRESSURE: 63 MMHG | WEIGHT: 144.19 LBS | SYSTOLIC BLOOD PRESSURE: 116 MMHG | TEMPERATURE: 98 F | OXYGEN SATURATION: 97 % | BODY MASS INDEX: 23.17 KG/M2 | RESPIRATION RATE: 17 BRPM | HEART RATE: 59 BPM

## 2021-11-09 DIAGNOSIS — G61.81 CHRONIC INFLAMMATORY DEMYELINATING POLYNEUROPATHY: Primary | ICD-10-CM

## 2021-11-09 PROCEDURE — 63600175 PHARM REV CODE 636 W HCPCS: Performed by: PSYCHIATRY & NEUROLOGY

## 2021-11-09 PROCEDURE — 96365 THER/PROPH/DIAG IV INF INIT: CPT

## 2021-11-09 PROCEDURE — 96366 THER/PROPH/DIAG IV INF ADDON: CPT

## 2021-11-09 PROCEDURE — 25000003 PHARM REV CODE 250: Performed by: PSYCHIATRY & NEUROLOGY

## 2021-11-09 PROCEDURE — 96375 TX/PRO/DX INJ NEW DRUG ADDON: CPT

## 2021-11-09 PROCEDURE — 96367 TX/PROPH/DG ADDL SEQ IV INF: CPT

## 2021-11-09 RX ORDER — HEPARIN 100 UNIT/ML
500 SYRINGE INTRAVENOUS
Status: DISCONTINUED | OUTPATIENT
Start: 2021-11-09 | End: 2021-11-09 | Stop reason: HOSPADM

## 2021-11-09 RX ORDER — SODIUM CHLORIDE 0.9 % (FLUSH) 0.9 %
10 SYRINGE (ML) INJECTION
Status: DISCONTINUED | OUTPATIENT
Start: 2021-11-09 | End: 2021-11-09 | Stop reason: HOSPADM

## 2021-11-09 RX ORDER — FAMOTIDINE 10 MG/ML
20 INJECTION INTRAVENOUS
Status: CANCELLED | OUTPATIENT
Start: 2021-12-06

## 2021-11-09 RX ORDER — SODIUM CHLORIDE 0.9 % (FLUSH) 0.9 %
10 SYRINGE (ML) INJECTION
Status: CANCELLED | OUTPATIENT
Start: 2021-12-06

## 2021-11-09 RX ORDER — HEPARIN 100 UNIT/ML
500 SYRINGE INTRAVENOUS
Status: CANCELLED | OUTPATIENT
Start: 2021-12-06

## 2021-11-09 RX ORDER — ACETAMINOPHEN 325 MG/1
650 TABLET ORAL
Status: COMPLETED | OUTPATIENT
Start: 2021-11-09 | End: 2021-11-09

## 2021-11-09 RX ORDER — ACETAMINOPHEN 325 MG/1
650 TABLET ORAL
Status: CANCELLED | OUTPATIENT
Start: 2021-12-06

## 2021-11-09 RX ORDER — FAMOTIDINE 10 MG/ML
20 INJECTION INTRAVENOUS
Status: COMPLETED | OUTPATIENT
Start: 2021-11-09 | End: 2021-11-09

## 2021-11-09 RX ADMIN — DIPHENHYDRAMINE HYDROCHLORIDE 50 MG: 50 INJECTION, SOLUTION INTRAMUSCULAR; INTRAVENOUS at 08:11

## 2021-11-09 RX ADMIN — FAMOTIDINE 20 MG: 10 INJECTION INTRAVENOUS at 08:11

## 2021-11-09 RX ADMIN — SODIUM CHLORIDE: 0.9 INJECTION, SOLUTION INTRAVENOUS at 08:11

## 2021-11-09 RX ADMIN — HUMAN IMMUNOGLOBULIN G 30 G: 20 LIQUID INTRAVENOUS at 09:11

## 2021-11-09 RX ADMIN — ACETAMINOPHEN 650 MG: 325 TABLET ORAL at 08:11

## 2021-11-10 ENCOUNTER — INFUSION (OUTPATIENT)
Dept: INFUSION THERAPY | Facility: OTHER | Age: 76
End: 2021-11-10
Attending: STUDENT IN AN ORGANIZED HEALTH CARE EDUCATION/TRAINING PROGRAM
Payer: MEDICARE

## 2021-11-10 VITALS
OXYGEN SATURATION: 99 % | WEIGHT: 144.19 LBS | BODY MASS INDEX: 23.17 KG/M2 | DIASTOLIC BLOOD PRESSURE: 70 MMHG | TEMPERATURE: 98 F | HEART RATE: 63 BPM | SYSTOLIC BLOOD PRESSURE: 142 MMHG | RESPIRATION RATE: 17 BRPM | HEIGHT: 66 IN

## 2021-11-10 DIAGNOSIS — G61.81 CHRONIC INFLAMMATORY DEMYELINATING POLYNEUROPATHY: Primary | ICD-10-CM

## 2021-11-10 PROCEDURE — 96367 TX/PROPH/DG ADDL SEQ IV INF: CPT

## 2021-11-10 PROCEDURE — 96365 THER/PROPH/DIAG IV INF INIT: CPT

## 2021-11-10 PROCEDURE — 96366 THER/PROPH/DIAG IV INF ADDON: CPT

## 2021-11-10 PROCEDURE — 96375 TX/PRO/DX INJ NEW DRUG ADDON: CPT

## 2021-11-10 PROCEDURE — 63600175 PHARM REV CODE 636 W HCPCS: Performed by: PSYCHIATRY & NEUROLOGY

## 2021-11-10 PROCEDURE — 25000003 PHARM REV CODE 250: Performed by: PSYCHIATRY & NEUROLOGY

## 2021-11-10 RX ORDER — HEPARIN 100 UNIT/ML
500 SYRINGE INTRAVENOUS
Status: DISCONTINUED | OUTPATIENT
Start: 2021-11-10 | End: 2021-11-10 | Stop reason: HOSPADM

## 2021-11-10 RX ORDER — ACETAMINOPHEN 325 MG/1
650 TABLET ORAL
Status: CANCELLED | OUTPATIENT
Start: 2021-12-07

## 2021-11-10 RX ORDER — FAMOTIDINE 10 MG/ML
20 INJECTION INTRAVENOUS
Status: COMPLETED | OUTPATIENT
Start: 2021-11-10 | End: 2021-11-10

## 2021-11-10 RX ORDER — FAMOTIDINE 10 MG/ML
20 INJECTION INTRAVENOUS
Status: CANCELLED | OUTPATIENT
Start: 2021-12-07

## 2021-11-10 RX ORDER — HEPARIN 100 UNIT/ML
500 SYRINGE INTRAVENOUS
Status: CANCELLED | OUTPATIENT
Start: 2021-12-07

## 2021-11-10 RX ORDER — SODIUM CHLORIDE 0.9 % (FLUSH) 0.9 %
10 SYRINGE (ML) INJECTION
Status: CANCELLED | OUTPATIENT
Start: 2021-12-07

## 2021-11-10 RX ORDER — SODIUM CHLORIDE 0.9 % (FLUSH) 0.9 %
10 SYRINGE (ML) INJECTION
Status: DISCONTINUED | OUTPATIENT
Start: 2021-11-10 | End: 2021-11-10 | Stop reason: HOSPADM

## 2021-11-10 RX ORDER — ACETAMINOPHEN 325 MG/1
650 TABLET ORAL
Status: COMPLETED | OUTPATIENT
Start: 2021-11-10 | End: 2021-11-10

## 2021-11-10 RX ADMIN — HUMAN IMMUNOGLOBULIN G 30 G: 20 LIQUID INTRAVENOUS at 09:11

## 2021-11-10 RX ADMIN — DIPHENHYDRAMINE HYDROCHLORIDE 50 MG: 50 INJECTION, SOLUTION INTRAMUSCULAR; INTRAVENOUS at 08:11

## 2021-11-10 RX ADMIN — SODIUM CHLORIDE: 0.9 INJECTION, SOLUTION INTRAVENOUS at 08:11

## 2021-11-10 RX ADMIN — FAMOTIDINE 20 MG: 10 INJECTION INTRAVENOUS at 08:11

## 2021-11-10 RX ADMIN — ACETAMINOPHEN 650 MG: 325 TABLET ORAL at 08:11

## 2021-11-12 ENCOUNTER — PATIENT MESSAGE (OUTPATIENT)
Dept: NEUROLOGY | Facility: CLINIC | Age: 76
End: 2021-11-12
Payer: MEDICARE

## 2021-11-12 ENCOUNTER — INFUSION (OUTPATIENT)
Dept: INFUSION THERAPY | Facility: OTHER | Age: 76
End: 2021-11-12
Attending: STUDENT IN AN ORGANIZED HEALTH CARE EDUCATION/TRAINING PROGRAM
Payer: MEDICARE

## 2021-11-12 VITALS
TEMPERATURE: 98 F | OXYGEN SATURATION: 99 % | SYSTOLIC BLOOD PRESSURE: 156 MMHG | DIASTOLIC BLOOD PRESSURE: 74 MMHG | HEART RATE: 60 BPM | RESPIRATION RATE: 20 BRPM

## 2021-11-12 DIAGNOSIS — G61.81 CHRONIC INFLAMMATORY DEMYELINATING POLYNEUROPATHY: Primary | ICD-10-CM

## 2021-11-12 PROCEDURE — 96367 TX/PROPH/DG ADDL SEQ IV INF: CPT

## 2021-11-12 PROCEDURE — 96365 THER/PROPH/DIAG IV INF INIT: CPT

## 2021-11-12 PROCEDURE — 25000003 PHARM REV CODE 250: Performed by: PSYCHIATRY & NEUROLOGY

## 2021-11-12 PROCEDURE — 63600175 PHARM REV CODE 636 W HCPCS: Performed by: PSYCHIATRY & NEUROLOGY

## 2021-11-12 PROCEDURE — 96366 THER/PROPH/DIAG IV INF ADDON: CPT

## 2021-11-12 PROCEDURE — 96375 TX/PRO/DX INJ NEW DRUG ADDON: CPT

## 2021-11-12 RX ORDER — FAMOTIDINE 10 MG/ML
20 INJECTION INTRAVENOUS
Status: COMPLETED | OUTPATIENT
Start: 2021-11-12 | End: 2021-11-12

## 2021-11-12 RX ORDER — HEPARIN 100 UNIT/ML
500 SYRINGE INTRAVENOUS
Status: CANCELLED | OUTPATIENT
Start: 2021-12-08

## 2021-11-12 RX ORDER — ACETAMINOPHEN 325 MG/1
650 TABLET ORAL
Status: COMPLETED | OUTPATIENT
Start: 2021-11-12 | End: 2021-11-12

## 2021-11-12 RX ORDER — FAMOTIDINE 10 MG/ML
20 INJECTION INTRAVENOUS
Status: CANCELLED | OUTPATIENT
Start: 2021-12-08

## 2021-11-12 RX ORDER — ACETAMINOPHEN 325 MG/1
650 TABLET ORAL
Status: CANCELLED | OUTPATIENT
Start: 2021-12-08

## 2021-11-12 RX ORDER — SODIUM CHLORIDE 0.9 % (FLUSH) 0.9 %
10 SYRINGE (ML) INJECTION
Status: DISCONTINUED | OUTPATIENT
Start: 2021-11-12 | End: 2021-11-12 | Stop reason: HOSPADM

## 2021-11-12 RX ORDER — HEPARIN 100 UNIT/ML
500 SYRINGE INTRAVENOUS
Status: DISCONTINUED | OUTPATIENT
Start: 2021-11-12 | End: 2021-11-12 | Stop reason: HOSPADM

## 2021-11-12 RX ORDER — SODIUM CHLORIDE 0.9 % (FLUSH) 0.9 %
10 SYRINGE (ML) INJECTION
Status: CANCELLED | OUTPATIENT
Start: 2021-12-08

## 2021-11-12 RX ADMIN — SODIUM CHLORIDE: 0.9 INJECTION, SOLUTION INTRAVENOUS at 09:11

## 2021-11-12 RX ADMIN — HUMAN IMMUNOGLOBULIN G 30 G: 20 LIQUID INTRAVENOUS at 09:11

## 2021-11-12 RX ADMIN — FAMOTIDINE 20 MG: 10 INJECTION INTRAVENOUS at 09:11

## 2021-11-12 RX ADMIN — ACETAMINOPHEN 650 MG: 325 TABLET ORAL at 09:11

## 2021-11-12 RX ADMIN — DIPHENHYDRAMINE HYDROCHLORIDE 50 MG: 50 INJECTION, SOLUTION INTRAMUSCULAR; INTRAVENOUS at 09:11

## 2021-11-23 ENCOUNTER — IMMUNIZATION (OUTPATIENT)
Dept: INTERNAL MEDICINE | Facility: CLINIC | Age: 76
End: 2021-11-23
Payer: MEDICARE

## 2021-11-23 DIAGNOSIS — Z23 NEED FOR VACCINATION: Primary | ICD-10-CM

## 2021-11-23 PROCEDURE — 0064A COVID-19, MRNA, LNP-S, PF, 100 MCG/0.25 ML DOSE VACCINE (MODERNA BOOSTER): CPT | Mod: PBBFAC

## 2021-12-01 ENCOUNTER — PATIENT MESSAGE (OUTPATIENT)
Dept: UROLOGY | Facility: CLINIC | Age: 76
End: 2021-12-01

## 2021-12-01 ENCOUNTER — OFFICE VISIT (OUTPATIENT)
Dept: UROLOGY | Facility: CLINIC | Age: 76
End: 2021-12-01
Attending: INTERNAL MEDICINE
Payer: MEDICARE

## 2021-12-01 VITALS
DIASTOLIC BLOOD PRESSURE: 85 MMHG | BODY MASS INDEX: 23.17 KG/M2 | SYSTOLIC BLOOD PRESSURE: 132 MMHG | WEIGHT: 144.19 LBS | HEIGHT: 66 IN | HEART RATE: 63 BPM

## 2021-12-01 DIAGNOSIS — N39.44 NOCTURNAL ENURESIS: ICD-10-CM

## 2021-12-01 DIAGNOSIS — R31.29 MICROHEMATURIA: ICD-10-CM

## 2021-12-01 DIAGNOSIS — N39.41 URGE INCONTINENCE: Primary | ICD-10-CM

## 2021-12-01 LAB
BACTERIA #/AREA URNS AUTO: ABNORMAL /HPF
HYALINE CASTS UR QL AUTO: 1 /LPF
MICROSCOPIC COMMENT: ABNORMAL
RBC #/AREA URNS AUTO: 9 /HPF (ref 0–4)
SQUAMOUS #/AREA URNS AUTO: 5 /HPF
WBC #/AREA URNS AUTO: 3 /HPF (ref 0–5)

## 2021-12-01 PROCEDURE — 99204 OFFICE O/P NEW MOD 45 MIN: CPT | Mod: S$GLB,,, | Performed by: UROLOGY

## 2021-12-01 PROCEDURE — 81001 URINALYSIS AUTO W/SCOPE: CPT | Performed by: UROLOGY

## 2021-12-01 PROCEDURE — 99204 PR OFFICE/OUTPT VISIT, NEW, LEVL IV, 45-59 MIN: ICD-10-PCS | Mod: S$GLB,,, | Performed by: UROLOGY

## 2021-12-01 RX ORDER — FESOTERODINE FUMARATE 8 MG/1
8 TABLET, EXTENDED RELEASE ORAL DAILY
Qty: 30 TABLET | Refills: 11 | Status: SHIPPED | OUTPATIENT
Start: 2021-12-01 | End: 2022-08-18

## 2021-12-06 ENCOUNTER — INFUSION (OUTPATIENT)
Dept: INFUSION THERAPY | Facility: OTHER | Age: 76
End: 2021-12-06
Attending: STUDENT IN AN ORGANIZED HEALTH CARE EDUCATION/TRAINING PROGRAM
Payer: MEDICARE

## 2021-12-06 VITALS
RESPIRATION RATE: 16 BRPM | SYSTOLIC BLOOD PRESSURE: 125 MMHG | OXYGEN SATURATION: 100 % | BODY MASS INDEX: 23.35 KG/M2 | DIASTOLIC BLOOD PRESSURE: 69 MMHG | TEMPERATURE: 98 F | HEART RATE: 62 BPM | WEIGHT: 145.31 LBS | HEIGHT: 66 IN

## 2021-12-06 DIAGNOSIS — G61.81 CHRONIC INFLAMMATORY DEMYELINATING POLYNEUROPATHY: Primary | ICD-10-CM

## 2021-12-06 PROCEDURE — 96365 THER/PROPH/DIAG IV INF INIT: CPT

## 2021-12-06 PROCEDURE — 96367 TX/PROPH/DG ADDL SEQ IV INF: CPT

## 2021-12-06 PROCEDURE — 63600175 PHARM REV CODE 636 W HCPCS: Performed by: PSYCHIATRY & NEUROLOGY

## 2021-12-06 PROCEDURE — 96375 TX/PRO/DX INJ NEW DRUG ADDON: CPT

## 2021-12-06 PROCEDURE — 96366 THER/PROPH/DIAG IV INF ADDON: CPT

## 2021-12-06 PROCEDURE — 25000003 PHARM REV CODE 250: Performed by: PSYCHIATRY & NEUROLOGY

## 2021-12-06 RX ORDER — SODIUM CHLORIDE 0.9 % (FLUSH) 0.9 %
10 SYRINGE (ML) INJECTION
Status: CANCELLED | OUTPATIENT
Start: 2021-12-10

## 2021-12-06 RX ORDER — FAMOTIDINE 10 MG/ML
20 INJECTION INTRAVENOUS
Status: CANCELLED | OUTPATIENT
Start: 2021-12-10

## 2021-12-06 RX ORDER — FAMOTIDINE 10 MG/ML
20 INJECTION INTRAVENOUS
Status: COMPLETED | OUTPATIENT
Start: 2021-12-06 | End: 2021-12-06

## 2021-12-06 RX ORDER — ACETAMINOPHEN 325 MG/1
650 TABLET ORAL
Status: CANCELLED | OUTPATIENT
Start: 2021-12-10

## 2021-12-06 RX ORDER — HEPARIN 100 UNIT/ML
500 SYRINGE INTRAVENOUS
Status: DISCONTINUED | OUTPATIENT
Start: 2021-12-06 | End: 2021-12-06 | Stop reason: HOSPADM

## 2021-12-06 RX ORDER — ACETAMINOPHEN 325 MG/1
650 TABLET ORAL
Status: COMPLETED | OUTPATIENT
Start: 2021-12-06 | End: 2021-12-06

## 2021-12-06 RX ORDER — SODIUM CHLORIDE 0.9 % (FLUSH) 0.9 %
10 SYRINGE (ML) INJECTION
Status: DISCONTINUED | OUTPATIENT
Start: 2021-12-06 | End: 2021-12-06 | Stop reason: HOSPADM

## 2021-12-06 RX ORDER — HEPARIN 100 UNIT/ML
500 SYRINGE INTRAVENOUS
Status: CANCELLED | OUTPATIENT
Start: 2021-12-10

## 2021-12-06 RX ADMIN — FAMOTIDINE 20 MG: 10 INJECTION INTRAVENOUS at 09:12

## 2021-12-06 RX ADMIN — DIPHENHYDRAMINE HYDROCHLORIDE 50 MG: 50 INJECTION, SOLUTION INTRAMUSCULAR; INTRAVENOUS at 09:12

## 2021-12-06 RX ADMIN — ACETAMINOPHEN 650 MG: 325 TABLET ORAL at 09:12

## 2021-12-06 RX ADMIN — SODIUM CHLORIDE: 0.9 INJECTION, SOLUTION INTRAVENOUS at 09:12

## 2021-12-06 RX ADMIN — HUMAN IMMUNOGLOBULIN G 30 G: 20 LIQUID INTRAVENOUS at 09:12

## 2021-12-07 ENCOUNTER — INFUSION (OUTPATIENT)
Dept: INFUSION THERAPY | Facility: OTHER | Age: 76
End: 2021-12-07
Attending: STUDENT IN AN ORGANIZED HEALTH CARE EDUCATION/TRAINING PROGRAM
Payer: MEDICARE

## 2021-12-07 VITALS
SYSTOLIC BLOOD PRESSURE: 135 MMHG | BODY MASS INDEX: 23.35 KG/M2 | HEIGHT: 66 IN | WEIGHT: 145.31 LBS | HEART RATE: 66 BPM | TEMPERATURE: 98 F | RESPIRATION RATE: 16 BRPM | OXYGEN SATURATION: 97 % | DIASTOLIC BLOOD PRESSURE: 67 MMHG

## 2021-12-07 DIAGNOSIS — G61.81 CHRONIC INFLAMMATORY DEMYELINATING POLYNEUROPATHY: Primary | ICD-10-CM

## 2021-12-07 PROCEDURE — 96365 THER/PROPH/DIAG IV INF INIT: CPT

## 2021-12-07 PROCEDURE — 96366 THER/PROPH/DIAG IV INF ADDON: CPT

## 2021-12-07 PROCEDURE — 25000003 PHARM REV CODE 250: Performed by: PSYCHIATRY & NEUROLOGY

## 2021-12-07 PROCEDURE — 63600175 PHARM REV CODE 636 W HCPCS: Performed by: PSYCHIATRY & NEUROLOGY

## 2021-12-07 PROCEDURE — 96375 TX/PRO/DX INJ NEW DRUG ADDON: CPT

## 2021-12-07 PROCEDURE — 96367 TX/PROPH/DG ADDL SEQ IV INF: CPT

## 2021-12-07 RX ORDER — FAMOTIDINE 10 MG/ML
20 INJECTION INTRAVENOUS
Status: CANCELLED | OUTPATIENT
Start: 2022-01-03

## 2021-12-07 RX ORDER — FAMOTIDINE 10 MG/ML
20 INJECTION INTRAVENOUS
Status: COMPLETED | OUTPATIENT
Start: 2021-12-07 | End: 2021-12-07

## 2021-12-07 RX ORDER — SODIUM CHLORIDE 0.9 % (FLUSH) 0.9 %
10 SYRINGE (ML) INJECTION
Status: CANCELLED | OUTPATIENT
Start: 2022-01-03

## 2021-12-07 RX ORDER — ACETAMINOPHEN 325 MG/1
650 TABLET ORAL
Status: CANCELLED | OUTPATIENT
Start: 2022-01-03

## 2021-12-07 RX ORDER — SODIUM CHLORIDE 0.9 % (FLUSH) 0.9 %
10 SYRINGE (ML) INJECTION
Status: DISCONTINUED | OUTPATIENT
Start: 2021-12-07 | End: 2021-12-07 | Stop reason: HOSPADM

## 2021-12-07 RX ORDER — HEPARIN 100 UNIT/ML
500 SYRINGE INTRAVENOUS
Status: DISCONTINUED | OUTPATIENT
Start: 2021-12-07 | End: 2021-12-07 | Stop reason: HOSPADM

## 2021-12-07 RX ORDER — ACETAMINOPHEN 325 MG/1
650 TABLET ORAL
Status: COMPLETED | OUTPATIENT
Start: 2021-12-07 | End: 2021-12-07

## 2021-12-07 RX ORDER — HEPARIN 100 UNIT/ML
500 SYRINGE INTRAVENOUS
Status: CANCELLED | OUTPATIENT
Start: 2022-01-03

## 2021-12-07 RX ADMIN — HUMAN IMMUNOGLOBULIN G 30 G: 20 LIQUID INTRAVENOUS at 10:12

## 2021-12-07 RX ADMIN — SODIUM CHLORIDE: 0.9 INJECTION, SOLUTION INTRAVENOUS at 09:12

## 2021-12-07 RX ADMIN — ACETAMINOPHEN 650 MG: 325 TABLET ORAL at 09:12

## 2021-12-07 RX ADMIN — FAMOTIDINE 20 MG: 10 INJECTION INTRAVENOUS at 09:12

## 2021-12-07 RX ADMIN — DIPHENHYDRAMINE HYDROCHLORIDE 50 MG: 50 INJECTION, SOLUTION INTRAMUSCULAR; INTRAVENOUS at 09:12

## 2021-12-30 ENCOUNTER — PATIENT MESSAGE (OUTPATIENT)
Dept: ADMINISTRATIVE | Facility: OTHER | Age: 76
End: 2021-12-30
Payer: MEDICARE

## 2022-01-03 ENCOUNTER — INFUSION (OUTPATIENT)
Dept: INFUSION THERAPY | Facility: OTHER | Age: 77
End: 2022-01-03
Attending: STUDENT IN AN ORGANIZED HEALTH CARE EDUCATION/TRAINING PROGRAM
Payer: MEDICARE

## 2022-01-03 VITALS
HEIGHT: 69 IN | SYSTOLIC BLOOD PRESSURE: 131 MMHG | OXYGEN SATURATION: 99 % | TEMPERATURE: 98 F | DIASTOLIC BLOOD PRESSURE: 75 MMHG | HEART RATE: 63 BPM | RESPIRATION RATE: 16 BRPM | BODY MASS INDEX: 22.04 KG/M2 | WEIGHT: 148.81 LBS

## 2022-01-03 DIAGNOSIS — G61.81 CHRONIC INFLAMMATORY DEMYELINATING POLYNEUROPATHY: Primary | ICD-10-CM

## 2022-01-03 PROCEDURE — 96375 TX/PRO/DX INJ NEW DRUG ADDON: CPT

## 2022-01-03 PROCEDURE — 25000003 PHARM REV CODE 250: Performed by: PSYCHIATRY & NEUROLOGY

## 2022-01-03 PROCEDURE — 96367 TX/PROPH/DG ADDL SEQ IV INF: CPT

## 2022-01-03 PROCEDURE — 96366 THER/PROPH/DIAG IV INF ADDON: CPT

## 2022-01-03 PROCEDURE — 63600175 PHARM REV CODE 636 W HCPCS: Mod: JG | Performed by: PSYCHIATRY & NEUROLOGY

## 2022-01-03 PROCEDURE — 96365 THER/PROPH/DIAG IV INF INIT: CPT

## 2022-01-03 RX ORDER — ACETAMINOPHEN 325 MG/1
650 TABLET ORAL
Status: COMPLETED | OUTPATIENT
Start: 2022-01-03 | End: 2022-01-03

## 2022-01-03 RX ORDER — HEPARIN 100 UNIT/ML
500 SYRINGE INTRAVENOUS
Status: DISCONTINUED | OUTPATIENT
Start: 2022-01-03 | End: 2022-01-03 | Stop reason: HOSPADM

## 2022-01-03 RX ORDER — ACETAMINOPHEN 325 MG/1
650 TABLET ORAL
Status: CANCELLED | OUTPATIENT
Start: 2022-01-04

## 2022-01-03 RX ORDER — SODIUM CHLORIDE 0.9 % (FLUSH) 0.9 %
10 SYRINGE (ML) INJECTION
Status: DISCONTINUED | OUTPATIENT
Start: 2022-01-03 | End: 2022-01-03 | Stop reason: HOSPADM

## 2022-01-03 RX ORDER — FAMOTIDINE 10 MG/ML
20 INJECTION INTRAVENOUS
Status: CANCELLED | OUTPATIENT
Start: 2022-01-04

## 2022-01-03 RX ORDER — SODIUM CHLORIDE 0.9 % (FLUSH) 0.9 %
10 SYRINGE (ML) INJECTION
Status: CANCELLED | OUTPATIENT
Start: 2022-01-04

## 2022-01-03 RX ORDER — FAMOTIDINE 10 MG/ML
20 INJECTION INTRAVENOUS
Status: COMPLETED | OUTPATIENT
Start: 2022-01-03 | End: 2022-01-03

## 2022-01-03 RX ORDER — HEPARIN 100 UNIT/ML
500 SYRINGE INTRAVENOUS
Status: CANCELLED | OUTPATIENT
Start: 2022-01-04

## 2022-01-03 RX ADMIN — DIPHENHYDRAMINE HYDROCHLORIDE 50 MG: 50 INJECTION, SOLUTION INTRAMUSCULAR; INTRAVENOUS at 09:01

## 2022-01-03 RX ADMIN — ACETAMINOPHEN 650 MG: 325 TABLET ORAL at 09:01

## 2022-01-03 RX ADMIN — HUMAN IMMUNOGLOBULIN G 30 G: 20 LIQUID INTRAVENOUS at 10:01

## 2022-01-03 RX ADMIN — FAMOTIDINE 20 MG: 10 INJECTION INTRAVENOUS at 09:01

## 2022-01-03 RX ADMIN — SODIUM CHLORIDE: 0.9 INJECTION, SOLUTION INTRAVENOUS at 09:01

## 2022-01-03 NOTE — PLAN OF CARE
Vital Signs Stable, No apparent distress noted; Pt tolerated __IVIG/Privigen_ infusion w/o difficulty.  24g piv removed;No bleeding,swelling or drainage noted to the site.  Discharge instructions given;Pt verbalizes understanding. Next appointments scheduled  Discharged home per self

## 2022-01-04 ENCOUNTER — INFUSION (OUTPATIENT)
Dept: INFUSION THERAPY | Facility: OTHER | Age: 77
End: 2022-01-04
Attending: STUDENT IN AN ORGANIZED HEALTH CARE EDUCATION/TRAINING PROGRAM
Payer: MEDICARE

## 2022-01-04 VITALS
HEIGHT: 69 IN | HEART RATE: 57 BPM | OXYGEN SATURATION: 97 % | SYSTOLIC BLOOD PRESSURE: 129 MMHG | RESPIRATION RATE: 17 BRPM | TEMPERATURE: 99 F | BODY MASS INDEX: 22.04 KG/M2 | WEIGHT: 148.81 LBS | DIASTOLIC BLOOD PRESSURE: 69 MMHG

## 2022-01-04 DIAGNOSIS — G61.81 CHRONIC INFLAMMATORY DEMYELINATING POLYNEUROPATHY: Primary | ICD-10-CM

## 2022-01-04 PROCEDURE — 96366 THER/PROPH/DIAG IV INF ADDON: CPT

## 2022-01-04 PROCEDURE — 63600175 PHARM REV CODE 636 W HCPCS: Performed by: PSYCHIATRY & NEUROLOGY

## 2022-01-04 PROCEDURE — 96365 THER/PROPH/DIAG IV INF INIT: CPT

## 2022-01-04 PROCEDURE — 25000003 PHARM REV CODE 250: Performed by: PSYCHIATRY & NEUROLOGY

## 2022-01-04 PROCEDURE — 96375 TX/PRO/DX INJ NEW DRUG ADDON: CPT

## 2022-01-04 PROCEDURE — 96367 TX/PROPH/DG ADDL SEQ IV INF: CPT

## 2022-01-04 RX ORDER — SODIUM CHLORIDE 0.9 % (FLUSH) 0.9 %
10 SYRINGE (ML) INJECTION
Status: CANCELLED | OUTPATIENT
Start: 2022-02-01

## 2022-01-04 RX ORDER — ACETAMINOPHEN 325 MG/1
650 TABLET ORAL
Status: CANCELLED | OUTPATIENT
Start: 2022-02-01

## 2022-01-04 RX ORDER — FAMOTIDINE 10 MG/ML
20 INJECTION INTRAVENOUS
Status: COMPLETED | OUTPATIENT
Start: 2022-01-04 | End: 2022-01-04

## 2022-01-04 RX ORDER — HEPARIN 100 UNIT/ML
500 SYRINGE INTRAVENOUS
Status: CANCELLED | OUTPATIENT
Start: 2022-02-01

## 2022-01-04 RX ORDER — SODIUM CHLORIDE 0.9 % (FLUSH) 0.9 %
10 SYRINGE (ML) INJECTION
Status: DISCONTINUED | OUTPATIENT
Start: 2022-01-04 | End: 2022-01-04 | Stop reason: HOSPADM

## 2022-01-04 RX ORDER — ACETAMINOPHEN 325 MG/1
650 TABLET ORAL
Status: COMPLETED | OUTPATIENT
Start: 2022-01-04 | End: 2022-01-04

## 2022-01-04 RX ORDER — HEPARIN 100 UNIT/ML
500 SYRINGE INTRAVENOUS
Status: DISCONTINUED | OUTPATIENT
Start: 2022-01-04 | End: 2022-01-04 | Stop reason: HOSPADM

## 2022-01-04 RX ORDER — FAMOTIDINE 10 MG/ML
20 INJECTION INTRAVENOUS
Status: CANCELLED | OUTPATIENT
Start: 2022-02-01

## 2022-01-04 RX ADMIN — DIPHENHYDRAMINE HYDROCHLORIDE 50 MG: 50 INJECTION, SOLUTION INTRAMUSCULAR; INTRAVENOUS at 09:01

## 2022-01-04 RX ADMIN — HUMAN IMMUNOGLOBULIN G 30 G: 20 LIQUID INTRAVENOUS at 10:01

## 2022-01-04 RX ADMIN — FAMOTIDINE 20 MG: 10 INJECTION INTRAVENOUS at 09:01

## 2022-01-04 RX ADMIN — ACETAMINOPHEN 650 MG: 325 TABLET ORAL at 09:01

## 2022-01-04 RX ADMIN — SODIUM CHLORIDE: 0.9 INJECTION, SOLUTION INTRAVENOUS at 09:01

## 2022-01-04 NOTE — PLAN OF CARE
Vital Signs Stable, No apparent distress noted; Pt tolerated ___IVIG__ infusion w/o difficulty.  24g piv removed;No bleeding,swelling or drainage noted to the site;coban and gauze applied  Discharge instructions given;Pt verbalizes understanding. Next appointments scheduled  Discharged home per self

## 2022-01-05 ENCOUNTER — TELEPHONE (OUTPATIENT)
Dept: NEUROLOGY | Facility: CLINIC | Age: 77
End: 2022-01-05
Payer: MEDICARE

## 2022-01-06 ENCOUNTER — OFFICE VISIT (OUTPATIENT)
Dept: NEUROLOGY | Facility: CLINIC | Age: 77
End: 2022-01-06
Payer: MEDICARE

## 2022-01-06 VITALS
SYSTOLIC BLOOD PRESSURE: 147 MMHG | BODY MASS INDEX: 21.77 KG/M2 | HEART RATE: 62 BPM | WEIGHT: 147 LBS | HEIGHT: 69 IN | DIASTOLIC BLOOD PRESSURE: 72 MMHG

## 2022-01-06 DIAGNOSIS — E53.8 LOW SERUM VITAMIN B12: Primary | ICD-10-CM

## 2022-01-06 DIAGNOSIS — G61.81 CIDP (CHRONIC INFLAMMATORY DEMYELINATING POLYNEUROPATHY): ICD-10-CM

## 2022-01-06 PROCEDURE — 99215 OFFICE O/P EST HI 40 MIN: CPT | Mod: S$PBB,,, | Performed by: PSYCHIATRY & NEUROLOGY

## 2022-01-06 PROCEDURE — 99214 OFFICE O/P EST MOD 30 MIN: CPT | Mod: PBBFAC | Performed by: PSYCHIATRY & NEUROLOGY

## 2022-01-06 PROCEDURE — 99215 PR OFFICE/OUTPT VISIT, EST, LEVL V, 40-54 MIN: ICD-10-PCS | Mod: S$PBB,,, | Performed by: PSYCHIATRY & NEUROLOGY

## 2022-01-06 PROCEDURE — 99999 PR PBB SHADOW E&M-EST. PATIENT-LVL IV: CPT | Mod: PBBFAC,,, | Performed by: PSYCHIATRY & NEUROLOGY

## 2022-01-06 PROCEDURE — 99999 PR PBB SHADOW E&M-EST. PATIENT-LVL IV: ICD-10-PCS | Mod: PBBFAC,,, | Performed by: PSYCHIATRY & NEUROLOGY

## 2022-01-06 NOTE — PATIENT INSTRUCTIONS
Thank you for coming     CIDP - symptoms have improved   Referral to PT  Referral to OT  Evaluation in 1.5 months   Then IVIG every 8 weeks     Follow up in 8 weeks

## 2022-01-06 NOTE — PROGRESS NOTES
NEUROLOGY  Outpatient Follow up visit    [address] [address]  [phone] (office) / [phone] ( (fax)    Patient Name:  Cecilia Munguia  :  1945  MR #:  [id number]  Acct #:  [id number]    Date of Neurology Visit: 2022  Name of Neurologist: Ellen Sahni MD    Other Physicians:  Kapil León MD (Primary Care Physician); No ref. provider found (Referring)      Chief Complaint: No chief complaint on file.      History of Present Illness (HPI):  Cecilia Munguia is a 76 y.o. female presents to the Neurology Clinic for follow-up. 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     Last relapse was when she saw Dr. Johnson. She states she she developed a headache after her IVIG recently. 5/10 in intensity, sharp, throbbing at times mostly above the left ear and also has felt it in the right temporal region.   Left buttock pain has resolved  PT helped with balance and foot weakness   Sometimes she has numbness in her toes, sometimes she has numbness in her fingers.       CIDP symptoms:  Hand weakness- she can cut her food, can button her shirt   and toe weakness is unchanged  Not using a cane  She completed PT with hip replacement- 2021  1 fall 3 months ago she was going down steps and she was able to catch herself.  Her balance is not good     She got IVIG loading dose 2021  Then 1 gram /kg- 2021  Most recently on 1/3 and 2021:   Since she last saw me her gall bladder has been removed. Post surgery she developed explosive diarrhea. She  She states she has severe pain in the left groin, left buttock now the pain travels into the left leg/ ankle. This is extremely painful and prevents her from sleeping. She is not sleeping well because of this.    CIDP symptoms:  Hand weakness and toe weakness is unchanged  Not using a cane anymore  She is not doing PT and has not been able toe exercise due to  severe left buttock pain.   No falls.   -I recommend initiating therapy for CIDP given progressive weakness, change in weakness when compared 5th my any previous examination as well as examination by Dr. Johnson         Initial HPI   1/7/2021:      She was previously followed by Dr. Drake Johnson, last visit was on 11/8/2016 and this is her 1st visit with me. The patient presents with [facility] from [facility].    She reports that she developed somewhat sudden onset weakness involving her feet and hands at age 20.  She was in present at that time  She remembers feeling weak in her feet when trying to get in a school bus. Then she developed numbness in her feet. She then noted weakness in her fingers. She also has numbness along the inside of the 5th finger and the inside of her right ring finger. Sometimes she has numbness in her feet.  She remembers being in the hospital for a month. She had a LP at that time.     She was managed with oral steroids and PT. She had a LP at that time.     She was managed with Prednisone for several years.     In 1986 she came here. She was seen by Dr. Arellano who diagnosed her with CIDP. She was then followed by Dr. Deutsch. She was switched to IVIG. She gets IVIG for relapses. The longest time she went without a relapse was several years.     Relapse symptoms:   She starts feeling numbness in her hands and feet. She feels sick over all and the symptoms progress up to her legs. She has symptoms in her hands. Sometimes she has needle like senation in her face.     Current symptoms:  She has been using a cane lately because her balance is off. She does PT. Her balance has been off and since her right hip was replaced   Weakness in fingers and toes is stable. She had a fall in 2020 and says she sustained a fracture involving the left knee. She twisted her leg and fell on the sofa. She was walking backwards another time and could not catch herself and hit her back. She states she  sustained another fracture.  She has been told by her primary care physician that she has osteoporosis although the DEXA scan did not reveal the same.     She has pain in the left buttock which goes into the left thigh    Family history:   No family h/o neuropathy.     She states she was misdiagnosed previously before a diagnosis of CIDP was made.      Duration: 55 years   Location:  Bilateral hands and feet  Intensity:  Moderate  Aggravating factors: none  Relieving factors: IVIG  Frequency:daily  Timing: all day  Associated symptoms: Intermittent relapses, last relapse per notes was in 2016        Treatment to date:    Prednisone for several years then switched to IVIG for relapse only    Review of Systems:    General: Weight gain: No, Weight Loss: No, Fatigue: Yes,   Fever: No, Chills: Yes, Night Sweats: No, Insomnia: Yes, Excessive sleeping: No   Respiratory:  Cough: No, Shortness of Breath: No,   Wheezing: No, Excessive Snoring: No, Coughing up blood: No  Endocrine: Heat Intolerance: No, Cold Intolerance: Yes,   Excessive Thirst: No, Excessive Hunger: No,   Eyes:  Blurred Vision: No, Double Vision: No,   Light Sensitivity: Yes, Eye pain: No  Musculoskeletal: Muscle Aches/Pain: Yes, Joint Pain/Swelling: Yes, Muscle Cramps: Yes, Muscle Weakness: Yes, Neck Pain: No, Back Pain: No   Neurological: Difficulty Walking/Falls: Yes, Headache Migraine: No, Dizziness/Vertigo: No, Fainting: No, Difficulty with Speech: No, Weakness: Yes, Tingling/Numbness: No, Tremors: Yes, Memory Problems:Yes, Seizures: No, Difficulty Swallowing: No, Altered Taste: No.  Cardiovascular: Chest Pain: No, Shortness of Breath: No,   Palpitations: No  Gastrointestinal: Nausea/Vomiting: No, Constipation: Yes, Diarrhea: No, Bloody Stools: No   Psych/Cog:  Depression: Yes, Anxiety: Yes, Hallucinations: No, Problems Concentrating: No  : Frequent Urination: No, Incontinence: Yes, Blood of Urine: No, Urinary Infections: No, Changes in Sex Drive: No  "  ENT:Hearing Loss: Yes, Earache: No, Ringing in Ears: Yes,   Facial Pain: No, Chronic Congestion: No   Immune: Seasonal Allergies: No, Hives and/or Rashes: No  The remainder of the review of twelve body systems was reviewed and normal.    Past Medical, Surgical, Family & Social History:   Past Medical History:   Diagnosis Date    Anxiety     Balance problem     Chronic inflammatory demyelinating neuropathy     Depression     Diarrhea     Encounter for blood transfusion     GERD (gastroesophageal reflux disease)     [place] (hard of hearing)     Hyperlipidemia     Hypertension     IBS (irritable bowel syndrome)     Neuromuscular disorder     demyelinating neuropathy    Ulcer     several years ago       Home Medications:     Current Outpatient Medications:     cholestyramine (QUESTRAN) 4 gram packet, Take 4 g by mouth once daily., Disp: , Rfl:     cyanocobalamin (VITAMIN B-12) 1000 MCG tablet, Take 100 mcg by mouth once daily., Disp: , Rfl:     ergocalciferol (ERGOCALCIFEROL) 50,000 unit Cap, Take 50,000 Units by mouth every 7 days., Disp: , Rfl:     fesoterodine (TOVIAZ) 8 mg Tb24, Take 8 mg by mouth once daily., Disp: 30 tablet, Rfl: 11    hydroCHLOROthiazide (HYDRODIURIL) 12.5 MG Tab, TAKE 1 TABLET BY MOUTH EVERY DAY, Disp: 90 tablet, Rfl: 3    losartan (COZAAR) 50 MG tablet, TAKE 1 TABLET BY MOUTH EVERY DAY, Disp: 90 tablet, Rfl: 3    multivitamin (THERAGRAN) per tablet, Take 1 tablet by mouth once daily., Disp: , Rfl:     MYRBETRIQ 25 mg Tb24 ER tablet, , Disp: , Rfl:     omeprazole (PRILOSEC) 20 MG capsule, Take 20 mg by mouth Daily. , Disp: , Rfl:     sertraline (ZOLOFT) 50 MG tablet, Take 1 tablet (50 mg total) by mouth once daily., Disp: 90 tablet, Rfl: 3    simethicone (MYLICON) 80 MG chewable tablet, Take 80 mg by mouth every 6 (six) hours as needed for Flatulence., Disp: , Rfl:     Physical Examination:  BP (!) 147/72   Pulse 62   Ht 5' 9" (1.753 m)   Wt 66.7 kg (147 lb)   BMI " 21.71 kg/m²     GENERAL:  General appearance: Well, non-toxic appearing.  No apparent distress.  Fundi exam: normal.  Neck: supple.  Carotid auscultation: normal.  Heart auscultation: normal.  Peripheral pulses: normal.  Extremities: normal.    MENTAL STATUS:  Alertness, attention span & concentration: normal.  Language: normal.  Orientation to self, place & time:  normal.  Memory, recent & remote: normal.  Fund of knowledge: normal.      SPEECH:  Clear and fluent.  Follows complex commands.    CRANIAL NERVES:  Cranial Nerves II-XII were examined.  II - Visual fields: normal.  III, IV, VI: PERRL, EOMI, No ptosis, No nystagmus.  V - Facial sensation: normal.  VII - Face symmetry & mobility: normal.  VIII - Hearing: normal.  IX, X - Palate: mobile & midline.  XI - Shoulder shrug: normal.  XII - Tongue protrusion: normal.    GROSS MOTOR:  Gait & station: Bilateral foot drop- steppage gait   Tone: normal.  Abnormal movements: none.  Finger-nose & Heel-knee-shin: normal.  Rapid alternating movements & drift: normal.  Romberg: absent    MUSCLE STRENGTH:  Flexion deformities in both hands noted    Fascics Atrophy RIGHT    LEFT Atrophy Fascics     5 Neck Ext. 5       5 Neck Flex 5       5 Deltoids 5       5 Sh.Ext.Rot. 5       5 Sh.Int.Rot. 5       5 Biceps 5       5 Triceps 5       5 Forearm.Pr. 5       5 Wrist Ext. 5       5 Wrist Flex 5       4 Finger Ext. 4       5 Finger Flex 5       5 FPL 5      ++ 3+ Inteross. 3+ ++      1 APB 2                5 Iliopsoas 5       5 Hip Abduct 5       5 Hip Adduct 5       5 Quads 5       5 Hams 5       3+ Dorsiflex 3        + 5 Plantar Flex 4       4 Ankle Niko 4       3 Ankle Invert 5       2 Toe Ext. 0       4- Toe Flex 3+                         REFLEXES:    RIGHT Reflex   LEFT   0 Biceps 2+   2+ Brachiorad. 2+   2+ Triceps 2+        1+ Patellar 2+   0 Ankle 0        Down PLANTAR Down     SENSORY:  Light touch: Normal throughout.  Sharp touch: gradient to upper leg  Vibration:  lost to medial malleolus bilaterally at MM- 8 sceonds   Temperature: gradient to lower leg  Joint Position: Normal throughout.      EPL -4/5  FPL -4/5   FDI , ADM 3/5    Unable to walk on toes or heels or tandem    Negative straight leg raise test    Diagnostic Data Reviewed:    EMG/NCS at [facility] by Dr. Arellano:  December 6, 1995          Findings:  Right ulnar motor amplitudes are reduced with severely slowed conduction velocities (17 m/sec), similar to the previous study of 07/06/1994.    Impression:  Right ulnar motor conduction velocity has not changed since 7/94, suggesting no electrophysiologically detectable progression of her disease.  Her report is consistent with of minor clinical exacerbation of CIDP.      3/7/1996:  Folic acid -5  Vitamin     MRI knee without contrast on 08/03/2020:    Impression:     1. Nondisplaced intercondylar tibial eminence fracture at the distal ACL insertion.  Intact ACL without partial or full-thickness tear.  2. Nondisplaced/nondepressed comminuted intra-articular fracture of the posteromedial aspect of the lateral tibial plateau with component extending anteriorly into the aforementioned tibial eminence fracture.  3. Advanced patellar chondromalacia.  4. Large joint effusion with prominent synovitis.  5. Fusiform thickening of the tibial and common fibular nerves with associated fatty atrophy of the musculature at the superficial posterior compartment of the lower leg, findings that are consistent with sequela of the patient's known chronic inflammatory demyelinating polyneuropathy.            Assessment and Plan:    Cecilia Munguia is a 75 y.o. female presents to the Neurology Clinic for follow-up.      She reports that she developed somewhat sudden onset weakness involving her feet and hands at age 20.  She was in [place] at that time.  She thinks the weakness gradually progressed over weeks. She remembers being in the hospital for a month. She had a LP at that  time.  She was managed with oral steroids for several years.  [place] then [place] and was misdiagnosed initially but later diagnosed with CIDP by Dr. Arellano based on EMG testing.    EMG/NCS- 1 study scanned into the system from 1995 reveals significantly reduced ulnar nerve motor conduction velocity.  Unable to review waveforms.     Her examination reveals significant distal foot and hand weakness with atrophy involving her intrinsic hand muscles and right calf.  She has bilaterally absent ankle reflexes, flexion deformities at her hands.  She is hesitant to repeat the EMG/NCS as she states it was repeated several times when she was followed by .  The other differential diagnosis would be hereditary sensory motor neuropathy. She was managed with IVIG only during relapses as her examination was stable outside of relapses.  During her last visit I noted worsening weakness when compared with my previous testing, I recommended IVIG. She started the same but is hesitant to continue.    Today her examination is stable/ improved. She does not want to do monthtly IVIG infusions and agrees with IVIG infusions every 2 months. I would like to examine her between infusions and if she remains stable we can continue this regimen.         MRI knee is reveals fusiform enlargement of peroneal and tibial nerves which is consistent with CIDP.  Comprehensive neuropathy panel via [employer] is negative         Assessment:  1.  CIDP  2.  Low B12 in the past  3.  Idiopathic progressive polyneuropathy, blood work to evaluate for underlying cause within normal limits, B12 was not checked  4.  Vitamin-D deficiency        Recommendations:  1. IVIG every  2 months with close clinical evaluation based on patient preference . Today exam is stable/ improved  2.  Physical therapy referral   3.  OT referral   4.  Patient asked to contact the clinic if she has relapse symptoms  5.  Check vitamin B12 level      The patient will return to  clinic in 2  Months.    Future:  Patient previously reported osteoporosis, I will discuss the same with patient during her next visit       Time Spent: 49 minutes spent face-to-face, >50% spent advising about: counseling and/or coordination of care    This note was created with voice recognition software.  Grammatical, syntax and spelling errors may be inevitable.           Ellen Sahni MD  Medicine- Neurology, Clinical Neurophysiology

## 2022-01-23 RX ORDER — SERTRALINE HYDROCHLORIDE 50 MG/1
TABLET, FILM COATED ORAL
Qty: 90 TABLET | Refills: 3 | Status: SHIPPED | OUTPATIENT
Start: 2022-01-23 | End: 2022-11-08

## 2022-02-28 ENCOUNTER — INFUSION (OUTPATIENT)
Dept: INFUSION THERAPY | Facility: OTHER | Age: 77
End: 2022-02-28
Attending: STUDENT IN AN ORGANIZED HEALTH CARE EDUCATION/TRAINING PROGRAM
Payer: MEDICARE

## 2022-02-28 VITALS
TEMPERATURE: 98 F | SYSTOLIC BLOOD PRESSURE: 115 MMHG | HEIGHT: 69 IN | DIASTOLIC BLOOD PRESSURE: 59 MMHG | HEART RATE: 69 BPM | OXYGEN SATURATION: 98 % | BODY MASS INDEX: 21.52 KG/M2 | RESPIRATION RATE: 18 BRPM | WEIGHT: 145.31 LBS

## 2022-02-28 DIAGNOSIS — G61.81 CHRONIC INFLAMMATORY DEMYELINATING POLYNEUROPATHY: Primary | ICD-10-CM

## 2022-02-28 PROCEDURE — 96367 TX/PROPH/DG ADDL SEQ IV INF: CPT

## 2022-02-28 PROCEDURE — 96365 THER/PROPH/DIAG IV INF INIT: CPT

## 2022-02-28 PROCEDURE — 25000003 PHARM REV CODE 250: Performed by: PSYCHIATRY & NEUROLOGY

## 2022-02-28 PROCEDURE — 96366 THER/PROPH/DIAG IV INF ADDON: CPT

## 2022-02-28 PROCEDURE — 96375 TX/PRO/DX INJ NEW DRUG ADDON: CPT

## 2022-02-28 PROCEDURE — 63600175 PHARM REV CODE 636 W HCPCS: Performed by: PSYCHIATRY & NEUROLOGY

## 2022-02-28 RX ORDER — FAMOTIDINE 10 MG/ML
20 INJECTION INTRAVENOUS
Status: CANCELLED | OUTPATIENT
Start: 2022-03-01

## 2022-02-28 RX ORDER — HEPARIN 100 UNIT/ML
500 SYRINGE INTRAVENOUS
Status: CANCELLED | OUTPATIENT
Start: 2022-03-01

## 2022-02-28 RX ORDER — FAMOTIDINE 10 MG/ML
20 INJECTION INTRAVENOUS
Status: COMPLETED | OUTPATIENT
Start: 2022-02-28 | End: 2022-02-28

## 2022-02-28 RX ORDER — HEPARIN 100 UNIT/ML
500 SYRINGE INTRAVENOUS
Status: DISCONTINUED | OUTPATIENT
Start: 2022-02-28 | End: 2022-02-28 | Stop reason: HOSPADM

## 2022-02-28 RX ORDER — SODIUM CHLORIDE 0.9 % (FLUSH) 0.9 %
10 SYRINGE (ML) INJECTION
Status: DISCONTINUED | OUTPATIENT
Start: 2022-02-28 | End: 2022-02-28 | Stop reason: HOSPADM

## 2022-02-28 RX ORDER — SODIUM CHLORIDE 0.9 % (FLUSH) 0.9 %
10 SYRINGE (ML) INJECTION
Status: CANCELLED | OUTPATIENT
Start: 2022-03-01

## 2022-02-28 RX ORDER — ACETAMINOPHEN 325 MG/1
650 TABLET ORAL
Status: CANCELLED | OUTPATIENT
Start: 2022-03-01

## 2022-02-28 RX ORDER — ACETAMINOPHEN 325 MG/1
650 TABLET ORAL
Status: COMPLETED | OUTPATIENT
Start: 2022-02-28 | End: 2022-02-28

## 2022-02-28 RX ADMIN — FAMOTIDINE 20 MG: 10 INJECTION INTRAVENOUS at 09:02

## 2022-02-28 RX ADMIN — SODIUM CHLORIDE: 0.9 INJECTION, SOLUTION INTRAVENOUS at 09:02

## 2022-02-28 RX ADMIN — DIPHENHYDRAMINE HYDROCHLORIDE 50 MG: 50 INJECTION, SOLUTION INTRAMUSCULAR; INTRAVENOUS at 09:02

## 2022-02-28 RX ADMIN — HUMAN IMMUNOGLOBULIN G 30 G: 20 LIQUID INTRAVENOUS at 10:02

## 2022-02-28 RX ADMIN — ACETAMINOPHEN 650 MG: 325 TABLET ORAL at 09:02

## 2022-03-02 ENCOUNTER — INFUSION (OUTPATIENT)
Dept: INFUSION THERAPY | Facility: OTHER | Age: 77
End: 2022-03-02
Attending: STUDENT IN AN ORGANIZED HEALTH CARE EDUCATION/TRAINING PROGRAM
Payer: MEDICARE

## 2022-03-02 VITALS
OXYGEN SATURATION: 98 % | RESPIRATION RATE: 17 BRPM | SYSTOLIC BLOOD PRESSURE: 136 MMHG | DIASTOLIC BLOOD PRESSURE: 69 MMHG | TEMPERATURE: 98 F | HEART RATE: 67 BPM

## 2022-03-02 DIAGNOSIS — G61.81 CHRONIC INFLAMMATORY DEMYELINATING POLYNEUROPATHY: Primary | ICD-10-CM

## 2022-03-02 PROCEDURE — 96375 TX/PRO/DX INJ NEW DRUG ADDON: CPT

## 2022-03-02 PROCEDURE — 63600175 PHARM REV CODE 636 W HCPCS: Mod: JG | Performed by: PSYCHIATRY & NEUROLOGY

## 2022-03-02 PROCEDURE — 25000003 PHARM REV CODE 250: Performed by: PSYCHIATRY & NEUROLOGY

## 2022-03-02 PROCEDURE — 96367 TX/PROPH/DG ADDL SEQ IV INF: CPT

## 2022-03-02 PROCEDURE — 96366 THER/PROPH/DIAG IV INF ADDON: CPT

## 2022-03-02 PROCEDURE — 96365 THER/PROPH/DIAG IV INF INIT: CPT

## 2022-03-02 RX ORDER — SODIUM CHLORIDE 0.9 % (FLUSH) 0.9 %
10 SYRINGE (ML) INJECTION
Status: DISCONTINUED | OUTPATIENT
Start: 2022-03-02 | End: 2022-03-02 | Stop reason: HOSPADM

## 2022-03-02 RX ORDER — HEPARIN 100 UNIT/ML
500 SYRINGE INTRAVENOUS
Status: DISCONTINUED | OUTPATIENT
Start: 2022-03-02 | End: 2022-03-02 | Stop reason: HOSPADM

## 2022-03-02 RX ORDER — HEPARIN 100 UNIT/ML
500 SYRINGE INTRAVENOUS
Status: CANCELLED | OUTPATIENT
Start: 2022-03-28

## 2022-03-02 RX ORDER — FAMOTIDINE 10 MG/ML
20 INJECTION INTRAVENOUS
Status: CANCELLED | OUTPATIENT
Start: 2022-03-28

## 2022-03-02 RX ORDER — SODIUM CHLORIDE 0.9 % (FLUSH) 0.9 %
10 SYRINGE (ML) INJECTION
Status: CANCELLED | OUTPATIENT
Start: 2022-03-28

## 2022-03-02 RX ORDER — ACETAMINOPHEN 325 MG/1
650 TABLET ORAL
Status: COMPLETED | OUTPATIENT
Start: 2022-03-02 | End: 2022-03-02

## 2022-03-02 RX ORDER — FAMOTIDINE 10 MG/ML
20 INJECTION INTRAVENOUS
Status: COMPLETED | OUTPATIENT
Start: 2022-03-02 | End: 2022-03-02

## 2022-03-02 RX ORDER — ACETAMINOPHEN 325 MG/1
650 TABLET ORAL
Status: CANCELLED | OUTPATIENT
Start: 2022-03-28

## 2022-03-02 RX ADMIN — DIPHENHYDRAMINE HYDROCHLORIDE 50 MG: 50 INJECTION, SOLUTION INTRAMUSCULAR; INTRAVENOUS at 12:03

## 2022-03-02 RX ADMIN — FAMOTIDINE 20 MG: 10 INJECTION INTRAVENOUS at 11:03

## 2022-03-02 RX ADMIN — ACETAMINOPHEN 650 MG: 325 TABLET ORAL at 11:03

## 2022-03-02 RX ADMIN — SODIUM CHLORIDE: 0.9 INJECTION, SOLUTION INTRAVENOUS at 12:03

## 2022-03-02 RX ADMIN — HUMAN IMMUNOGLOBULIN G 30 G: 20 LIQUID INTRAVENOUS at 12:03

## 2022-03-02 NOTE — PLAN OF CARE
IVIG infusion complete. Pt tolerated well. VSS. NAD. IV DC'd.  Pt verbalized understanding of discharge instructions before leaving.

## 2022-03-04 ENCOUNTER — PATIENT MESSAGE (OUTPATIENT)
Dept: NEUROLOGY | Facility: CLINIC | Age: 77
End: 2022-03-04
Payer: MEDICARE

## 2022-03-04 ENCOUNTER — TELEPHONE (OUTPATIENT)
Dept: NEUROLOGY | Facility: CLINIC | Age: 77
End: 2022-03-04
Payer: MEDICARE

## 2022-03-04 NOTE — TELEPHONE ENCOUNTER
----- Message from [facility] sent at 3/4/2022 10:47 AM CST -----  Who Called: EDUAR SEAY    Refill or New Rx: New RX    RX Name and Strength: Vitamin D-3    How is the patient currently taking it? (ex. 1XDay): 1xweek    Is this a 30 day or 90 day RX: 30 day    Preferred Pharmacy with phone number: [facility]/pharmacy #[id number] - [facility] [address]    Local or Mail Order: Local    Ordering Provider: Ellen Sahni MD    Best Call Back Number: [phone]    Additional Information:

## 2022-03-07 ENCOUNTER — OFFICE VISIT (OUTPATIENT)
Dept: NEUROLOGY | Facility: CLINIC | Age: 77
End: 2022-03-07
Payer: MEDICARE

## 2022-03-07 VITALS
WEIGHT: 145.5 LBS | HEART RATE: 71 BPM | SYSTOLIC BLOOD PRESSURE: 133 MMHG | DIASTOLIC BLOOD PRESSURE: 71 MMHG | HEIGHT: 69 IN | BODY MASS INDEX: 21.55 KG/M2

## 2022-03-07 DIAGNOSIS — R29.898 WEAKNESS OF BOTH HANDS: Primary | ICD-10-CM

## 2022-03-07 DIAGNOSIS — G61.81 CIDP (CHRONIC INFLAMMATORY DEMYELINATING POLYNEUROPATHY): ICD-10-CM

## 2022-03-07 PROCEDURE — 99999 PR PBB SHADOW E&M-EST. PATIENT-LVL V: CPT | Mod: PBBFAC,,, | Performed by: PSYCHIATRY & NEUROLOGY

## 2022-03-07 PROCEDURE — 99215 OFFICE O/P EST HI 40 MIN: CPT | Mod: PBBFAC | Performed by: PSYCHIATRY & NEUROLOGY

## 2022-03-07 PROCEDURE — 99999 PR PBB SHADOW E&M-EST. PATIENT-LVL V: ICD-10-PCS | Mod: PBBFAC,,, | Performed by: PSYCHIATRY & NEUROLOGY

## 2022-03-07 PROCEDURE — 99215 PR OFFICE/OUTPT VISIT, EST, LEVL V, 40-54 MIN: ICD-10-PCS | Mod: S$PBB,,, | Performed by: PSYCHIATRY & NEUROLOGY

## 2022-03-07 PROCEDURE — 99215 OFFICE O/P EST HI 40 MIN: CPT | Mod: S$PBB,,, | Performed by: PSYCHIATRY & NEUROLOGY

## 2022-03-07 NOTE — PATIENT INSTRUCTIONS
Thank you for coming    Your examination is stable, you would like to space out IVIG infusions since you are stable  -We will do IVIG every 8 weeks  -Check B12  - Hand therapy referral for recommendations on hand braces and exercises  -PT referral for foot weakness     Follow up in August 2022

## 2022-03-07 NOTE — PROGRESS NOTES
NEUROLOGY  Outpatient Follow up visit    [address] [address]  [phone] (office) / [phone] ( (fax)    Patient Name:  Cecilia Munguia  :  1945  MR #:  [id number]  Acct #:  [id number]    Date of Neurology Visit: 2022  Name of Neurologist: Ellen Sahni MD    Other Physicians:  Kapil León MD (Primary Care Physician); No ref. provider found (Referring)      Chief Complaint: Weakness     History of Present Illness (HPI):  Cecilia Munguia is a 76 y.o. female presents to the Neurology Clinic for follow-up. 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     She recently had a runny nose, cough but no fever. She was started on Amoxicillin. She feels like her legs are weaker. She has been feeling down recently, does go the gym 3 times a week, works on resistance training. She developed neck pain after carrying her grandchild.       Current CIDP symptoms:  Hand weakness- she can cut her food, can button her shirt   and toe weakness is unchanged  She completed PT with hip replacement- 2021  Falls-none   Her balance is not good   Walks independently          Relevant history:   Last relapse was when she saw Dr. Johnson. She states she she developed a headache after her IVIG recently. 5/10 in intensity, sharp, throbbing at times mostly above the left ear and also has felt it in the right temporal region.   Left buttock pain has resolved  PT helped with balance and foot weakness   Sometimes she has numbness in her toes, sometimes she has numbness in her fingers.     She got IVIG loading dose 2021  Then 1 gram /kg- 2021  Most recently on 1/3 and   1 gram/ kg divided over 2 days every month     Visit of 2022:  Patient complained of continued weakness in hands. Toe weakness unchanaged and being off balance. She is getting IVIG as outpatient. B12 level was ordered but patient did not get this done.  Examination stable/ improved.           4/8/2021:   Since she last saw me her gall bladder has been removed. Post surgery she developed explosive diarrhea. She  She states she has severe pain in the left groin, left buttock now the pain travels into the left leg/ ankle. This is extremely painful and prevents her from sleeping. She is not sleeping well because of this.    CIDP symptoms:  Hand weakness and toe weakness is unchanged  Not using a cane anymore  She is not doing PT and has not been able toe exercise due to severe left buttock pain.   No falls.   -I recommend initiating therapy for CIDP given progressive weakness, change in weakness when compared 5th my any previous examination as well as examination by Dr. Johnson         Initial HPI   1/7/2021:      She was previously followed by Dr. Drake Johnson, last visit was on 11/8/2016 and this is her 1st visit with me. The patient presents with records from [facility].    She reports that she developed somewhat sudden onset weakness involving her feet and hands at age 20.  She was in present at that time  She remembers feeling weak in her feet when trying to get in a school bus. Then she developed numbness in her feet. She then noted weakness in her fingers. She also has numbness along the inside of the 5th finger and the inside of her right ring finger. Sometimes she has numbness in her feet.  She remembers being in the hospital for a month. She had a LP at that time.     She was managed with oral steroids and PT. She had a LP at that time.     She was managed with Prednisone for several years.     In 1986 she came here. She was seen by Dr. Arellano who diagnosed her with CIDP. She was then followed by Dr. Deutsch. She was switched to IVIG. She gets IVIG for relapses. The longest time she went without a relapse was several years.     Relapse symptoms:   She starts feeling numbness in her hands and feet. She feels sick over all and the symptoms progress  up to her legs. She has symptoms in her hands. Sometimes she has needle like senation in her face.     Current symptoms:  She has been using a cane lately because her balance is off. She does PT. Her balance has been off and since her right hip was replaced   Weakness in fingers and toes is stable. She had a fall in 2020 and says she sustained a fracture involving the left knee. She twisted her leg and fell on the sofa. She was walking backwards another time and could not catch herself and hit her back. She states she sustained another fracture.  She has been told by her primary care physician that she has osteoporosis although the DEXA scan did not reveal the same.     She has pain in the left buttock which goes into the left thigh    Family history:   No family h/o neuropathy.     She states she was misdiagnosed previously before a diagnosis of CIDP was made.      Duration: 55 years   Location:  Bilateral hands and feet  Intensity:  Moderate  Aggravating factors: none  Relieving factors: IVIG  Frequency:daily  Timing: all day  Associated symptoms: Intermittent relapses, last relapse per notes was in 2016        Treatment to date:    Prednisone for several years then switched to IVIG for relapse only    Review of Systems:    General: Weight gain: No, Weight Loss: No, Fatigue: Yes,   Fever: No, Chills: Yes, Night Sweats: No, Insomnia: Yes, Excessive sleeping: No   Respiratory:  Cough: No, Shortness of Breath: No,   Wheezing: No, Excessive Snoring: No, Coughing up blood: No  Endocrine: Heat Intolerance: No, Cold Intolerance: Yes,   Excessive Thirst: No, Excessive Hunger: No,   Eyes:  Blurred Vision: No, Double Vision: No,   Light Sensitivity: Yes, Eye pain: No  Musculoskeletal: Muscle Aches/Pain: Yes, Joint Pain/Swelling: Yes, Muscle Cramps: Yes, Muscle Weakness: Yes, Neck Pain: No, Back Pain: No   Neurological: Difficulty Walking/Falls: Yes, Headache Migraine: No, Dizziness/Vertigo: No, Fainting: No, Difficulty with  Speech: No, Weakness: Yes, Tingling/Numbness: No, Tremors: Yes, Memory Problems:Yes, Seizures: No, Difficulty Swallowing: No, Altered Taste: No.  Cardiovascular: Chest Pain: No, Shortness of Breath: No,   Palpitations: No  Gastrointestinal: Nausea/Vomiting: No, Constipation: Yes, Diarrhea: No, Bloody Stools: No   Psych/Cog:  Depression: Yes, Anxiety: Yes, Hallucinations: No, Problems Concentrating: No  : Frequent Urination: No, Incontinence: Yes, Blood of Urine: No, Urinary Infections: No, Changes in Sex Drive: No   ENT:Hearing Loss: Yes, Earache: No, Ringing in Ears: Yes,   Facial Pain: No, Chronic Congestion: No   Immune: Seasonal Allergies: No, Hives and/or Rashes: No  The remainder of the review of twelve body systems was reviewed and normal.    Past Medical, Surgical, Family & Social History:   Past Medical History:   Diagnosis Date    Anxiety     Balance problem     Chronic inflammatory demyelinating neuropathy     Depression     Diarrhea     Encounter for blood transfusion     GERD (gastroesophageal reflux disease)     [place] (hard of hearing)     Hyperlipidemia     Hypertension     IBS (irritable bowel syndrome)     Neuromuscular disorder     demyelinating neuropathy    Ulcer     several years ago       Home Medications:     Current Outpatient Medications:     amoxicillin-clavulanate 875-125mg (AUGMENTIN) 875-125 mg per tablet, Take 1 tablet by mouth 2 (two) times daily., Disp: 14 tablet, Rfl: 0    cholestyramine (QUESTRAN) 4 gram packet, Take 4 g by mouth once daily., Disp: , Rfl:     cyanocobalamin (VITAMIN B-12) 1000 MCG tablet, Take 100 mcg by mouth once daily., Disp: , Rfl:     ergocalciferol (ERGOCALCIFEROL) 50,000 unit Cap, Take 50,000 Units by mouth every 7 days., Disp: , Rfl:     fesoterodine (TOVIAZ) 8 mg Tb24, Take 8 mg by mouth once daily., Disp: 30 tablet, Rfl: 11    hydroCHLOROthiazide (HYDRODIURIL) 12.5 MG Tab, TAKE 1 TABLET BY MOUTH EVERY DAY, Disp: 90 tablet, Rfl: 3     "losartan (COZAAR) 50 MG tablet, TAKE 1 TABLET BY MOUTH EVERY DAY, Disp: 90 tablet, Rfl: 3    multivitamin (THERAGRAN) per tablet, Take 1 tablet by mouth once daily., Disp: , Rfl:     MYRBETRIQ 25 mg Tb24 ER tablet, , Disp: , Rfl:     omeprazole (PRILOSEC) 20 MG capsule, Take 20 mg by mouth Daily. , Disp: , Rfl:     sertraline (ZOLOFT) 50 MG tablet, TAKE 1 TABLET BY MOUTH EVERY DAY, Disp: 90 tablet, Rfl: 3    simethicone (MYLICON) 80 MG chewable tablet, Take 80 mg by mouth every 6 (six) hours as needed for Flatulence., Disp: , Rfl:     Physical Examination:  /71   Pulse 71   Ht 5' 9" (1.753 m)   Wt 66 kg (145 lb 8 oz)   BMI 21.49 kg/m²     GENERAL:  General appearance: Well, non-toxic appearing.  No apparent distress.  Fundi exam: normal.  Neck: supple.  Carotid auscultation: normal.  Heart auscultation: normal.  Peripheral pulses: normal.  Extremities: normal.    MENTAL STATUS:  Alertness, attention span & concentration: normal.  Language: normal.  Orientation to self, place & time:  normal.  Memory, recent & remote: normal.  Fund of knowledge: normal.      SPEECH:  Clear and fluent.  Follows complex commands.    CRANIAL NERVES:  Cranial Nerves II-XII were examined.  II - Visual fields: normal.  III, IV, VI: PERRL, EOMI, No ptosis, No nystagmus.  V - Facial sensation: normal.  VII - Face symmetry & mobility: normal.  VIII - Hearing: normal.  IX, X - Palate: mobile & midline.  XI - Shoulder shrug: normal.  XII - Tongue protrusion: normal.    GROSS MOTOR:  Gait & station: Bilateral foot drop- steppage gait   Tone: normal.  Abnormal movements: none.  Finger-nose & Heel-knee-shin: normal.  Rapid alternating movements & drift: normal.  Romberg: absent    MUSCLE STRENGTH:  Flexion deformities in both hands noted    Fascics Atrophy RIGHT    LEFT Atrophy Fascics     5 Neck Ext. 5       5 Neck Flex 5       5 Deltoids 5       5 Sh.Ext.Rot. 5       5 Sh.Int.Rot. 5       5 Biceps 5       5 Triceps 5       5 " Forearm.Pr. 5       5 Wrist Ext. 5       5 Wrist Flex 5       4 Finger Ext. 4       5 Finger Flex 5       5 FPL 5      ++ 3+ Inteross. 3+ ++      1 APB 4       2 FDI 2       5 Iliopsoas 5       5 Hip Abduct 5       5 Hip Adduct 5       5 Quads 5       5 Hams 5       4 Dorsiflex 3        + 5 Plantar Flex 4       4 Ankle Niko 4       3 Ankle Invert 5       2 Toe Ext. 0       4- Toe Flex 3+                         REFLEXES:    RIGHT Reflex   LEFT   0 Biceps 2+   2+ Brachiorad. 2+   2+ Triceps 2+        1+ Patellar 2+   0 Ankle 0        Down PLANTAR Down     SENSORY:  Light touch: Normal throughout.  Sharp touch: gradient to upper leg  Vibration: lost to medial malleolus bilaterally at MM- 8 sceonds   Temperature: gradient to lower leg  Joint Position: Normal throughout.      EPL -4/5  FPL -4/5   ADM 3/5    Unable to walk on toes or heels or tandem        Diagnostic Data Reviewed:    EMG/NCS at [facility] by Dr. Arellano:  December 6, 1995          Findings:  Right ulnar motor amplitudes are reduced with severely slowed conduction velocities (17 m/sec), similar to the previous study of 07/06/1994.    Impression:  Right ulnar motor conduction velocity has not changed since 7/94, suggesting no electrophysiologically detectable progression of her disease.  Her report is consistent with of minor clinical exacerbation of CIDP.      3/7/1996:  Folic acid -5  Vitamin     MRI knee without contrast on 08/03/2020:    Impression:     1. Nondisplaced intercondylar tibial eminence fracture at the distal ACL insertion.  Intact ACL without partial or full-thickness tear.  2. Nondisplaced/nondepressed comminuted intra-articular fracture of the posteromedial aspect of the lateral tibial plateau with component extending anteriorly into the aforementioned tibial eminence fracture.  3. Advanced patellar chondromalacia.  4. Large joint effusion with prominent synovitis.  5. Fusiform thickening of the tibial and common fibular  nerves with associated fatty atrophy of the musculature at the superficial posterior compartment of the lower leg, findings that are consistent with sequela of the patient's known chronic inflammatory demyelinating polyneuropathy.            Assessment and Plan:    Cecilia Munguia is a 75 y.o. female presents to the Neurology Clinic for follow-up.      She reports that she developed somewhat sudden onset weakness involving her feet and hands at age 20.  She was in [place] at that time.  She thinks the weakness gradually progressed over weeks. She remembers being in the hospital for a month. She had a LP at that time.  She was managed with oral steroids for several years.  She then moved [place] the [place] and was misdiagnosed initially but later diagnosed with CIDP by Dr. Arellano based on EMG testing.    EMG/NCS- 1 study scanned into the system from 1995 reveals significantly reduced ulnar nerve motor conduction velocity.  Unable to review waveforms.     Her examination reveals significant distal foot and hand weakness with atrophy involving her intrinsic hand muscles and right calf.  She has bilaterally absent ankle reflexes, flexion deformities at her hands.  She is hesitant to repeat the EMG/NCS as she states it was repeated several times when she was followed by .  The other differential diagnosis would be hereditary sensory motor neuropathy. She was managed with IVIG only during relapses as her examination was stable outside of relapses.  During her last visit I noted worsening weakness when compared with my previous testing, I recommended IVIG. She started the same but is hesitant to continue.    Today her examination is stable/ improved, she feels like her legs are weaker subjectively. She does not want to do monthtly IVIG infusions and agrees with IVIG infusions every 2 months. I would like to examine her between infusions and if she remains stable we can continue this regimen.         MRI knee is reveals  fusiform enlargement of peroneal and tibial nerves which is consistent with CIDP.  Comprehensive neuropathy panel via [employer] is negative         Assessment:  1.  CIDP  2.  Low B12 in the past  3.  Idiopathic progressive polyneuropathy, blood work to evaluate for underlying cause within normal limits, B12 was not checked  4.  Vitamin-D deficiency        Recommendations:  1. IVIG- 1 gram/ kg divided over 2 days every  2 months with close clinical evaluation based on patient preference . Today exam is stable  2.  Physical therapy referral   3.  OT referral for recommendations on hand braces given stiffness/ contractures of finger jointys   4.  Patient asked to contact the clinic if she has relapse symptoms  5.  Check vitamin B12 level ( ordered at last visit)      The patient will return to clinic in 5 months         Time Spent: 49 minutes spent face-to-face, >50% spent advising about: counseling and/or coordination of care    This note was created with voice recognition software.  Grammatical, syntax and spelling errors may be inevitable.           Ellen Sahni MD  Medicine- Neurology, Clinical Neurophysiology

## 2022-03-18 ENCOUNTER — CLINICAL SUPPORT (OUTPATIENT)
Dept: REHABILITATION | Facility: HOSPITAL | Age: 77
End: 2022-03-18
Payer: MEDICARE

## 2022-03-18 DIAGNOSIS — R29.898 WEAKNESS OF BOTH HANDS: ICD-10-CM

## 2022-03-18 DIAGNOSIS — M25.60 RANGE OF MOTION DEFICIT: ICD-10-CM

## 2022-03-18 PROCEDURE — 97165 OT EVAL LOW COMPLEX 30 MIN: CPT

## 2022-03-18 NOTE — PLAN OF CARE
[facility]  Occupational Therapy Initial Evaluation    Date: 3/18/2022  Name: Cecilia Munguia  Clinic Number: [id number]    Therapy Diagnosis:   Encounter Diagnoses   Name Primary?    Weakness of both hands     Range of motion deficit      Physician: Ellen Sahni MD    Physician Orders: eval and treat  Medical Diagnosis: Weakness of both hands [R29.898]    Surgical Procedure and Date: n/a / Date of Injury/Onset: 18 years old, but having increasing difficulty the past several months with writing and painting.  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 She reports also has issues in B hands and legs. She c/o weakness and difficulty straightening fingers. She reports increasing difficulty with writing and holding a paint brush.    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     Current Functional Status   Home/Living environment: lives with their spouse      Limitation of Functional Status as follows:   ADLs/IADLs:     - Feeding: I    - Bathing: I    - Dressing/Grooming: I, sometimes with increased time    - Driving: I    -pt cooks and cleans, difficulty with chopping vegetables and peeling  potatoes or oranges    -difficulty writing     Leisure: enjoys painting, drawing but having increasing difficulty with this.     Pain:  Functional Pain Scale Rating 0-10: Current 0/10, worst 0/10, best 0/10   Location: n/a  Description: none  Aggravating Factors: none  Easing Factors: none    Patient's Goals for Therapy: to get better, and try to get fingers straighter    Medical History:   Past Medical History:   Diagnosis Date    Anxiety     Balance problem     Chronic inflammatory demyelinating neuropathy     Depression     Diarrhea     Encounter for blood transfusion     GERD (gastroesophageal reflux disease)     [place] (hard of hearing)     Hyperlipidemia     Hypertension     IBS (irritable bowel syndrome)     Neuromuscular disorder     demyelinating neuropathy    Ulcer     several years ago       Surgical History:    has a past surgical history that includes Cosmetic surgery;  section; Tonsillectomy; Ectopic pregnancy surgery; Breast surgery; Hip Arthroplasty (Right, 3/11/2019); Laparoscopic cholecystectomy (N/A, 2020); Joint replacement (Right, 2019); Hip Arthroplasty (Left, 5/10/2021); and Laparoscopic appendectomy (N/A, 10/22/2021).    Medications:   has a current medication list which includes the following prescription(s): amoxicillin-clavulanate 875-125mg, cholestyramine, cyanocobalamin, ergocalciferol, fesoterodine, hydrochlorothiazide, losartan, multivitamin, myrbetriq, omeprazole, sertraline, and simethicone.    Allergies:   Review of patient's allergies indicates:   Allergen Reactions    Sulfa (sulfonamide antibiotics) Other (See Comments)     shaking  Other reaction(s): Other (See Comments)  Pt states shaking and feeling bad          OBJECTIVE     Observation/Appearance: R hand: atrophy noted in palm, thenar, and hypotherar. aRthitic deformity in R LF DIP, with radial deviation. Hyperextension of MPs and flexion of PIPs with active muscle activation.  Unable to oppose thumb,  lack of abd. Ulnar deviation of fingers at MPs   L hand: atrophy noted as well in palm, thenar, hypothenar. hyperext of MPs not as severe as right. Heberdens nodes noted on IF DIP.     Edema. None noted        Elbow and Wrist ROM. Measured in degrees.   3/22/2022 3/22/2022    Left Right   Elbow Ext/Flex     Supination/Pronation WFL WFL   Wrist Ext/Flex WFL WFL   Wrist RD/UD 5/28 10/40     Hand ROM. Measured in degrees.   3/22/2022 3/22/2022    Left Right    Ext/flex Ext/flex   Index: MP  WFL +30/WFL              PIP     -35/WFL -20/WFL              DIP -25/WFL -25/WFL              XIONG          Long:  MP WFL +20/WFL              PIP -65/WFL -40/WFL              DIP -20/WFL -10/WFL              XIONG          Ring:   MP WFL +40/WFL              PIP -55/WFL -52/WFL              DIP -15/WFL -10/WFL              XIONG          Small:  MP +10/WFL +55/WFL               PIP -80/WFL -80/WFL               DIP 50/WFL -25/WFL              XIONG          Thumb: MP 40 75                IP 53 85       Rad ADD/ABD 55 65       Pal ADD/ABD 55 unable          Opposition WFL, ~1 cm from DPC unable      Strength (Dynamometer) and Pinch Strength (Pinch Gauge)  Measured in pounds.   3/22/2022 3/22/2022    Left Right   Rung II 24 35   Key Pinch NT NT   3pt Pinch  unable   2pt Pinch  unable     Sensation: denied  tingling a this time. Reports does have impaired sensation of L SF/RF   3/18/2022 3/18/2022    Left Right   Charlotte Ernie     Normal 1.65-2.83     Diminished Light Touch 3.22-3.61     Diminished Protective 3.84-4.31     Loss of Protective 4.56-6.65     Untestable >6.65     2 Point Discrimination     Static     Dynamic                 Limitation/Restriction for FOTO Hand Survey    Therapist reviewed FOTO scores for Cecilia Munguia on 3/18/2022.   FOTO documents entered into [employer] - see Media section.    Limitation Score: 44%             Patient Education and Home Exercises      Education provided:   - educated on splinting options,  including and anti-claw daytime orthosis and Bilateral resting hand splints from Dynasplint for night time wear  -educated on HEP, including light yellow putty molding/gripping with B hands as tolerated    Written Home Exercises Provided: yes.  Exercises were reviewed and Cecilia was able to demonstrate them prior to the end of the session.  Cecilia demonstrated fair  understanding of the education provided. See EMR under Patient Instructions for exercises provided during therapy sessions.     Pt was advised to perform these exercises free of pain, and to stop performing them if pain occurs.    Patient/Family Education: role of OT, goals for OT, scheduling/cancellations - pt verbalized understanding. Discussed insurance limitations with patient.    ASSESSMENT     Cecilia Munguia is a 76 y.o. female referred to outpatient occupational therapy and presents with a medical diagnosis of B hand weakness due to CIDP.  Patient presents with the following therapy deficits: Decreased ROM, Decreased  strength, Decreased pinch strength, Decreased muscle strength, Decreased functional hand use, Joint Stiffness and Diminished/Impaired Coordination and demonstrates limitations as described in the chart below. Following medical record review it is determined that pt will benefit from occupational therapy services in order to maximize pain free and/or functional use of bilateral hands. The following goals were discussed with the patient and patient is in agreement with them as to be addressed in the treatment plan. The patient's rehab potential is Fair.     Anticipated barriers to occupational therapy: chronic nature of dx  Pt has no cultural, educational or language barriers to learning provided.    Profile and History Assessment of Occupational Performance Level of Clinical Decision Making Complexity Score   Occupational Profile:   Cecilia Munguia is a 76 y.o. female who lives with their spouse and is  Cecilia Munguia  has difficulty with  ADLs and IADLs as listed previously, which  Affecting herdaily functional abilities.      Comorbidities:    has a past medical history of Anxiety, Balance problem, Chronic inflammatory demyelinating neuropathy, Depression, Diarrhea, Encounter for blood transfusion, GERD (gastroesophageal reflux disease), [place] (hard of hearing), Hyperlipidemia, Hypertension, IBS (irritable bowel syndrome), Neuromuscular disorder, and Ulcer.    Medical and Therapy History Review:   Expanded               Performance Deficits    Physical:  Joint Mobility  Joint Stability  Muscle Power/Strength  Muscle Endurance   Strength  Pinch Strength  Fine Motor Coordination  Proprioception Functions  Muscle Tone    Cognitive:  No Deficits    Psychosocial:    Habits  Routines     Clinical Decision Making:  moderate    Assessment Process:  Comprehensive Assessments    Modification/Need for Assistance:  Minimal-Moderate Modifications/Assistance    Intervention Selection:  Limited Treatment Options       low  Based on PMHX, co morbidities , data from assessments and functional level of assistance required with task and clinical presentation directly impacting function.         Goals:   The following goals were discussed with the patient and patient is in agreement with them as to be addressed in the treatment plan.   Long Term Goals (LTGs); to be met by discharge.  LTG #1: Pt will report independence with orthosis wear.   LTG #2: Pt will demo improved FOTO score by 20 points.   LTG #3: Pt will return to prior level of function for ADLs and household management.  LTG #4: Pt will demonstrate improved B  strength by at least 3-5# for improved grasp of objects.      Short Term Goals (STGs); to be met within 4 weeks ().  STG #1:  Pt will demonstrate independence with issued HEP and orthosis wear.       PLAN   Plan of Care Certification: 3/18/2022 to 5/13/22.     Outpatient Occupational Therapy 6-8 visits during 8 weeks to  include the following interventions: Paraffin, Fluidotherapy, Manual therapy/joint mobilizations, Modalities for pain management, US 3 mhz, Therapeutic exercises/activities., Strengthening, Orthotic Fabrication/Fit/Training, Joint Protection and Energy Conservation.  REcommend Dynasplint resting hand splints for night time wear to prevent contractures and improve joint positioning. Also recommend anti-claw orthosis and possibly thumb spica or opponens orthosis for daytime use with activities.      PRAVEEN Chritsina, T      I CERTIFY THE NEED FOR THESE SERVICES FURNISHED UNDER THIS PLAN OF TREATMENT AND WHILE UNDER MY CARE  Physician's comments:      Physician's Signature: ___________________________________________________

## 2022-03-18 NOTE — PATIENT INSTRUCTIONS
[facility]  OCCUPATIONAL THERAPY  HOME EXERCISE PROGRAM     Complete the following strengthening program 1x/day.

## 2022-03-21 ENCOUNTER — CLINICAL SUPPORT (OUTPATIENT)
Dept: REHABILITATION | Facility: HOSPITAL | Age: 77
End: 2022-03-21
Payer: MEDICARE

## 2022-03-21 DIAGNOSIS — M25.60 RANGE OF MOTION DEFICIT: Primary | ICD-10-CM

## 2022-03-21 DIAGNOSIS — R29.898 WEAKNESS OF BOTH HANDS: ICD-10-CM

## 2022-03-21 PROCEDURE — L3913 HFO W/O JOINTS CF: HCPCS

## 2022-03-22 ENCOUNTER — PATIENT MESSAGE (OUTPATIENT)
Dept: NEUROLOGY | Facility: CLINIC | Age: 77
End: 2022-03-22
Payer: MEDICARE

## 2022-03-22 ENCOUNTER — TELEPHONE (OUTPATIENT)
Dept: NEUROLOGY | Facility: CLINIC | Age: 77
End: 2022-03-22
Payer: MEDICARE

## 2022-03-22 ENCOUNTER — PATIENT MESSAGE (OUTPATIENT)
Dept: REHABILITATION | Facility: HOSPITAL | Age: 77
End: 2022-03-22
Payer: MEDICARE

## 2022-03-22 PROBLEM — R29.898 WEAKNESS OF BOTH HANDS: Status: ACTIVE | Noted: 2022-03-22

## 2022-03-22 PROBLEM — M25.60 RANGE OF MOTION DEFICIT: Status: ACTIVE | Noted: 2022-03-22

## 2022-03-22 NOTE — TELEPHONE ENCOUNTER
Spoke with patient regarding lab orders patient's requesting vitamin D lab order placed informed her doctor will be notified

## 2022-03-22 NOTE — TELEPHONE ENCOUNTER
----- Message from Aristeo Rodriguez sent at 3/22/2022 11:15 AM CDT -----  Name of Who is Calling: EDUAR SEAY          What is the request in detail: The patient is calling to speak to the nurse in regards to a few questions. Please advise          Can the clinic reply by [facility]: No         What Number to Call Back if not in [facility]: [phone]

## 2022-03-23 NOTE — PROGRESS NOTES
[facility] Occupational Therapy  Orthosis Note - Certificate of Medical Necessity      Date: 3/21/2022  Name: Cecilia Munguia  Clinic Number: [id number]    Medical Diagnosis: R29.898 (ICD-10-CM) - Weakness of both hands  Therapy Diagnosis:   Encounter Diagnoses   Name Primary?    Range of motion deficit Yes    Weakness of both hands      Precautions: Standard and Fall    Physician: Ellen Sahni MD  Physician Orders: eval and treat      Insurance Authorization period Expiration: 22    Time In:9:00  Time Out: 10:00  [facility] Level II Code and Description:  HFO anti-claw orthosis    Subjective     Involved Side: Right  Date of Onset: began when she was a teenager, but has been having more difficulties more recently  Date of Surgery: n/a  Surgical Procedure: n/a  History of Current Condition: Pt presents to therapy for hand weakness and for orthosis fabrications and recommendations.   Previous Therapy: prior visit for OT evaluation    Past Medical History/Physical Systems Review:   Past Medical History:   Diagnosis Date    Anxiety     Balance problem     Chronic inflammatory demyelinating neuropathy     Depression     Diarrhea     Encounter for blood transfusion     GERD (gastroesophageal reflux disease)     [place] (hard of hearing)     Hyperlipidemia     Hypertension     IBS (irritable bowel syndrome)     Neuromuscular disorder     demyelinating neuropathy    Ulcer     several years ago     Cecilia Munguia  has a past surgical history that includes Cosmetic surgery;  section; Tonsillectomy; Ectopic pregnancy surgery; Breast surgery; Hip Arthroplasty (Right, 3/11/2019); Laparoscopic cholecystectomy (N/A, 2020); Joint replacement (Right, ); Hip Arthroplasty (Left, 5/10/2021); and Laparoscopic appendectomy (N/A, 10/22/2021).    Cecilia has a current medication list which includes the following prescription(s): amoxicillin-clavulanate 875-125mg, cholestyramine,  cyanocobalamin, ergocalciferol, fesoterodine, hydrochlorothiazide, losartan, multivitamin, myrbetriq, omeprazole, sertraline, and simethicone.    Review of patient's allergies indicates:   Allergen Reactions    Sulfa (sulfonamide antibiotics) Other (See Comments)     shaking  Other reaction(s): Other (See Comments)  Pt states shaking and feeling bad        Objective      None Pitting Mild Moderate Severe   Edema x          No Deficits Mild Deficits Mod Deficits Severe Deficits   ROM   x        Custom Fabricated Orthosis  The custom orthosis was fabricated as requested using low-temperature plastic material. A pattern was measured then cut and custom molded to accommodate this individual patient.     (x) Hand-Based anti-claw Orthosis  (x) Static      Purpose of Orthosis    (x) Maintain functional hand and Joint Positioning during hand use, and to prevent further contracture and hyperextension of MP joints.       HEP / Patient Instructions        Patient was instructed verbally on information regarding wear schedule, care, and precautions of orthosis.    Assessment      The custom orthosis appeared to fit well with no problems noted. There were no complaints of discomfort from the patient at this time. The patient demonstrated competency with independently doffing and donning orthosis.    Plan     Frequency and Duration: cont OT for 6-8 visits during 8 weeks. Pt may benefit from possible thumb spica or opponens orthosis for right hand.   Orthosis to be worn throughout the day as needed.  Orthosis checks PRN.    I certify that the orthosis was performed by or under the direct supervision of a qualified Occupational Therapist.   Therapist: PRAVEEN Christina, ABDOUL

## 2022-03-31 ENCOUNTER — DOCUMENTATION ONLY (OUTPATIENT)
Dept: REHABILITATION | Facility: HOSPITAL | Age: 77
End: 2022-03-31
Payer: MEDICARE

## 2022-03-31 NOTE — PROGRESS NOTES
Occupational Therapy: No show of Visit  Date: 03/31/2022    Patient was a no show to today's OT appointment. Cecilia Munguia did not call to cancel nor reschedule. This is the first appointment that the patient has not attended. No charges have been posted today.    Cancel: 0  No show: 1    Therapist: Julia Layton, OTR/HANSEL

## 2022-04-01 ENCOUNTER — LAB VISIT (OUTPATIENT)
Dept: LAB | Facility: OTHER | Age: 77
End: 2022-04-01
Attending: INTERNAL MEDICINE
Payer: MEDICARE

## 2022-04-01 DIAGNOSIS — G73.7 MYOPATHY IN DISEASES CLASSIFIED ELSEWHERE: ICD-10-CM

## 2022-04-01 DIAGNOSIS — G62.9 POLYNEUROPATHY: ICD-10-CM

## 2022-04-01 DIAGNOSIS — E53.8 LOW SERUM VITAMIN B12: ICD-10-CM

## 2022-04-01 LAB
25(OH)D3+25(OH)D2 SERPL-MCNC: 36 NG/ML (ref 30–96)
VIT B12 SERPL-MCNC: 429 PG/ML (ref 210–950)

## 2022-04-01 PROCEDURE — 82607 VITAMIN B-12: CPT | Performed by: PSYCHIATRY & NEUROLOGY

## 2022-04-01 PROCEDURE — 36415 COLL VENOUS BLD VENIPUNCTURE: CPT | Performed by: PSYCHIATRY & NEUROLOGY

## 2022-04-01 PROCEDURE — 82306 VITAMIN D 25 HYDROXY: CPT | Performed by: INTERNAL MEDICINE

## 2022-04-07 ENCOUNTER — PATIENT MESSAGE (OUTPATIENT)
Dept: NEUROLOGY | Facility: CLINIC | Age: 77
End: 2022-04-07
Payer: MEDICARE

## 2022-04-13 ENCOUNTER — CLINICAL SUPPORT (OUTPATIENT)
Dept: REHABILITATION | Facility: HOSPITAL | Age: 77
End: 2022-04-13
Payer: MEDICARE

## 2022-04-13 DIAGNOSIS — R29.898 WEAKNESS OF BOTH HANDS: ICD-10-CM

## 2022-04-13 DIAGNOSIS — M25.60 RANGE OF MOTION DEFICIT: Primary | ICD-10-CM

## 2022-04-13 PROCEDURE — 97110 THERAPEUTIC EXERCISES: CPT

## 2022-04-13 PROCEDURE — 97018 PARAFFIN BATH THERAPY: CPT

## 2022-04-13 NOTE — PROGRESS NOTES
Occupational Therapy Treatment Note     Date: 4/13/2022  Name: Cecilia Munguia  Clinic Number: [id number]    Therapy Diagnosis:   Encounter Diagnoses   Name Primary?    Range of motion deficit Yes    Weakness of both hands      Physician: Ellen Sahni MD    Surgical Procedure and Date: n/a / Date of Injury/Onset: 18 years old, but having increasing difficulty the past several months with writing and painting.  Evaluation Date: 3/18/22  Insurance Authorization Period Expiration: 3/7/23  Plan of Care Expiration: 5/13/22  Progress Note Due: 4/18/22   Date of Return to MD: 8/11/22  Visit # / Visits authorized: 1 / 1  FOTO: initial eval     Time In: 1:00 pm  Time Out: 2:00 pm  Total Billable Time: 60 minutes    Precautions:  Standard, CIDP      Subjective     Pt reports: her orthosis is not working  she was compliant with home exercise program given last session.   Response to previous treatment: none noted yet    Pain: 0/10  Location: bilateral hands      Objective     Cecilia received the following supervised modalities after being cleared for contradictions for 10 minutes:   -Paraffin to decrease pain and stiffness and increase tissue elasticity    Cecilia received therapeutic exercises for 50 minutes including:  -TGEs x 10 reps  -wrist extension with 1# x 25 reps  -red therabar smiles, frowns, and twists 2 x 10 reps  -coin  with manipulation x 1 container  -pt education re: activity modification and orthosis wear  -HEP review    Elbow and Wrist ROM. Measured in degrees.    3/22/2022 3/22/2022     Left Right   Elbow Ext/Flex       Supination/Pronation WFL WFL   Wrist Ext/Flex WFL WFL   Wrist RD/UD 5/28 10/40      Hand ROM. Measured in degrees.    3/22/2022 3/22/2022     Left Right     Ext/flex Ext/flex   Index: MP  WFL +30/WFL              PIP     -35/WFL -20/WFL              DIP -25/WFL -25/WFL              XIONG               Long:  MP WFL +20/WFL              PIP -65/WFL -40/WFL              DIP -20/WFL -10/WFL               XIONG               Ring:   MP WFL +40/WFL              PIP -55/WFL -52/WFL              DIP -15/WFL -10/WFL              XIONG               Small:  MP +10/WFL +55/WFL               PIP -80/WFL -80/WFL               DIP 50/WFL -25/WFL              XIONG               Thumb: MP 40 75                IP 53 85       Rad ADD/ABD 55 65       Pal ADD/ABD 55 unable          Opposition WFL, ~1 cm from DPC unable       Strength (Dynamometer) and Pinch Strength (Pinch Gauge)  Measured in pounds.    3/22/2022 3/22/2022     Left Right   Rung II 24 35   Key Pinch NT NT   3pt Pinch   unable   2pt Pinch   unable     Home Exercises and Education Provided     Education provided:   - Continue HEP  - Progress towards goals     Written Home Exercises Provided: Patient instructed to cont prior HEP.  Exercises were reviewed and Cecilia was able to demonstrate them prior to the end of the session.  Cecilia demonstrated good  understanding of the HEP provided.   .   See EMR under Patient Instructions for exercises provided prior visit.        Assessment     Pt performed well in session. Educated pt re: orthosis wear and activity modification. Plan to continue therapy to further establish orthosis wear and HEP.      Cecilia is progressing well towards her goals and there are no updates to goals at this time. Pt prognosis is Fair.     Pt will continue to benefit from skilled outpatient occupational therapy to address the deficits listed in the problem list on initial evaluation provide pt/family education and to maximize pt's level of independence in the home and community environment.     Anticipated barriers to occupational therapy: CIDP    Pt's spiritual, cultural and educational needs considered and pt agreeable to plan of care and goals.    Goals:  The following goals were discussed with the patient and patient is in agreement with them as to be addressed in the treatment plan.   Long Term Goals (LTGs); to be met by discharge.  LTG  #1: Pt will report independence with orthosis wear. Progressing 4/13/2022  LTG #2: Pt will demo improved FOTO score by 20 points. Progressing 4/13/2022  LTG #3: Pt will return to prior level of function for ADLs and household management.Progressing 4/13/2022  LTG #4: Pt will demonstrate improved B  strength by at least 3-5# for improved grasp of objects.  Progressing 4/13/2022     Short Term Goals (STGs); to be met within 4 weeks ().  STG #1:  Pt will demonstrate independence with issued HEP and orthosis wear. Progressing 4/13/2022    Plan   Continue with POC  Updates/Grading for next session: progress as tolerated      Margaret Boasberg, OT

## 2022-04-21 ENCOUNTER — CLINICAL SUPPORT (OUTPATIENT)
Dept: REHABILITATION | Facility: HOSPITAL | Age: 77
End: 2022-04-21
Payer: MEDICARE

## 2022-04-21 DIAGNOSIS — M25.60 RANGE OF MOTION DEFICIT: Primary | ICD-10-CM

## 2022-04-21 DIAGNOSIS — R29.898 WEAKNESS OF BOTH HANDS: ICD-10-CM

## 2022-04-21 PROCEDURE — 97530 THERAPEUTIC ACTIVITIES: CPT

## 2022-04-21 PROCEDURE — 97760 ORTHOTIC MGMT&TRAING 1ST ENC: CPT

## 2022-04-27 ENCOUNTER — INFUSION (OUTPATIENT)
Dept: INFUSION THERAPY | Facility: OTHER | Age: 77
End: 2022-04-27
Attending: STUDENT IN AN ORGANIZED HEALTH CARE EDUCATION/TRAINING PROGRAM
Payer: MEDICARE

## 2022-04-27 VITALS
DIASTOLIC BLOOD PRESSURE: 59 MMHG | SYSTOLIC BLOOD PRESSURE: 114 MMHG | OXYGEN SATURATION: 99 % | RESPIRATION RATE: 16 BRPM | HEIGHT: 69 IN | WEIGHT: 144.81 LBS | BODY MASS INDEX: 21.45 KG/M2 | TEMPERATURE: 98 F | HEART RATE: 62 BPM

## 2022-04-27 DIAGNOSIS — G61.81 CHRONIC INFLAMMATORY DEMYELINATING POLYNEUROPATHY: Primary | ICD-10-CM

## 2022-04-27 PROCEDURE — 25000003 PHARM REV CODE 250: Performed by: PSYCHIATRY & NEUROLOGY

## 2022-04-27 PROCEDURE — 63600175 PHARM REV CODE 636 W HCPCS: Mod: JG | Performed by: PSYCHIATRY & NEUROLOGY

## 2022-04-27 PROCEDURE — 96365 THER/PROPH/DIAG IV INF INIT: CPT

## 2022-04-27 PROCEDURE — 96367 TX/PROPH/DG ADDL SEQ IV INF: CPT

## 2022-04-27 PROCEDURE — 96375 TX/PRO/DX INJ NEW DRUG ADDON: CPT

## 2022-04-27 RX ORDER — ACETAMINOPHEN 325 MG/1
650 TABLET ORAL
Status: COMPLETED | OUTPATIENT
Start: 2022-04-27 | End: 2022-04-27

## 2022-04-27 RX ORDER — FAMOTIDINE 10 MG/ML
20 INJECTION INTRAVENOUS
Status: CANCELLED | OUTPATIENT
Start: 2022-05-01

## 2022-04-27 RX ORDER — ACETAMINOPHEN 325 MG/1
650 TABLET ORAL
Status: CANCELLED | OUTPATIENT
Start: 2022-05-01

## 2022-04-27 RX ORDER — HEPARIN 100 UNIT/ML
500 SYRINGE INTRAVENOUS
Status: DISCONTINUED | OUTPATIENT
Start: 2022-04-27 | End: 2022-04-27 | Stop reason: HOSPADM

## 2022-04-27 RX ORDER — SODIUM CHLORIDE 0.9 % (FLUSH) 0.9 %
10 SYRINGE (ML) INJECTION
Status: DISCONTINUED | OUTPATIENT
Start: 2022-04-27 | End: 2022-04-27 | Stop reason: HOSPADM

## 2022-04-27 RX ORDER — FAMOTIDINE 10 MG/ML
20 INJECTION INTRAVENOUS
Status: COMPLETED | OUTPATIENT
Start: 2022-04-27 | End: 2022-04-27

## 2022-04-27 RX ORDER — HEPARIN 100 UNIT/ML
500 SYRINGE INTRAVENOUS
Status: CANCELLED | OUTPATIENT
Start: 2022-05-01

## 2022-04-27 RX ORDER — SODIUM CHLORIDE 0.9 % (FLUSH) 0.9 %
10 SYRINGE (ML) INJECTION
Status: CANCELLED | OUTPATIENT
Start: 2022-05-01

## 2022-04-27 RX ADMIN — DIPHENHYDRAMINE HYDROCHLORIDE 50 MG: 50 INJECTION, SOLUTION INTRAMUSCULAR; INTRAVENOUS at 11:04

## 2022-04-27 RX ADMIN — ACETAMINOPHEN 650 MG: 325 TABLET ORAL at 11:04

## 2022-04-27 RX ADMIN — FAMOTIDINE 20 MG: 10 INJECTION INTRAVENOUS at 11:04

## 2022-04-27 RX ADMIN — SODIUM CHLORIDE: 0.9 INJECTION, SOLUTION INTRAVENOUS at 11:04

## 2022-04-27 RX ADMIN — HUMAN IMMUNOGLOBULIN G 30 G: 20 LIQUID INTRAVENOUS at 12:04

## 2022-04-27 NOTE — PLAN OF CARE
Privigen infusion administered, no reaction. Patient tolerated well. 24 gauge IV removed, catheter intact. No apparent distress noted. Discharge instructions given to patient. Patient understands instructions. Follow up appointment scheduled.

## 2022-04-28 ENCOUNTER — INFUSION (OUTPATIENT)
Dept: INFUSION THERAPY | Facility: OTHER | Age: 77
End: 2022-04-28
Attending: STUDENT IN AN ORGANIZED HEALTH CARE EDUCATION/TRAINING PROGRAM
Payer: MEDICARE

## 2022-04-28 VITALS
DIASTOLIC BLOOD PRESSURE: 78 MMHG | HEART RATE: 58 BPM | RESPIRATION RATE: 17 BRPM | OXYGEN SATURATION: 98 % | SYSTOLIC BLOOD PRESSURE: 140 MMHG | TEMPERATURE: 98 F

## 2022-04-28 DIAGNOSIS — G61.81 CHRONIC INFLAMMATORY DEMYELINATING POLYNEUROPATHY: Primary | ICD-10-CM

## 2022-04-28 PROCEDURE — 96365 THER/PROPH/DIAG IV INF INIT: CPT

## 2022-04-28 PROCEDURE — 25000003 PHARM REV CODE 250: Performed by: PSYCHIATRY & NEUROLOGY

## 2022-04-28 PROCEDURE — 63600175 PHARM REV CODE 636 W HCPCS: Performed by: PSYCHIATRY & NEUROLOGY

## 2022-04-28 PROCEDURE — 96366 THER/PROPH/DIAG IV INF ADDON: CPT

## 2022-04-28 PROCEDURE — 96367 TX/PROPH/DG ADDL SEQ IV INF: CPT

## 2022-04-28 PROCEDURE — 96375 TX/PRO/DX INJ NEW DRUG ADDON: CPT

## 2022-04-28 RX ORDER — HEPARIN 100 UNIT/ML
500 SYRINGE INTRAVENOUS
Status: DISCONTINUED | OUTPATIENT
Start: 2022-04-28 | End: 2022-04-28 | Stop reason: HOSPADM

## 2022-04-28 RX ORDER — SODIUM CHLORIDE 0.9 % (FLUSH) 0.9 %
10 SYRINGE (ML) INJECTION
Status: CANCELLED | OUTPATIENT
Start: 2022-06-01

## 2022-04-28 RX ORDER — ACETAMINOPHEN 325 MG/1
650 TABLET ORAL
Status: CANCELLED | OUTPATIENT
Start: 2022-06-01

## 2022-04-28 RX ORDER — FAMOTIDINE 10 MG/ML
20 INJECTION INTRAVENOUS
Status: CANCELLED | OUTPATIENT
Start: 2022-06-01

## 2022-04-28 RX ORDER — HEPARIN 100 UNIT/ML
500 SYRINGE INTRAVENOUS
Status: CANCELLED | OUTPATIENT
Start: 2022-06-01

## 2022-04-28 RX ORDER — FAMOTIDINE 10 MG/ML
20 INJECTION INTRAVENOUS
Status: COMPLETED | OUTPATIENT
Start: 2022-04-28 | End: 2022-04-28

## 2022-04-28 RX ORDER — ACETAMINOPHEN 325 MG/1
650 TABLET ORAL
Status: COMPLETED | OUTPATIENT
Start: 2022-04-28 | End: 2022-04-28

## 2022-04-28 RX ORDER — SODIUM CHLORIDE 0.9 % (FLUSH) 0.9 %
10 SYRINGE (ML) INJECTION
Status: DISCONTINUED | OUTPATIENT
Start: 2022-04-28 | End: 2022-04-28 | Stop reason: HOSPADM

## 2022-04-28 RX ADMIN — SODIUM CHLORIDE: 0.9 INJECTION, SOLUTION INTRAVENOUS at 08:04

## 2022-04-28 RX ADMIN — HUMAN IMMUNOGLOBULIN G 30 G: 20 LIQUID INTRAVENOUS at 09:04

## 2022-04-28 RX ADMIN — FAMOTIDINE 20 MG: 10 INJECTION INTRAVENOUS at 09:04

## 2022-04-28 RX ADMIN — DIPHENHYDRAMINE HYDROCHLORIDE 50 MG: 50 INJECTION, SOLUTION INTRAMUSCULAR; INTRAVENOUS at 09:04

## 2022-04-28 RX ADMIN — ACETAMINOPHEN 650 MG: 325 TABLET ORAL at 09:04

## 2022-04-29 ENCOUNTER — TELEPHONE (OUTPATIENT)
Dept: INFUSION THERAPY | Facility: OTHER | Age: 77
End: 2022-04-29
Payer: MEDICARE

## 2022-04-29 NOTE — TELEPHONE ENCOUNTER
"Returned pt's  call. He states that she had another episode of forgetfulness/"fog" yesterday evening after infusion (which we had previously discussed about a slight confusion while she was here). While she seems to being having a better day today, he was instructed to contact Dr. Sahni's team to inform them of her episode of forgetfulness/confusion.      ----- Message from Credorax sent at 4/29/2022  9:14 AM CDT -----  Regarding: Call Back  Name of Who is Calling: Spencer   of EDUAR SEAY [[id number]]              What is the request in detail: Perfecto requesting a call back. States his wife has side affect after chemo. Please assist              Can the clinic reply by [facility]: No              What Number to Call Back if not in [facility]: Spencer [phone]// [phone]      "

## 2022-05-09 NOTE — PROGRESS NOTES
Occupational Therapy Treatment Note     Date: 4/21/2022  Name: Cecilia Munguia  Clinic Number: [id number]    Therapy Diagnosis:   Encounter Diagnoses   Name Primary?    Range of motion deficit Yes    Weakness of both hands      Physician: Ellen Sahni MD    Surgical Procedure and Date: n/a / Date of Injury/Onset: 18 years old, but having increasing difficulty the past several months with writing and painting.  Evaluation Date: 3/18/22  Insurance Authorization Period Expiration: 3/7/23  Plan of Care Expiration: 5/13/22  Progress Note Due: 4/18/22   Date of Return to MD: 8/11/22  Visit # / Visits authorized: 1 / 1  FOTO: initial eval     Time In: 1:00 pm  Time Out: 2:00 pm  Total Billable Time: 60 minutes    Precautions:  Standard, CIDP      Subjective     Pt reports: her orthosis is not working  she was compliant with home exercise program given last session.   Response to previous treatment: none noted yet    Pain: 0/10  Location: bilateral hands      Objective     Fabricated new orthosis today for bilateral hands to reduce ulnar drift and claw deformity.     Elbow and Wrist ROM. Measured in degrees.    3/22/2022 3/22/2022     Left Right   Elbow Ext/Flex       Supination/Pronation WFL WFL   Wrist Ext/Flex WFL WFL   Wrist RD/UD 5/28 10/40      Hand ROM. Measured in degrees.    3/22/2022 3/22/2022     Left Right     Ext/flex Ext/flex   Index: MP  WFL +30/WFL              PIP     -35/WFL -20/WFL              DIP -25/WFL -25/WFL              XIONG               Long:  MP WFL +20/WFL              PIP -65/WFL -40/WFL              DIP -20/WFL -10/WFL              XIONG               Ring:   MP WFL +40/WFL              PIP -55/WFL -52/WFL              DIP -15/WFL -10/WFL              XIONG               Small:  MP +10/WFL +55/WFL               PIP -80/WFL -80/WFL               DIP 50/WFL -25/WFL              XIONG               Thumb: MP 40 75                IP 53 85       Rad ADD/ABD 55 65       Pal ADD/ABD 55 unable           Opposition WFL, ~1 cm from DPC unable       Strength (Dynamometer) and Pinch Strength (Pinch Gauge)  Measured in pounds.    3/22/2022 3/22/2022     Left Right   Rung II 24 35   Key Pinch NT NT   3pt Pinch   unable   2pt Pinch   unable     Home Exercises and Education Provided     Education provided:   - Continue HEP  - Progress towards goals     Written Home Exercises Provided: Patient instructed to cont prior HEP.  Exercises were reviewed and Cecilia was able to demonstrate them prior to the end of the session.  Cecilia demonstrated good  understanding of the HEP provided.   .   See EMR under Patient Instructions for exercises provided prior visit.        Assessment     Fabricated a anti-claw, hand-based orthosis to bilateral hands  to improve functional hand use.  Instructed to wear as needed with removal for HEP, bathing/hygiene.  Instructed to monitor for pain/pressure, increased edema, or redness and to contact office for adjustments as needed. Patient reported good understanding of orthotic wear and care schedule.     Cecilia is progressing well towards her goals and there are no updates to goals at this time. Pt prognosis is Fair.     Pt will continue to benefit from skilled outpatient occupational therapy to address the deficits listed in the problem list on initial evaluation provide pt/family education and to maximize pt's level of independence in the home and community environment.     Anticipated barriers to occupational therapy: CIDP    Pt's spiritual, cultural and educational needs considered and pt agreeable to plan of care and goals.    Goals:  The following goals were discussed with the patient and patient is in agreement with them as to be addressed in the treatment plan.   Long Term Goals (LTGs); to be met by discharge.  LTG #1: Pt will report independence with orthosis wear. Progressing 4/21/2022  LTG #2: Pt will demo improved FOTO score by 20 points. Progressing 4/21/2022  LTG #3: Pt will return  to prior level of function for ADLs and household management.Progressing 4/21/2022  LTG #4: Pt will demonstrate improved B  strength by at least 3-5# for improved grasp of objects.  Progressing 4/21/2022     Short Term Goals (STGs); to be met within 4 weeks ().  STG #1:  Pt will demonstrate independence with issued HEP and orthosis wear. Progressing 4/21/2022    Plan   Continue with POC  Updates/Grading for next session: progress as tolerated      Aziza Méndez OT

## 2022-06-22 ENCOUNTER — TELEPHONE (OUTPATIENT)
Dept: NEUROLOGY | Facility: CLINIC | Age: 77
End: 2022-06-22
Payer: MEDICARE

## 2022-06-22 ENCOUNTER — INFUSION (OUTPATIENT)
Dept: INFUSION THERAPY | Facility: OTHER | Age: 77
End: 2022-06-22
Attending: STUDENT IN AN ORGANIZED HEALTH CARE EDUCATION/TRAINING PROGRAM
Payer: MEDICARE

## 2022-06-22 VITALS
DIASTOLIC BLOOD PRESSURE: 67 MMHG | WEIGHT: 145.5 LBS | SYSTOLIC BLOOD PRESSURE: 128 MMHG | HEART RATE: 62 BPM | HEIGHT: 69 IN | TEMPERATURE: 98 F | RESPIRATION RATE: 16 BRPM | BODY MASS INDEX: 21.55 KG/M2 | OXYGEN SATURATION: 98 %

## 2022-06-22 DIAGNOSIS — G61.81 CHRONIC INFLAMMATORY DEMYELINATING POLYNEUROPATHY: Primary | ICD-10-CM

## 2022-06-22 DIAGNOSIS — M81.0 OSTEOPOROSIS, UNSPECIFIED OSTEOPOROSIS TYPE, UNSPECIFIED PATHOLOGICAL FRACTURE PRESENCE: ICD-10-CM

## 2022-06-22 PROCEDURE — 96366 THER/PROPH/DIAG IV INF ADDON: CPT

## 2022-06-22 PROCEDURE — 96372 THER/PROPH/DIAG INJ SC/IM: CPT | Mod: 59

## 2022-06-22 PROCEDURE — 63600175 PHARM REV CODE 636 W HCPCS: Performed by: PSYCHIATRY & NEUROLOGY

## 2022-06-22 PROCEDURE — 25000003 PHARM REV CODE 250: Performed by: PSYCHIATRY & NEUROLOGY

## 2022-06-22 PROCEDURE — 96375 TX/PRO/DX INJ NEW DRUG ADDON: CPT

## 2022-06-22 PROCEDURE — 63600175 PHARM REV CODE 636 W HCPCS: Mod: JG | Performed by: INTERNAL MEDICINE

## 2022-06-22 PROCEDURE — 96365 THER/PROPH/DIAG IV INF INIT: CPT

## 2022-06-22 PROCEDURE — 96367 TX/PROPH/DG ADDL SEQ IV INF: CPT

## 2022-06-22 RX ORDER — HEPARIN 100 UNIT/ML
500 SYRINGE INTRAVENOUS
Status: CANCELLED | OUTPATIENT
Start: 2022-08-01

## 2022-06-22 RX ORDER — FAMOTIDINE 10 MG/ML
20 INJECTION INTRAVENOUS
Status: COMPLETED | OUTPATIENT
Start: 2022-06-22 | End: 2022-06-22

## 2022-06-22 RX ORDER — SODIUM CHLORIDE 0.9 % (FLUSH) 0.9 %
10 SYRINGE (ML) INJECTION
Status: CANCELLED | OUTPATIENT
Start: 2022-08-01

## 2022-06-22 RX ORDER — ACETAMINOPHEN 325 MG/1
650 TABLET ORAL
Status: CANCELLED | OUTPATIENT
Start: 2022-08-01

## 2022-06-22 RX ORDER — SODIUM CHLORIDE 0.9 % (FLUSH) 0.9 %
10 SYRINGE (ML) INJECTION
Status: DISCONTINUED | OUTPATIENT
Start: 2022-06-22 | End: 2022-06-22 | Stop reason: HOSPADM

## 2022-06-22 RX ORDER — ACETAMINOPHEN 325 MG/1
650 TABLET ORAL
Status: COMPLETED | OUTPATIENT
Start: 2022-06-22 | End: 2022-06-22

## 2022-06-22 RX ORDER — HEPARIN 100 UNIT/ML
500 SYRINGE INTRAVENOUS
Status: DISCONTINUED | OUTPATIENT
Start: 2022-06-22 | End: 2022-06-22 | Stop reason: HOSPADM

## 2022-06-22 RX ORDER — FAMOTIDINE 10 MG/ML
20 INJECTION INTRAVENOUS
Status: CANCELLED | OUTPATIENT
Start: 2022-08-01

## 2022-06-22 RX ADMIN — SODIUM CHLORIDE: 0.9 INJECTION, SOLUTION INTRAVENOUS at 08:06

## 2022-06-22 RX ADMIN — DIPHENHYDRAMINE HYDROCHLORIDE 50 MG: 50 INJECTION, SOLUTION INTRAMUSCULAR; INTRAVENOUS at 09:06

## 2022-06-22 RX ADMIN — HUMAN IMMUNOGLOBULIN G 30 G: 20 LIQUID INTRAVENOUS at 09:06

## 2022-06-22 RX ADMIN — DENOSUMAB 60 MG: 60 INJECTION SUBCUTANEOUS at 09:06

## 2022-06-22 RX ADMIN — ACETAMINOPHEN 650 MG: 325 TABLET ORAL at 09:06

## 2022-06-22 RX ADMIN — FAMOTIDINE 20 MG: 10 INJECTION INTRAVENOUS at 09:06

## 2022-06-22 NOTE — PLAN OF CARE
IVIG infusion and Prolia injection complete. Pt tolerated well. VSS. NAD. IV DC'd. Pt verbalized understanding of discharge instructions before leaving.

## 2022-06-22 NOTE — TELEPHONE ENCOUNTER
----- Message from Kennedi Montenegro sent at 6/22/2022  4:14 PM CDT -----  Contact: pt  Pt requesting call back from the staff RE: No details left      Confirmed contact below:  Contact Name:Ceciliadc Galdamezy  Phone Number: [phone]

## 2022-06-23 ENCOUNTER — INFUSION (OUTPATIENT)
Dept: INFUSION THERAPY | Facility: OTHER | Age: 77
End: 2022-06-23
Attending: STUDENT IN AN ORGANIZED HEALTH CARE EDUCATION/TRAINING PROGRAM
Payer: MEDICARE

## 2022-06-23 VITALS
HEART RATE: 57 BPM | RESPIRATION RATE: 16 BRPM | WEIGHT: 145.5 LBS | BODY MASS INDEX: 21.55 KG/M2 | HEIGHT: 69 IN | TEMPERATURE: 98 F | DIASTOLIC BLOOD PRESSURE: 70 MMHG | OXYGEN SATURATION: 96 % | SYSTOLIC BLOOD PRESSURE: 127 MMHG

## 2022-06-23 DIAGNOSIS — G61.81 CHRONIC INFLAMMATORY DEMYELINATING POLYNEUROPATHY: Primary | ICD-10-CM

## 2022-06-23 PROCEDURE — 25000003 PHARM REV CODE 250: Performed by: PSYCHIATRY & NEUROLOGY

## 2022-06-23 PROCEDURE — 96375 TX/PRO/DX INJ NEW DRUG ADDON: CPT

## 2022-06-23 PROCEDURE — 96365 THER/PROPH/DIAG IV INF INIT: CPT

## 2022-06-23 PROCEDURE — 63600175 PHARM REV CODE 636 W HCPCS: Mod: JG | Performed by: PSYCHIATRY & NEUROLOGY

## 2022-06-23 PROCEDURE — 96367 TX/PROPH/DG ADDL SEQ IV INF: CPT

## 2022-06-23 PROCEDURE — 96366 THER/PROPH/DIAG IV INF ADDON: CPT

## 2022-06-23 RX ORDER — HEPARIN 100 UNIT/ML
500 SYRINGE INTRAVENOUS
Status: DISCONTINUED | OUTPATIENT
Start: 2022-06-23 | End: 2022-06-23 | Stop reason: HOSPADM

## 2022-06-23 RX ORDER — SODIUM CHLORIDE 0.9 % (FLUSH) 0.9 %
10 SYRINGE (ML) INJECTION
Status: DISCONTINUED | OUTPATIENT
Start: 2022-06-23 | End: 2022-06-23 | Stop reason: HOSPADM

## 2022-06-23 RX ORDER — SODIUM CHLORIDE 0.9 % (FLUSH) 0.9 %
10 SYRINGE (ML) INJECTION
Status: CANCELLED | OUTPATIENT
Start: 2022-08-01

## 2022-06-23 RX ORDER — FAMOTIDINE 10 MG/ML
20 INJECTION INTRAVENOUS
Status: COMPLETED | OUTPATIENT
Start: 2022-06-23 | End: 2022-06-23

## 2022-06-23 RX ORDER — FAMOTIDINE 10 MG/ML
20 INJECTION INTRAVENOUS
Status: CANCELLED | OUTPATIENT
Start: 2022-08-01

## 2022-06-23 RX ORDER — ACETAMINOPHEN 325 MG/1
650 TABLET ORAL
Status: CANCELLED | OUTPATIENT
Start: 2022-08-01

## 2022-06-23 RX ORDER — HEPARIN 100 UNIT/ML
500 SYRINGE INTRAVENOUS
Status: CANCELLED | OUTPATIENT
Start: 2022-08-01

## 2022-06-23 RX ORDER — ACETAMINOPHEN 325 MG/1
650 TABLET ORAL
Status: COMPLETED | OUTPATIENT
Start: 2022-06-23 | End: 2022-06-23

## 2022-06-23 RX ADMIN — ACETAMINOPHEN 650 MG: 325 TABLET ORAL at 08:06

## 2022-06-23 RX ADMIN — DIPHENHYDRAMINE HYDROCHLORIDE 50 MG: 50 INJECTION, SOLUTION INTRAMUSCULAR; INTRAVENOUS at 08:06

## 2022-06-23 RX ADMIN — FAMOTIDINE 20 MG: 10 INJECTION INTRAVENOUS at 08:06

## 2022-06-23 RX ADMIN — SODIUM CHLORIDE: 0.9 INJECTION, SOLUTION INTRAVENOUS at 08:06

## 2022-06-23 RX ADMIN — HUMAN IMMUNOGLOBULIN G 30 G: 20 LIQUID INTRAVENOUS at 09:06

## 2022-06-28 ENCOUNTER — TELEPHONE (OUTPATIENT)
Dept: NEUROLOGY | Facility: CLINIC | Age: 77
End: 2022-06-28

## 2022-06-28 NOTE — TELEPHONE ENCOUNTER
----- Message from Gila Kaur RN sent at 6/23/2022 12:02 PM CDT -----  Hi there  We are trying to figure out who will be following Dr. Henry's pt's and who will be setting appointments for them. We see her IVIG patients and we are finding they have not been provided guidance as to which provider they will f/u with.    Can you assist for MsJame Nilda?   Thank you   LIANE Uriostegui

## 2022-07-18 ENCOUNTER — TELEPHONE (OUTPATIENT)
Dept: NEUROLOGY | Facility: CLINIC | Age: 77
End: 2022-07-18
Payer: MEDICARE

## 2022-07-18 NOTE — TELEPHONE ENCOUNTER
Returned call to Mrs. Brooks regarding establishing care with Dr. Maxime Glass.     Currently, receiving IVIG Infusions every 2 months at [facility].         Go Garcia RN, BSN, BS  [facility] Clinical Care Coordinator  [phone]

## 2022-07-21 ENCOUNTER — PES CALL (OUTPATIENT)
Dept: ADMINISTRATIVE | Facility: CLINIC | Age: 77
End: 2022-07-21
Payer: MEDICARE

## 2022-08-17 ENCOUNTER — TELEPHONE (OUTPATIENT)
Dept: OTOLARYNGOLOGY | Facility: CLINIC | Age: 77
End: 2022-08-17
Payer: MEDICARE

## 2022-08-17 ENCOUNTER — PATIENT MESSAGE (OUTPATIENT)
Dept: OTOLARYNGOLOGY | Facility: CLINIC | Age: 77
End: 2022-08-17
Payer: MEDICARE

## 2022-09-06 ENCOUNTER — TELEPHONE (OUTPATIENT)
Dept: OTOLARYNGOLOGY | Facility: CLINIC | Age: 77
End: 2022-09-06

## 2022-09-14 ENCOUNTER — TELEPHONE (OUTPATIENT)
Dept: NEUROLOGY | Facility: CLINIC | Age: 77
End: 2022-09-14
Payer: MEDICARE

## 2022-09-14 NOTE — TELEPHONE ENCOUNTER
Called pt to see if she would like to schedule a virtual follow up appt with Dr. Sahni. Pt declined and stated she had an appt scheduled with Dr. Glass but she was 30 mins late to the appt and was not seen. She would like another appt with Dr. Glass.

## 2022-10-13 ENCOUNTER — PATIENT MESSAGE (OUTPATIENT)
Dept: NEUROLOGY | Facility: CLINIC | Age: 77
End: 2022-10-13
Payer: COMMERCIAL

## 2023-03-20 ENCOUNTER — TELEPHONE (OUTPATIENT)
Dept: ENDOCRINOLOGY | Facility: CLINIC | Age: 78
End: 2023-03-20
Payer: COMMERCIAL

## 2023-03-20 NOTE — TELEPHONE ENCOUNTER
Called pt to get scheduled for an appt per . Patient did not want to make an appointment. Pt stated that PCP is taking care of her osteoporosis.

## 2023-03-22 ENCOUNTER — INFUSION (OUTPATIENT)
Dept: INFUSION THERAPY | Facility: OTHER | Age: 78
End: 2023-03-22
Attending: INTERNAL MEDICINE
Payer: MEDICARE

## 2023-03-22 VITALS
RESPIRATION RATE: 16 BRPM | HEART RATE: 73 BPM | TEMPERATURE: 98 F | SYSTOLIC BLOOD PRESSURE: 159 MMHG | DIASTOLIC BLOOD PRESSURE: 92 MMHG | OXYGEN SATURATION: 96 %

## 2023-03-22 DIAGNOSIS — M81.0 OSTEOPOROSIS, UNSPECIFIED OSTEOPOROSIS TYPE, UNSPECIFIED PATHOLOGICAL FRACTURE PRESENCE: Primary | ICD-10-CM

## 2023-03-22 DIAGNOSIS — G61.81 CHRONIC INFLAMMATORY DEMYELINATING POLYNEUROPATHY: ICD-10-CM

## 2023-03-22 PROCEDURE — 96372 THER/PROPH/DIAG INJ SC/IM: CPT

## 2023-03-22 PROCEDURE — 63600175 PHARM REV CODE 636 W HCPCS: Mod: JZ,JG | Performed by: INTERNAL MEDICINE

## 2023-03-22 RX ADMIN — DENOSUMAB 60 MG: 60 INJECTION SUBCUTANEOUS at 01:03

## 2023-03-22 NOTE — PLAN OF CARE
Prolia injection complete. Pt tolerated well. VSS. NAD.Pt verbalized understanding of discharge instructions before leaving.     Hemostasis: Electrocautery

## 2023-05-15 NOTE — PROCEDURES
"Ear Cerumen Removal    Date/Time: 7/7/2020 9:30 AM  Performed by: PRESTON Franco MD  Authorized by: PRESTON Franco MD     Time out: Immediately prior to procedure a "time out" was called to verify the correct patient, procedure, equipment, support staff and site/side marked as required.    Consent Done?:  Yes (Verbal)    Local anesthetic:  None  Location details:  Both ears  Procedure type comment:  8 Setswana suction and bayonet forceps  Cerumen  Removal Results:  Cerumen completely removed  Patient tolerance:  Patient tolerated the procedure well with no immediate complications      " 0 (no pain/absence of nonverbal indicators of pain)

## 2023-05-30 ENCOUNTER — OFFICE VISIT (OUTPATIENT)
Dept: OTOLARYNGOLOGY | Facility: CLINIC | Age: 78
End: 2023-05-30
Payer: MEDICARE

## 2023-05-30 DIAGNOSIS — Z97.4 WEARS HEARING AID IN BOTH EARS: ICD-10-CM

## 2023-05-30 DIAGNOSIS — R42 DIZZINESS: ICD-10-CM

## 2023-05-30 DIAGNOSIS — H93.13 TINNITUS OF BOTH EARS: ICD-10-CM

## 2023-05-30 DIAGNOSIS — H61.23 BILATERAL IMPACTED CERUMEN: Primary | ICD-10-CM

## 2023-05-30 DIAGNOSIS — H92.01 OTALGIA, RIGHT EAR: ICD-10-CM

## 2023-05-30 PROCEDURE — 69209 REMOVE IMPACTED EAR WAX UNI: CPT | Mod: PBBFAC

## 2023-05-30 PROCEDURE — 99999 PR PBB SHADOW E&M-EST. PATIENT-LVL III: ICD-10-PCS | Mod: PBBFAC,,,

## 2023-05-30 PROCEDURE — 69210 REMOVE IMPACTED EAR WAX UNI: CPT | Mod: S$PBB,,,

## 2023-05-30 PROCEDURE — 69210 REMOVE IMPACTED EAR WAX UNI: CPT | Mod: PBBFAC

## 2023-05-30 PROCEDURE — 99213 OFFICE O/P EST LOW 20 MIN: CPT | Mod: PBBFAC,25

## 2023-05-30 PROCEDURE — 69210 PR REMOVAL IMPACTED CERUMEN REQUIRING INSTRUMENTATION, UNILATERAL: ICD-10-PCS | Mod: S$PBB,,,

## 2023-05-30 PROCEDURE — 99214 OFFICE O/P EST MOD 30 MIN: CPT | Mod: 25,S$PBB,,

## 2023-05-30 PROCEDURE — 99214 PR OFFICE/OUTPT VISIT, EST, LEVL IV, 30-39 MIN: ICD-10-PCS | Mod: 25,S$PBB,,

## 2023-05-30 PROCEDURE — 99999 PR PBB SHADOW E&M-EST. PATIENT-LVL III: CPT | Mod: PBBFAC,,,

## 2023-05-30 NOTE — PROGRESS NOTES
Subjective:       Patient ID: Cecilia Munguia is a 77 y.o. female.    Chief Complaint: Cerumen Impaction    HPI  Ms. Munguia, is a 77 year old female, here today with complaints of bilateral cerumen impaction. She reports she wears bilateral hearing aids. She she has occasional otalgia in her right ear when she wakes up. She report bilateral tinnitus L>R off and on all day which interrupts her sleep. She reports she gets dizziness when she wakes up in the morning which last for 30 mins and improves with coffee.   Past Medical History:   Diagnosis Date    Anxiety     Balance problem     Chronic inflammatory demyelinating neuropathy     Depression     Diarrhea     Encounter for blood transfusion     GERD (gastroesophageal reflux disease)     [place] (hard of hearing)     Hyperlipidemia     Hypertension     IBS (irritable bowel syndrome)     Neuromuscular disorder     demyelinating neuropathy    Ulcer     several years ago     Past Surgical History:   Procedure Laterality Date    BREAST SURGERY       SECTION      COSMETIC SURGERY      ECTOPIC PREGNANCY SURGERY      HIP ARTHROPLASTY Right 3/11/2019    Procedure: ARTHROPLASTY, HIP;  Surgeon: Deonte Lynn MD;  Location: [facility];  Service: Orthopedics;  Laterality: Right;  [facility]    HIP ARTHROPLASTY Left 5/10/2021    Procedure: ARTHROPLASTY, HIP;  Surgeon: Deonte Lynn MD;  Location: [facility];  Service: Orthopedics;  Laterality: Left;  [facility]    JOINT REPLACEMENT Right 2019    hip    LAPAROSCOPIC APPENDECTOMY N/A 10/22/2021    Procedure: APPENDECTOMY, LAPAROSCOPIC;  Surgeon: Marco Antonio Avila Jr., MD;  Location: [facility] OR;  Service: General;  Laterality: N/A;    LAPAROSCOPIC CHOLECYSTECTOMY N/A 2020    Procedure: CHOLECYSTECTOMY, LAPAROSCOPIC;  Surgeon: Jeff Roman MD;  Location: [facility];  Service: General;  Laterality: N/A;    TONSILLECTOMY       Family History   Problem Relation Age of Onset    Diabetes Mother     Hypertension Father     Heart disease  Father     Diabetes Brother     Heart attack Brother     Heart disease Brother      Social History  Social History     Tobacco Use    Smoking status: Former     Packs/day: 0.00     Years: 30.00     Pack years: 0.00     Types: Cigarettes     Quit date: 2014     Years since quittin.4    Smokeless tobacco: Never    Tobacco comments:     1st [id number];quit again 2014   Substance Use Topics    Alcohol use: Yes     Alcohol/week: 6.0 - 7.0 standard drinks     Types: 6 - 7 Glasses of wine per week     Comment: 1-2 glasses of wine every evening    Drug use: No       Review of Systems   HENT: Positive for ear pain, hearing loss and ringing in the ears.  Negative for ear discharge and ear infection.      Respiratory:  Negative for shortness of breath.      Neurological: Positive for dizziness. Negative for headaches and light-headedness.              Objective:      Physical Exam  Vitals reviewed.   Constitutional:       General: She is not in acute distress.     Appearance: Normal appearance. She is not ill-appearing.   HENT:      Head: Normocephalic and atraumatic.      Right Ear: Tympanic membrane, ear canal and external ear normal. Decreased hearing noted. No laceration, drainage, swelling or tenderness. No middle ear effusion. There is impacted cerumen. No foreign body. No mastoid tenderness. No PE tube. No hemotympanum. Tympanic membrane is not injected, scarred, perforated, erythematous, retracted or bulging.      Left Ear: Tympanic membrane, ear canal and external ear normal. Decreased hearing noted. No laceration, drainage, swelling or tenderness.  No middle ear effusion. There is impacted cerumen. No foreign body. No mastoid tenderness. No PE tube. No hemotympanum. Tympanic membrane is not injected, scarred, perforated, erythematous, retracted or bulging.      Ears:        Nose: No nasal deformity or signs of injury.      Mouth/Throat:      Lips: Pink.   Pulmonary:      Effort: Pulmonary effort is  normal. No respiratory distress.   Neurological:      Mental Status: She is alert and oriented to person, place, and time.   Psychiatric:         Attention and Perception: Attention normal.         Mood and Affect: Mood normal.         Speech: Speech normal.         Behavior: Behavior normal. Behavior is cooperative.       Procedure Note:    The patient was brought to the minor procedure room and placed under the operating microscope. Using a combination of suction, curettes and cup forceps the patient's cerumen impaction was removed left ear canal. The tympanic membrane was evaluated and was unremarkable. The patient tolerated the procedure well. There were no complications.    Procedure Note:    Patient was brought to the minor procedure room and using the operating microscope the right ear canal  was cleaned of ceruminous debris. There was a significant cerumen impaction.  The forceps and suction were both used to perform this. Tympanic membrane intact. Pt tolerated well. There were no complications.  Assessment:       1. Bilateral impacted cerumen    2. Wears hearing aid in both ears    3. Dizziness    4. Tinnitus of both ears    5. Otalgia, right ear        Plan:       Cecilia was seen today for cerumen impaction.    Diagnoses and all orders for this visit:  Right ear otalgia   I recommended she change her hearing aid domes , causing right ear otalgia. Otologic microscopy exam normal, no infection, inflammation or drainage noted.   Bilateral impacted cerumen  Bilateral cerumen impaction removed under microscopy.  Patient tolerated procedure well and noted improvement upon impaction removal.  Otoscopic exam benign.  Education about normal ear hygiene and the avoidance of Q-tips was reviewed.     Wears hearing aid in both ears  I recommended she change her hearing aid domes , causing right ear otalgia. Otologic microscopy exam normal, no infection, inflammation or drainage noted.   Dizziness  Follow up if symptoms  worsen or persist.  Tinnitus bilateral ears   Follow up with new audiogram in the future.    RTC PRN   Questions answered.

## 2023-06-27 ENCOUNTER — OFFICE VISIT (OUTPATIENT)
Dept: OTOLARYNGOLOGY | Facility: CLINIC | Age: 78
End: 2023-06-27
Payer: MEDICARE

## 2023-06-27 DIAGNOSIS — Z97.4 WEARS HEARING AID IN BOTH EARS: ICD-10-CM

## 2023-06-27 DIAGNOSIS — R42 DIZZINESS: ICD-10-CM

## 2023-06-27 DIAGNOSIS — R26.89 IMBALANCE: Primary | ICD-10-CM

## 2023-06-27 PROCEDURE — 99214 OFFICE O/P EST MOD 30 MIN: CPT | Mod: S$PBB,,, | Performed by: NURSE PRACTITIONER

## 2023-06-27 PROCEDURE — 99214 PR OFFICE/OUTPT VISIT, EST, LEVL IV, 30-39 MIN: ICD-10-PCS | Mod: S$PBB,,, | Performed by: NURSE PRACTITIONER

## 2023-06-27 PROCEDURE — 99213 OFFICE O/P EST LOW 20 MIN: CPT | Mod: PBBFAC | Performed by: NURSE PRACTITIONER

## 2023-06-27 PROCEDURE — 99999 PR PBB SHADOW E&M-EST. PATIENT-LVL III: CPT | Mod: PBBFAC,,, | Performed by: NURSE PRACTITIONER

## 2023-06-27 PROCEDURE — 99999 PR PBB SHADOW E&M-EST. PATIENT-LVL III: ICD-10-PCS | Mod: PBBFAC,,, | Performed by: NURSE PRACTITIONER

## 2023-06-27 NOTE — PROGRESS NOTES
"Subjective:   Cecilia is a 77 y.o. female presents for imbalance concerns. She has been seen by ENT in the past for various issues. Most recently by CELY Srinivasan. She reports that she wakes up dizzy every morning and this feeling can take a few hours to pass. She believes it has been ongoing for many years, but it can fluctuate. She describes the sensation that her head "is full" which causes instability and imbalance. She needs to be careful to prevent a fall and will hold onto furniture for stability. She notes she feels better after exercise. She denies any vertigo or light headedness. She has had bilateral hip replacements and chronic inflammatory demyelinating polyneuropathy. She was being followed by neurology, but last visit appears to have been in September 2022. Denies migraines or headaches. Denies head traumas. Has bilateral SNHL and wears hearing aids bilaterally from [employer], but is not particularly pleased with them. 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     Note from previous visit on 6/1/2023 with CELY Srinivasan:  Ms. Munguia, is a 77 year old female, here today with complaints of bilateral cerumen impaction. She reports she wears bilateral hearing aids. She has occasional otalgia in her right ear when she wakes up. She report bilateral tinnitus L>R off and on all day which interrupts her sleep. She reports she gets dizziness when she wakes up in the morning which last for 30 mins and improves with coffee.   Right ear otalgia   I recommended she change her hearing aid domes , causing right ear otalgia. Otologic microscopy exam normal, no infection, inflammation or drainage noted.   Bilateral impacted cerumen  Bilateral cerumen impaction removed under microscopy.  Patient tolerated procedure well and noted improvement upon impaction removal.  Otoscopic exam benign.  Education about normal ear hygiene and the avoidance of Q-tips was reviewed.     Wears hearing " aid in both ears  I recommended she change her hearing aid domes , causing right ear otalgia. Otologic microscopy exam normal, no infection, inflammation or drainage noted.   Dizziness  Follow up if symptoms worsen or persist.  Tinnitus bilateral ears   Follow up with new audiogram in the future.    RTC PRN   Questions answered.   The patient's medications, allergies, past medical, surgical, social and family histories were reviewed and updated as appropriate.    A detailed review of systems was obtained with pertinent positives as per the above HPI, and otherwise negative.   Objective:     Constitutional:   She is oriented to person, place, and time. She appears well-developed and well-nourished. She appears alert. She is cooperative.  Non-toxic appearance. She does not have a sickly appearance. She does not appear ill. Normal speech.      Head:  Normocephalic and atraumatic. Not macrocephalic and not microcephalic. Head is without raccoon's eyes, without Lebron's sign, without abrasion, without laceration, without right periorbital erythema, without left periorbital erythema and without TMJ tenderness.     Ears:    Right Ear: No lacerations. No drainage, swelling or tenderness. No foreign bodies. No mastoid tenderness. Tympanic membrane is not injected, not scarred, not perforated, not erythematous, not retracted and not bulging. No middle ear effusion. No hemotympanum.   Left Ear: No lacerations. No drainage, swelling or tenderness. No foreign bodies. No mastoid tenderness. Tympanic membrane is not injected, not scarred, not perforated, not erythematous, not retracted and not bulging.  No middle ear effusion. No hemotympanum.   Wears hearing aids bilaterally    Pulmonary/Chest:   Effort normal.     Psychiatric:   She has a normal mood and affect. Her speech is normal and behavior is normal.     Neurological:   She is alert and oriented to person, place, and time. Gait abnormal.   Gianna-Hallpike Left: Negative for  torsional and up-beating nystagmus    [place]-Hallpike Right: Negative for torsional and up-beating nystagmus    Tandem Gait: abnormal    Heel to Shin: normal    Finger to nose & finger to finger test: No Dysmetria    Rapid alternating movements: No Dysdiadochokinesia    Romberg: Negative    Procedure  None    Audiogram      Imaging  I independently reviewed the MRI IAC/Temporal  dated 7/25/2019. Pertinent findings: There is no evidence for mass at the internal auditory canal. No signal abnormality of the 7th and 8th cranial nerves. No abnormal enhancement or signal is identified.   Assessment:     1. Imbalance    2. Dizziness    3. Wears hearing aid in both ears      Plan:     Imbalance/Dizziness  No concerning otologic findings today. Suspect multifactorial etiology. Discussed various factors related to dizziness and imbalance including: age-related changes, cardiovascular, orthopedic, vestibular or neurologic. Gianna-Hallpike was negative bilaterally and otoscopic exam was benign. Recommended Vestibular physical therapy for balance/ coordination training. Patient encouraged to be careful when moving from sitting to standing and to stay well hydrated. May also reconsider follow-up with neurology.   -     Ambulatory referral/consult to Physical/Occupational Therapy; Future    Wears hearing aid in both ears  Has an appointment scheduled at [facility] tomorrow for hearing aid check/adjustment.

## 2023-06-30 ENCOUNTER — CLINICAL SUPPORT (OUTPATIENT)
Dept: REHABILITATION | Facility: OTHER | Age: 78
End: 2023-06-30
Payer: MEDICARE

## 2023-06-30 DIAGNOSIS — R26.89 IMBALANCE: ICD-10-CM

## 2023-06-30 DIAGNOSIS — R42 DIZZINESS: ICD-10-CM

## 2023-06-30 PROCEDURE — 97110 THERAPEUTIC EXERCISES: CPT | Mod: PN | Performed by: PHYSICAL THERAPIST

## 2023-06-30 PROCEDURE — 97161 PT EVAL LOW COMPLEX 20 MIN: CPT | Mod: PN | Performed by: PHYSICAL THERAPIST

## 2023-06-30 NOTE — PROGRESS NOTES
[facility]  Physical Therapy Neurological Rehabilitation Initial Evaluation     Name: Cecilia MARRERO Nilda  Clinic Number: [id number]    Therapy Diagnosis:   Encounter Diagnoses   Name Primary?    Dizziness     Imbalance      Physician: Estela Schroeder NP    Physician Orders: PT Eval and Treat Dizziness  Medical Diagnosis from Referral: R42 (ICD-10-CM) - Dizziness R26.89 (ICD-10-CM) - Imbalance   Evaluation Date: 6/30/2023  Authorization Period Expiration: 06/26/2024  Plan of Care Expiration: 08/31/2023  Visit # / Visits authorized: 1/ 20    Precautions: Standard and Fall       Time in: 1038  Time Out: 1130  Total Billable Time: 52 minutes    Subjective      Date of onset: denies dizziness except for mornings when she wakes with ringing in her ears.  This isn't consistent and there doesn't appear to be a specific pattern. Reports having balance issues since 17 yo when dx and treated for a demyelinating neuro disorder.  Most recently she is concerned about fear of falling because she can often trip over her feet.    History of current condition - Cecilia reports: has been seen by ENT and PCP for this issue and referred to PT for balance training.     Imaging, none:     Prior Therapy: for HAYLEY bilaterally  Social History:  lives with their spouse  Falls: reports that last fall occurred about 2-3 years ago  DME: Straight cane; however,pt wants to avoid using cane if at all possible   Exercise Routine / History: goes to gym for elliptical and weight training 2-3 times per week.   Occupation: retired  Prior Level of Function: independent  Current Level of Function: modified independent; pt notes that she has developed a fear of falling that in concerning to her.    Pain: denies pain    Patient's goals: to improve balance in community and household ambulation    Medical History:   Past Medical History:   Diagnosis Date    Anxiety     Balance problem     Chronic inflammatory demyelinating neuropathy      Depression     Diarrhea     Encounter for blood transfusion     GERD (gastroesophageal reflux disease)     [place] (hard of hearing)     Hyperlipidemia     Hypertension     IBS (irritable bowel syndrome)     Neuromuscular disorder     demyelinating neuropathy    Ulcer     several years ago       Surgical History:   Cecilia Munguia  has a past surgical history that includes Cosmetic surgery;  section; Tonsillectomy; Ectopic pregnancy surgery; Breast surgery; Hip Arthroplasty (Right, 3/11/2019); Laparoscopic cholecystectomy (N/A, 2020); Joint replacement (Right, ); Hip Arthroplasty (Left, 5/10/2021); and Laparoscopic appendectomy (N/A, 10/22/2021).    Medications:   Cecilia has a current medication list which includes the following prescription(s): alclometasone, calcipotriene, cyanocobalamin, ergocalciferol, fluorouracil, hydrochlorothiazide, losartan, lumify, multivitamin, myrbetriq, ofloxacin, omeprazole, prevalite, prolensa, sertraline, simethicone, and toviaz.    Allergies:   Review of patient's allergies indicates:   Allergen Reactions    Sulfa (sulfonamide antibiotics) Other (See Comments)     shaking  Other reaction(s): Other (See Comments)  Pt states shaking and feeling bad        Objective      Mental status: alert, oriented to person, place, and time  Appearance: Casually dressed and Well groomed  Behavior:  calm and cooperative  Attention Span and Concentration:  Normal    Dominant hand:  right     Posture Alignment in sitting:   Head: forward head   Scapulae: increased kyphosis   Trunk: decreased lordosis        Posture Alignment in standing:   Head: forward head   Scapulae: increased kyphosis   Trunk: decreased lordosis   Pelvis: Posterior tilt   Legs: nnamdi knee bend     Sensation: Light Touch: Intact         Tone: 0 - No increase in muscle tone      Visual/Auditory: denies changes pt notes that she will see the hearing aid specialist today  Tracking:Intact  Saccades: Intact  Acuity:Intact  R/L  "discrimination: Intact  Visual field: Intact    Coordination:   - fine motor: limited with nnamdi hand deformities  - UE coordination: delayed    - LE coordination:  delayed    ROM:   UPPER EXTREMITY--AROM/PROM  (R) UE: WFLs  (L) UE: WFLs           RANGE OF MOTION--LOWER EXTREMITIES   Hip: equal bilaterally   Knee: equal bilaterally   Ankle: equal bilaterally    Strength: manual muscle test grades below   Upper Extremity Strength  (R) UE  (L) UE    Shoulder Flexion: 4/5 Shoulder Flexion: 4/5   Shoulder Abduction: 4/5 Shoulder Abduction: 4/5   Shoulder Extension: 4/5 Shoulder Extension:   4/5   Elbow Flexion: 4/5 Elbow Flexion: 4/5   Elbow Extension: 4/5 Elbow Extension: 4/5   Wrist Flexion: 4/5 Wrist Flexion: 4/5   Wrist Extension: 4/5 Wrist Extension: 4/5   : 4/5 : 4/5     Lower Extremity Strength  Right LE  Left LE    Hip Flexion: 4+/5 Hip Flexion: 4+/5   Hip Extension:  4+/5 Hip Extension: 4+/5   Hip Abduction: 4+/5 Hip Abduction: 4+/5   Hip Adduction: 4+/5 Hip Adduction 4+/5   Knee Extension: 4+/5 Knee Extension: 4+/5   Knee Flexion: 4+/5 Knee Flexion: 4+/5   Ankle Dorsiflexion: 3+/5 Ankle Dorsiflexion: 3+/5   Ankle Plantarflexion: 4/5 Ankle Plantarflexion: 4/5     Abdominal Strength: 4+/5    Flexibility: WFL     Evaluation   Single Limb Stance R LE 8  (<10 sec = HIGH FALL RISK)   Single Limb Stance L LE 8  (<10 sec = HIGH FALL RISK)       Postural control:  MCTSIB:  1. Eyes Open/feet together/Firm: 30 seconds  2. Eyes Closed/feet together/Firm: 15", 8" seconds  3. Eyes Open/feet together/Foam: Fail seconds  4. Eyes Closed/feet together/Foam: Fail seconds    Gait Assessment:   - AD used: none  - Assistance: Indep  - Distance: community    GAIT DEVIATIONS:  Cecilia displays the following deviations with ambulation: wide base of support; decreased TO and HS bilaterally    Impairments contributing to deviations: impaired motor control    Endurance Deficit: None          Functional Gait Assessment:   1. Gait on " level surface =  2  2. Change in Gait Speed = 2  3. Gait with horizontal head turns  = 2  4. Gait with vertical head turns = 2  5. Gait with pivot turns = 2  6. Step over obstacle = 2  7. Gait with Narrow MANOJ = 2  8. Gait with eyes closed = 1  9. Ambulating Backwards = 1  10. Steps = 2     Score 18/30   Score:   <22/30 fall risk   <20/30 fall risk in older adults   <18/30 fall risk in Parkinsons     Functional Mobility (Bed mobility, transfers)  Bed mobility: I  Supine to sit: I  Sit to supine: I  Transfers to bed: I  Transfers to toilet: I  Sit to stand:  I  Stand pivot:  I  Car transfers: Mod I    ADL's:  Feeding: I  Grooming: I  Hygiene: I  UB Dressing: I  LB Dressing: I  Toileting: I  Bathing: I        Treatment     Total Treatment time separate from Evaluation: 10 minutes    Cecilia received the treatments listed below:      therapeutic exercises to develop strength, endurance, ROM, flexibility, posture, and core stabilization for 10 minutes including:  Seated Ankle Dorsiflexion with Red TB x10 reps    neuromuscular re-education activities to improve: Balance, Coordination, Kinesthetic, Sense, Proprioception, and Posture for 00 minutes. The following activities were included:      therapeutic activities to improve functional performance for 00  minutes, including:      gait training to improve functional mobility and safety for 00  minutes, including:      Patient Education and Home Exercises     Education provided:   Written Home Exercises Provided: yes.  Exercises were reviewed and Cecilia was able to demonstrate them prior to the end of the session.  Cecilia demonstrated good  understanding of the education provided.     See EMR under Patient Instructions for exercises provided 6/30/2023.    Assessment     Cecilia is a 77 y.o. female referred to outpatient Physical Therapy with a medical diagnosis of Imbalance. Patient presents with Decreased ankle strength bilaterally, decreased sensory integration, gait  deviations, increased fall risk.    Patient prognosis is Excellent.   Patient will benefit from skilled outpatient Physical Therapy to address the deficits stated above and in the chart below, provide patient /family education, and to maximize patientt's level of independence.     Plan of care discussed with patient: Yes  Patient's spiritual, cultural and educational needs considered and patient is agreeable to the plan of care and goals as stated below:     Anticipated Barriers for therapy: none    Medical Necessity is demonstrated by the following  History  Co-morbidities and personal factors that may impact the plan of care [x] LOW: no personal factors / co-morbidities  [] MODERATE: 1-2 personal factors / co-morbidities  [] HIGH: 3+ personal factors / co-morbidities    Moderate / High Support Documentation:   Co-morbidities affecting plan of care: long standing neurological disorder    Personal Factors:   age     Examination  Body Structures and Functions, activity limitations and participation restrictions that may impact the plan of care [x] LOW: addressing 1-2 elements  [] MODERATE: 3+ elements  [] HIGH: 4+ elements (please support below)    Moderate / High Support Documentation:      Clinical Presentation [x] LOW: stable  [] MODERATE: Evolving  [] HIGH: Unstable     Decision Making/ Complexity Score: low       Goals:  The following goals were discussed with the patient and patient is in agreement with them as to be addressed in the treatment plan.      STG'S: 3 weeks  1. Patient independent in HEP of balance, habituation, head-eye coordination, gaze stabilization, and/or repositioning.  Exercises to be done Daily.  2. Increase FGA to >20/30 to decrease risk of falling for home ambulation  3. Decrease patient's subjective rating of dizziness to a  4  (on 0-10 scale) or less as baseline.     LTG'S  : 6 weeks  1. Patient able to ambulate in community safely without assistive device, on varied terrainwith head  movement, without LOB or c/o dizziness.  2. Patient's subjective rating of dizziness will decreased to 0-2 on 0-10 scale  3. Patient able to perform community activities without LOB or c/o dizziness  4. Pt to be I with self management of condition and progression balance program for maintenance.  2. Increase FGA to >22/30 to decrease risk of falling for Community ambulation.  Plan     Plan of care Certification: 6/30/2023 to 08/011/2023.    Outpatient Physical Therapy 2 times weekly for 6 weeks to include the following interventions: Gait Training, Neuromuscular Re-ed, Patient Education, Self Care, Therapeutic Activities, and Therapeutic Exercise.     Selma Argueta, PT, DPT, MHA, CLT

## 2023-07-24 ENCOUNTER — DOCUMENTATION ONLY (OUTPATIENT)
Dept: REHABILITATION | Facility: OTHER | Age: 78
End: 2023-07-24
Payer: MEDICARE

## 2023-07-24 NOTE — PROGRESS NOTES
Patient was scheduled for a follow up physical therapy appointment at [facility] location on 07/24/2023 . Patient failed to appear for the appointment without prior notification today.       Bartolo Acosta, PTA  07/24/2023

## 2023-07-26 ENCOUNTER — PATIENT MESSAGE (OUTPATIENT)
Dept: REHABILITATION | Facility: OTHER | Age: 78
End: 2023-07-26
Payer: MEDICARE

## 2023-09-20 ENCOUNTER — INFUSION (OUTPATIENT)
Dept: INFUSION THERAPY | Facility: OTHER | Age: 78
End: 2023-09-20
Attending: INTERNAL MEDICINE
Payer: MEDICARE

## 2023-09-20 VITALS
SYSTOLIC BLOOD PRESSURE: 134 MMHG | TEMPERATURE: 98 F | RESPIRATION RATE: 16 BRPM | HEART RATE: 61 BPM | OXYGEN SATURATION: 98 % | DIASTOLIC BLOOD PRESSURE: 70 MMHG

## 2023-09-20 DIAGNOSIS — G61.81 CHRONIC INFLAMMATORY DEMYELINATING POLYNEUROPATHY: ICD-10-CM

## 2023-09-20 DIAGNOSIS — M81.0 OSTEOPOROSIS, UNSPECIFIED OSTEOPOROSIS TYPE, UNSPECIFIED PATHOLOGICAL FRACTURE PRESENCE: Primary | ICD-10-CM

## 2023-09-20 PROCEDURE — 96372 THER/PROPH/DIAG INJ SC/IM: CPT

## 2023-09-20 PROCEDURE — 63600175 PHARM REV CODE 636 W HCPCS: Mod: JZ,JG | Performed by: INTERNAL MEDICINE

## 2023-09-20 RX ADMIN — DENOSUMAB 60 MG: 60 INJECTION SUBCUTANEOUS at 01:09

## 2023-10-09 ENCOUNTER — TELEPHONE (OUTPATIENT)
Dept: OTOLARYNGOLOGY | Facility: CLINIC | Age: 78
End: 2023-10-09
Payer: MEDICARE

## 2023-10-09 NOTE — TELEPHONE ENCOUNTER
spoke w/pt who had the wrong [facility]. she needed [facility] to discuss letter of hearing aides.she will call back and speak to that campus.

## 2023-10-15 DIAGNOSIS — I10 ESSENTIAL HYPERTENSION: Chronic | ICD-10-CM

## 2023-10-16 RX ORDER — LOSARTAN POTASSIUM 50 MG/1
TABLET ORAL
Qty: 90 TABLET | Refills: 3 | Status: SHIPPED | OUTPATIENT
Start: 2023-10-16

## 2023-10-24 ENCOUNTER — OFFICE VISIT (OUTPATIENT)
Dept: OTOLARYNGOLOGY | Facility: CLINIC | Age: 78
End: 2023-10-24
Payer: MEDICARE

## 2023-10-24 VITALS
WEIGHT: 143 LBS | SYSTOLIC BLOOD PRESSURE: 127 MMHG | HEART RATE: 79 BPM | HEIGHT: 66 IN | DIASTOLIC BLOOD PRESSURE: 85 MMHG | BODY MASS INDEX: 22.98 KG/M2

## 2023-10-24 DIAGNOSIS — R04.0 EPISTAXIS: Primary | ICD-10-CM

## 2023-10-24 PROCEDURE — 30901 PR CTRL 2SEBLEED,ANTER,SIMPLE: ICD-10-PCS | Mod: LT,S$GLB,, | Performed by: OTOLARYNGOLOGY

## 2023-10-24 PROCEDURE — 99213 OFFICE O/P EST LOW 20 MIN: CPT | Mod: 25,S$GLB,, | Performed by: OTOLARYNGOLOGY

## 2023-10-24 PROCEDURE — 30901 CONTROL OF NOSEBLEED: CPT | Mod: LT,S$GLB,, | Performed by: OTOLARYNGOLOGY

## 2023-10-24 PROCEDURE — 99213 PR OFFICE/OUTPT VISIT, EST, LEVL III, 20-29 MIN: ICD-10-PCS | Mod: 25,S$GLB,, | Performed by: OTOLARYNGOLOGY

## 2023-12-11 RX ORDER — ERGOCALCIFEROL 1.25 MG/1
50000 CAPSULE ORAL
Qty: 12 CAPSULE | Refills: 3 | Status: SHIPPED | OUTPATIENT
Start: 2023-12-11 | End: 2024-01-12

## 2023-12-11 RX ORDER — HYDROCHLOROTHIAZIDE 12.5 MG/1
TABLET ORAL
Qty: 90 TABLET | Refills: 3 | Status: SHIPPED | OUTPATIENT
Start: 2023-12-11

## 2024-03-20 ENCOUNTER — INFUSION (OUTPATIENT)
Dept: INFUSION THERAPY | Facility: OTHER | Age: 79
End: 2024-03-20
Attending: INTERNAL MEDICINE
Payer: MEDICARE

## 2024-03-20 VITALS
DIASTOLIC BLOOD PRESSURE: 72 MMHG | TEMPERATURE: 98 F | OXYGEN SATURATION: 97 % | RESPIRATION RATE: 16 BRPM | HEART RATE: 71 BPM | SYSTOLIC BLOOD PRESSURE: 145 MMHG

## 2024-03-20 DIAGNOSIS — G61.81 CHRONIC INFLAMMATORY DEMYELINATING POLYNEUROPATHY: Primary | ICD-10-CM

## 2024-03-20 DIAGNOSIS — M81.0 OSTEOPOROSIS, UNSPECIFIED OSTEOPOROSIS TYPE, UNSPECIFIED PATHOLOGICAL FRACTURE PRESENCE: ICD-10-CM

## 2024-03-20 PROCEDURE — 96372 THER/PROPH/DIAG INJ SC/IM: CPT

## 2024-03-20 PROCEDURE — 63600175 PHARM REV CODE 636 W HCPCS: Mod: JZ,JG | Performed by: INTERNAL MEDICINE

## 2024-03-20 RX ADMIN — DENOSUMAB 60 MG: 60 INJECTION SUBCUTANEOUS at 02:03

## 2024-03-20 NOTE — PLAN OF CARE
Prolia subcutaneous injection administered, no reaction. Patient tolerated well. No apparent distress noted. Discharge instructions given to patient. Patient understands instructions. Follow up appointment scheduled.

## 2024-04-22 ENCOUNTER — HOSPITAL ENCOUNTER (OUTPATIENT)
Dept: RADIOLOGY | Facility: OTHER | Age: 79
Discharge: HOME | End: 2024-04-22
Attending: INTERNAL MEDICINE
Payer: MEDICARE

## 2024-04-22 DIAGNOSIS — Z87.891 PERSONAL HISTORY OF NICOTINE DEPENDENCE: ICD-10-CM

## 2024-04-22 DIAGNOSIS — R06.02 SHORTNESS OF BREATH: ICD-10-CM

## 2024-04-22 PROCEDURE — 71271 CT THORAX LUNG CANCER SCR C-: CPT | Mod: TC

## 2024-04-22 PROCEDURE — 71271 CT THORAX LUNG CANCER SCR C-: CPT | Mod: 26,,, | Performed by: RADIOLOGY

## 2024-07-23 ENCOUNTER — TELEPHONE (OUTPATIENT)
Dept: NEUROLOGY | Facility: CLINIC | Age: 79
End: 2024-07-23
Payer: MEDICARE

## 2024-07-23 NOTE — TELEPHONE ENCOUNTER
Spoke with patient to coordinate scheduling appointments for [facility].     Pt was amidst grocery shopping, and subsequently spoke with her . Offered next available appointment via virtual visit tomorrow at 9 am with Tiffanie Shipley. Pt accepted next available, verbalized understanding, and repeated back date/time/logistics of scheduled appointment.     Educated patient about clinic operations and informed that patient will see a NP for initial/pre-visit then see Tiffanie and Dr. Robertson together in clinic.     Patient is scheduled:   Virtual Visit with Tiffanie on July 24 at 9 am  Team In-Person with Dr. Robertson and Tiffanie on August 14 at 8:30 am

## 2024-07-24 ENCOUNTER — OFFICE VISIT (OUTPATIENT)
Dept: NEUROLOGY | Facility: CLINIC | Age: 79
End: 2024-07-24
Payer: MEDICARE

## 2024-07-24 DIAGNOSIS — H91.93 BILATERAL HEARING LOSS, UNSPECIFIED HEARING LOSS TYPE: ICD-10-CM

## 2024-07-24 DIAGNOSIS — F32.A DEPRESSION, UNSPECIFIED DEPRESSION TYPE: ICD-10-CM

## 2024-07-24 DIAGNOSIS — R41.3 OTHER AMNESIA: Primary | ICD-10-CM

## 2024-07-24 DIAGNOSIS — G61.81 CHRONIC INFLAMMATORY DEMYELINATING POLYNEUROPATHY: ICD-10-CM

## 2024-07-24 PROCEDURE — 99417 PROLNG OP E/M EACH 15 MIN: CPT | Mod: 95,,, | Performed by: NURSE PRACTITIONER

## 2024-07-24 PROCEDURE — 99215 OFFICE O/P EST HI 40 MIN: CPT | Mod: 95,,, | Performed by: NURSE PRACTITIONER

## 2024-07-25 ENCOUNTER — TELEPHONE (OUTPATIENT)
Dept: NEUROLOGY | Facility: CLINIC | Age: 79
End: 2024-07-25
Payer: MEDICARE

## 2024-07-25 NOTE — TELEPHONE ENCOUNTER
Called patient today about helping her schedule her MRI and Labs. Patient did not . Left a message.

## 2024-07-25 NOTE — TELEPHONE ENCOUNTER
Patient called back to schedule MRI and Labs non Fasting. Patient's  picks us the phone. They would like the MRI and Labs on the same day for a one stop shop. I scheduled both there MRI and Labs August 6, MRI is at 4:15pm and Labs at 3:25pm.

## 2024-08-06 ENCOUNTER — HOSPITAL ENCOUNTER (OUTPATIENT)
Dept: RADIOLOGY | Facility: HOSPITAL | Age: 79
Discharge: HOME | End: 2024-08-06
Attending: NURSE PRACTITIONER
Payer: MEDICARE

## 2024-08-06 DIAGNOSIS — R41.3 OTHER AMNESIA: ICD-10-CM

## 2024-08-06 PROCEDURE — 70551 MRI BRAIN STEM W/O DYE: CPT | Mod: TC

## 2024-08-06 PROCEDURE — 70551 MRI BRAIN STEM W/O DYE: CPT | Mod: 26,,, | Performed by: RADIOLOGY

## 2024-08-14 ENCOUNTER — OFFICE VISIT (OUTPATIENT)
Dept: NEUROLOGY | Facility: CLINIC | Age: 79
End: 2024-08-14
Payer: MEDICARE

## 2024-08-14 VITALS
SYSTOLIC BLOOD PRESSURE: 117 MMHG | HEART RATE: 77 BPM | BODY MASS INDEX: 22.71 KG/M2 | HEIGHT: 66 IN | WEIGHT: 141.31 LBS | DIASTOLIC BLOOD PRESSURE: 78 MMHG

## 2024-08-14 DIAGNOSIS — G30.9 MAJOR NEUROCOGNITIVE DISORDER DUE TO ALZHEIMER'S DISEASE, WITHOUT BEHAVIORAL DISTURBANCE: Primary | ICD-10-CM

## 2024-08-14 DIAGNOSIS — F02.80 MAJOR NEUROCOGNITIVE DISORDER DUE TO ALZHEIMER'S DISEASE, WITHOUT BEHAVIORAL DISTURBANCE: Primary | ICD-10-CM

## 2024-08-14 PROCEDURE — 99367 TEAM CONF W/O PAT BY PHYS: CPT | Mod: S$PBB,,, | Performed by: PSYCHIATRY & NEUROLOGY

## 2024-08-14 PROCEDURE — 99215 OFFICE O/P EST HI 40 MIN: CPT | Mod: S$PBB,,, | Performed by: PSYCHIATRY & NEUROLOGY

## 2024-08-14 PROCEDURE — 99417 PROLNG OP E/M EACH 15 MIN: CPT | Mod: S$PBB,,, | Performed by: PSYCHIATRY & NEUROLOGY

## 2024-08-14 PROCEDURE — 99999 PR PBB SHADOW E&M-EST. PATIENT-LVL IV: CPT | Mod: PBBFAC,,, | Performed by: PSYCHIATRY & NEUROLOGY

## 2024-08-14 PROCEDURE — 99214 OFFICE O/P EST MOD 30 MIN: CPT | Mod: PBBFAC | Performed by: PSYCHIATRY & NEUROLOGY

## 2024-08-14 RX ORDER — DONEPEZIL HYDROCHLORIDE 10 MG/1
10 TABLET, FILM COATED ORAL NIGHTLY
Qty: 30 TABLET | Refills: 11 | Status: SHIPPED | OUTPATIENT
Start: 2024-08-14

## 2024-08-14 NOTE — PROGRESS NOTES
8-14-24     interviewed alone:    Memory & cognitive ability comes and goes. 80-90% of time, you would not notice it.    He's not sure if this aging. She is 7 years older than him.   If she is stressed or not gotten enough sleep, she is worse.      If drinks too much, she worse. She tries to limit self to couple of drinks per day but now having problems self limiting. She did not have trouble self limiting 3-4 years ago.   Started drinking more regularly after Emily, usually one to two drinks per night.    On average now, she drinks 8 oz of wine nightly but may take more sips when he is not looking.   He started thinking her memory impariment was related to alcohol.    In the past 3-5 years, he noticed she had difficulty with problem solving. She was never  an over acheiver in school.     Got a new stove and mircowave about 1.5-2 years ago. She cannot learn how to operate it.       Couple years ago, she lost her car at [employer]. She could not recall which parking lot she was in.   Called . He was able to find by logging into scott.      After Chrsitmas 2023, they went to [place] and split up to shop and had designated meeting spot. She forgot where they were supposed to meet. She ended up in the office at [facility].     They talked to her PCP last year about her memory and was referred to neurology. Ended up deciding not to proceed with appointment. He is   He is afraid of a label of a memory issue, Fears eveyone would treat her differently. He can see things are happening. He hopes this does not get worse.     Her hearing is an issue. She has hearing aids and does wear them. Sometimes will realize she has not put aids in.     Sometimes, she will back into sleeping Italian.       He is also concerned about depression. She has been on the same dose of sertraline for a long time. She is also taking a new medicine. He is not sure if something needs to be adjusted or she needs to stop one of the  medications. She does not tell him she is depressed.      to rejoin his wife and Dr. Robertson for interpretation of labs, MRI, MoCA and PE.     Follow up TBD.     ..  .Tiffanie Shipley, JERRICA, NP-C
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
## 2024-08-20 ENCOUNTER — PATIENT MESSAGE (OUTPATIENT)
Dept: NEUROLOGY | Facility: CLINIC | Age: 79
End: 2024-08-20
Payer: MEDICARE

## 2024-08-21 ENCOUNTER — TELEPHONE (OUTPATIENT)
Dept: NEUROLOGY | Facility: CLINIC | Age: 79
End: 2024-08-21
Payer: MEDICARE

## 2024-08-21 NOTE — TELEPHONE ENCOUNTER
Rosi received vm from pt's  and care partner regarding pt's medication change and stated that he was considering tapering her wellbutrin dosage as he had seen a friend of his do. Rosi encouraged medications to be taken as prescribed until provider gave further instructions. Rosi f/u with Dr. Robertson re: pt meds.     Sw discussed questions care partner had re: last weeks appt. He would like to meet with sw to discuss interpersonal/behavioral concerns but he would also like to bring in son for psychoeducation. Rosi provided availability for following week (8/28 pm & 8/30 am).   Pt CP will follow up to schedule time when son is available.

## 2024-08-22 ENCOUNTER — PATIENT OUTREACH (OUTPATIENT)
Dept: NEUROLOGY | Facility: CLINIC | Age: 79
End: 2024-08-22
Payer: MEDICARE

## 2024-08-22 NOTE — PROGRESS NOTES
Dementia Care Management    Recruitment Call      Date of Service: 2024  Care Navigator completing this form: Rachel Chavez  Referred by: Dr. Tiffanie Shipley  PCP: Kapil León MD    Patient: Cecilia Munguia  MRN: [id number]  : 1945  Age: 79 y.o.  Gender: female  Race: White  Ethnicity: Not  or /a    Objective:   Patient and caregiver potentially eligible for [facility] dementia care management programs.    CTN/SW completed outreach attempt on 2024 to assess patient/caregiver interest in the program and screen for eligibility.       Outreach Outcome:   CTN spoke to patient and caregiver, offered GUIDE services. Caregiver reported they have started working with Martha Keyes LMSW in Neurology. He requested CTN send program information via email and wants to run it by Martha prior to deciding to join. CTN messaged LMSW and referring provider to relay information and plan for next steps.

## 2024-08-22 NOTE — LETTER
August 22, 2024    Cecilia Munguia  [address] [facility] [address]             [facility] [address]  [address]  [facility] [address]  Phone: [phone]  Fax: [phone] Dear  and Mrs. Munguia:    I hope this message finds you well.    We recently received a referral from Dr. Tiffanie Shipley for the GUIDE Model program, a Medicare-funded dementia care management initiative designed to provide comprehensive support to individuals experiencing memory and cognitive changes, as well as their families.    Attached, youll find a handout with detailed information about the program. Please feel free to reach out to me if you have any questions or need further assistance. My contact information is listed below my signature.    Thank you for your attention to this matter. I look forward to assisting you.        Sincerely,  Rachel Chavez    [facility]  [phone]  [email or web address]

## 2024-08-23 NOTE — TELEPHONE ENCOUNTER
Pt care partner f/u w/sw to schedule virtual meeting for 8/30/24 at 10am for pt, self, son, & daughter-in-law.

## 2024-08-30 ENCOUNTER — DOCUMENTATION ONLY (OUTPATIENT)
Dept: NEUROLOGY | Facility: CLINIC | Age: 79
End: 2024-08-30
Payer: MEDICARE

## 2024-08-30 NOTE — Clinical Note
Hey y'all, quick update: pt and  started tapering pt wellbutrin on their own per google recommendations.

## 2024-08-30 NOTE — PROGRESS NOTES
Sw met, virtually, with pt, pt's  (Spencer), and son (Mika).   Sw and pt family reviewed GUIDE protocols and structures. Family would like to meet privately and discuss before moving forward with GUIDE.   Sw and pt family discussed pt's medications. They have begun tapering pt's bupropion without medical guidance and will be done tapering next week. Sw will inform MD and reiterated the limitations of sw scope of practice and the importance of taking medication as prescribed.   Sw answered questions about GUIDE, how to contact team, and disease progression. Pt expressed frustration that disease will progress regardless of medication and hopelessness that nothing will help. Sw reiterated that while the disease is progressive, the goals of medication and healthy behaviors is to slow progression and improve quality of life for as long as possible. Pt left call and sw continued conversation w/pt's .   Pt's  requested resources to learn about dementia and discussed previous reluctance to learn about disease. Sw to send email with support group information and pt  will stop by sw office to  books about dementia on 9/3/24.

## 2024-09-20 ENCOUNTER — INFUSION (OUTPATIENT)
Dept: INFUSION THERAPY | Facility: OTHER | Age: 79
End: 2024-09-20
Attending: INTERNAL MEDICINE
Payer: MEDICARE

## 2024-09-20 VITALS
OXYGEN SATURATION: 99 % | RESPIRATION RATE: 16 BRPM | HEART RATE: 59 BPM | SYSTOLIC BLOOD PRESSURE: 127 MMHG | DIASTOLIC BLOOD PRESSURE: 63 MMHG

## 2024-09-20 DIAGNOSIS — M81.0 OSTEOPOROSIS, UNSPECIFIED OSTEOPOROSIS TYPE, UNSPECIFIED PATHOLOGICAL FRACTURE PRESENCE: ICD-10-CM

## 2024-09-20 DIAGNOSIS — G61.81 CHRONIC INFLAMMATORY DEMYELINATING POLYNEUROPATHY: Primary | ICD-10-CM

## 2024-09-20 PROCEDURE — 63600175 PHARM REV CODE 636 W HCPCS: Mod: JZ,JG | Performed by: INTERNAL MEDICINE

## 2024-09-20 PROCEDURE — 96372 THER/PROPH/DIAG INJ SC/IM: CPT

## 2024-09-20 RX ADMIN — DENOSUMAB 60 MG: 60 INJECTION SUBCUTANEOUS at 11:09

## 2024-09-20 NOTE — PLAN OF CARE
Vital Signs Stable, No apparent distress noted; Pt tolerated Prolia  w/o difficulty.  No bleeding,swelling or drainage noted to the site.  AVS/Discharge instructions reviewed;all questions answered ;Pt verbalizes understanding. Follow up appts per karelt; ambulated from clinic in [facility], accompanied by

## 2024-09-24 ENCOUNTER — PATIENT MESSAGE (OUTPATIENT)
Dept: NEUROLOGY | Facility: CLINIC | Age: 79
End: 2024-09-24
Payer: MEDICARE

## 2024-09-26 ENCOUNTER — TELEPHONE (OUTPATIENT)
Dept: NEUROLOGY | Facility: CLINIC | Age: 79
End: 2024-09-26
Payer: MEDICARE

## 2024-09-26 NOTE — TELEPHONE ENCOUNTER
Called Patient today about scheduling a appointment to see the provide on a Wednesday afternoon in November. Patient has been scheduled.

## 2024-10-14 ENCOUNTER — PATIENT OUTREACH (OUTPATIENT)
Dept: NEUROLOGY | Facility: CLINIC | Age: 79
End: 2024-10-14
Payer: MEDICARE

## 2024-10-14 NOTE — PROGRESS NOTES
Dementia Care Management    Recruitment Call      Date of Service: 10/14/2024  Care Navigator completing this form: Rachel Chavez  Referred by: Other Dr. Dominic Robertson and Dr. Tiffanie Shipley    PCP: Kapil León MD    Patient: Cecilia Munguia  MRN: [id number]  : 1945  Age: 79 y.o.  Gender: female  Race: White  Ethnicity: Not  or /a    Objective:   Patient and caregiver potentially eligible for [facility] dementia care management programs.    CTN/SW completed outreach attempt on 10/14/2024 to assess patient/caregiver interest in the program and screen for eligibility.       Outreach Outcome:   Declined - Patient/Caregiver not interested in receiving dementia care management at this time. CTN/SW shared contact information with the dyad for them to reach out if interested in receiving care management services in the future.    Caregiver feels things are going well. CTN encouraged hi to notify Dr. Robertson and Dr. Shipley if he ever felt the patient's needs were increasing and would be interested in joining the program.    Dyad screened for: CMS GUIDE Model

## 2024-10-15 ENCOUNTER — OFFICE VISIT (OUTPATIENT)
Dept: OTOLARYNGOLOGY | Facility: CLINIC | Age: 79
End: 2024-10-15
Payer: MEDICARE

## 2024-10-15 VITALS
HEART RATE: 61 BPM | WEIGHT: 141.31 LBS | BODY MASS INDEX: 22.71 KG/M2 | DIASTOLIC BLOOD PRESSURE: 73 MMHG | SYSTOLIC BLOOD PRESSURE: 116 MMHG | HEIGHT: 66 IN

## 2024-10-15 DIAGNOSIS — H93.12 TINNITUS OF LEFT EAR: ICD-10-CM

## 2024-10-15 DIAGNOSIS — H90.3 SENSORINEURAL HEARING LOSS (SNHL) OF BOTH EARS: Primary | ICD-10-CM

## 2024-10-15 DIAGNOSIS — J30.0 VASOMOTOR RHINITIS: ICD-10-CM

## 2024-10-15 PROCEDURE — 99214 OFFICE O/P EST MOD 30 MIN: CPT | Mod: S$GLB,,, | Performed by: STUDENT IN AN ORGANIZED HEALTH CARE EDUCATION/TRAINING PROGRAM

## 2024-10-15 RX ORDER — IPRATROPIUM BROMIDE 21 UG/1
2 SPRAY, METERED NASAL 2 TIMES DAILY
Qty: 30 ML | Refills: 5 | Status: SHIPPED | OUTPATIENT
Start: 2024-10-15

## 2024-10-15 NOTE — PROGRESS NOTES
Ear, Nose, & Throat  Otolaryngology - Head & Neck Surgery      Subjective:     Chief Complaint:   Chief Complaint   Patient presents with    Ear Fullness       Cecilia Munguia is a 79 y.o. female who was self-referred for buzzing tinnitus.    Patient has a long documented history of sensorineural hearing loss.  Wears hearing aids regularly.  She has noted that over the last several months she has had nonpulsatile tinnitus which has become disruptive to her sleep.  She has a history of bilateral nonpulsatile tinnitus.  She recently had a hearing test at an outside audiologist, and reportedly had no large change in hearing.     She also reports daily bilateral rhinorrhea.  She has not have typical allergy symptoms such as sneezing and ocular pruritus.  She has not have a lifetime history of allergies.  She is not currently taking anything for this.    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    Sensorineural hearing loss 03/03/2016    Generalized anxiety disorder 03/20/2018    Urinary incontinence, mixed 01/27/2019    Vaginal atrophy 01/27/2019    Irregular bowel habits 01/27/2019    Chronic RUQ pain 01/27/2019    Situational stress 01/27/2019    Pelvic floor dysfunction 02/22/2019    Osteoarthritis of right hip 03/11/2019    Hearing aid worn 09/06/2019    Nasal septal deviation 09/06/2019    Allergic rhinitis 09/06/2019    Vertigo 10/01/2019    Imbalance 10/08/2019    Ataxia 10/08/2019    Osteoporosis 08/03/2021    Vitamin D deficiency 08/03/2021    Acute appendicitis 10/22/2021    Abdominal pain 10/22/2021    Range of motion deficit 03/22/2022    Weakness of both hands 03/22/2022    Depression 07/24/2024     Resolved Ambulatory Problems     Diagnosis Date Noted    Osteopenia 01/27/2019     Osteoarthritis of right hip 2019    Epistaxis 2019    Disease of gallbladder 2020    Primary osteoarthritis of left hip 05/10/2021     Past Medical History:   Diagnosis Date    Anxiety     Balance problem     Chronic inflammatory demyelinating neuropathy     Diarrhea     Encounter for blood transfusion     [place] (hard of hearing)     Hyperlipidemia     Hypertension     IBS (irritable bowel syndrome)     Neuromuscular disorder     Ulcer        Past Surgical History  She has a past surgical history that includes Cosmetic surgery;  section; Tonsillectomy; Ectopic pregnancy surgery; Breast surgery; Hip Arthroplasty (Right, 3/11/2019); Laparoscopic cholecystectomy (N/A, 2020); Joint replacement (Right, 2019); Hip Arthroplasty (Left, 5/10/2021); and Laparoscopic appendectomy (N/A, 10/22/2021).    Past Surgical History:   Procedure Laterality Date    BREAST SURGERY       SECTION      COSMETIC SURGERY      ECTOPIC PREGNANCY SURGERY      HIP ARTHROPLASTY Right 3/11/2019    Procedure: ARTHROPLASTY, HIP;  Surgeon: Deonte Lynn MD;  Location: [facility];  Service: Orthopedics;  [facility]: [facility]    HIP ARTHROPLASTY Left 5/10/2021    Procedure: ARTHROPLASTY, HIP;  Surgeon: Deonte Lynn MD;  Location: [facility];  Service: Orthopedics;  [facility]: [facility]  [facility]    JOINT REPLACEMENT Right 2019    hip    LAPAROSCOPIC APPENDECTOMY N/A 10/22/2021    Procedure: APPENDECTOMY, LAPAROSCOPIC;  Surgeon: Marco Antonio Avila Jr., MD;  Location: [facility];  Service: General;  Laterality: N/A;    LAPAROSCOPIC CHOLECYSTECTOMY N/A 2020    Procedure: CHOLECYSTECTOMY, LAPAROSCOPIC;  Surgeon: Jeff Roman MD;  Location: [facility];  Service: General;  Laterality: N/A;    TONSILLECTOMY          Family History  Her family history includes Diabetes in her brother and mother; Heart attack in her brother; Heart disease in her brother and father; Hypertension in her father.    Social History  She  "reports that she quit smoking about 39 years ago. Her smoking use included cigarettes. She has never used smokeless tobacco. She reports current alcohol use of about 6.0 - 7.0 standard drinks of alcohol per week. She reports that she does not use drugs.    Allergies  She is allergic to sulfa (sulfonamide antibiotics).    Medications  She has a current medication list which includes the following prescription(s): alclometasone, calcipotriene, colestipol, donepezil, ergocalciferol, famotidine, fluorouracil, hydrochlorothiazide, losartan, lumify, multivitamin, omeprazole, sertraline, gemtesa, and ipratropium.    ROS:  Pertinent positive and negative review of systems as noted in HPI.     Objective:     /73   Pulse 61   Ht 5' 6" (1.676 m)   Wt 64.1 kg (141 lb 5 oz)   BMI 22.81 kg/m²    Physical Exam  Constitutional: Well appearing, communicating. No acute distress  Voice: Euphonic  Eyes: Conjunctiva WNL, Pupils reactive  Head/Face: House [place]ack[place] I Bilaterally.  Right Ear:    Auricle normally developed   EAC: normal   Tympanic membrane: intact   Middle Ear: No effusion present and [place] in normal position  Left Ear:    Auricle normally developed   EAC: normal   Tympanic membrane: intact   Middle Ear: No effusion present and [place] in normal position  Nose:   Setpum midline   No inferior turbinate hypertrophy   Thin rhinorrhea present bilaterally  Vestibular:    Spontaneous Nystagmus: none  Neuro/Psychiatric:     Affect: Appropriate   Eyes: EOMI intact  Respiratory: No increased WOB, no stridor     Data Review:   LABS    IMAGING    No pertinent imaging available    AUDIO    not performed    Procedures:       Assessment:     1. Sensorineural hearing loss (SNHL) of both ears    2. Tinnitus of left ear    3. Vasomotor rhinitis        Plan:     I had a long discussion with the patient and her   regarding her condition and the further workup and management options.  We discussed the nature tinnitus as " it relates to hearing loss.  She is not interested in a hearing exam today since she previously had one done at outside audiologist.  I explained tinnitus using an analogy of phantom pains.  I explained to her that these are phantom noises in the setting of her nerve related hearing loss.  We briefly discussed masking techniques including the use of a sound machine or allow fan at night to sleep.  Her symptoms are mostly at nighttime.  We also discussed the use of CBT for tinnitus if this continues to be disruptive.    I will give her prescription for Atrovent for her vasomotor rhinitis.  She will follow up in my clinic as needed.    No orders of the defined types were placed in this encounter.     Medications Ordered This Encounter   Medications    ipratropium (ATROVENT) 21 mcg (0.03 %) nasal spray      Problem List Items Addressed This Visit          ENT    Sensorineural hearing loss - Primary     Other Visit Diagnoses       Tinnitus of left ear        Vasomotor rhinitis

## 2024-11-06 ENCOUNTER — OFFICE VISIT (OUTPATIENT)
Dept: NEUROLOGY | Facility: CLINIC | Age: 79
End: 2024-11-06
Payer: MEDICARE

## 2024-11-06 VITALS
BODY MASS INDEX: 23.57 KG/M2 | HEART RATE: 65 BPM | DIASTOLIC BLOOD PRESSURE: 72 MMHG | HEIGHT: 66 IN | SYSTOLIC BLOOD PRESSURE: 114 MMHG | WEIGHT: 146.63 LBS

## 2024-11-06 DIAGNOSIS — E78.2 MIXED HYPERLIPIDEMIA: Chronic | ICD-10-CM

## 2024-11-06 DIAGNOSIS — I10 ESSENTIAL HYPERTENSION: Primary | Chronic | ICD-10-CM

## 2024-11-06 DIAGNOSIS — F02.80 MIXED ALZHEIMER AND VASCULAR DEMENTIA: ICD-10-CM

## 2024-11-06 DIAGNOSIS — G30.9 MIXED ALZHEIMER AND VASCULAR DEMENTIA: ICD-10-CM

## 2024-11-06 DIAGNOSIS — F01.50 MIXED ALZHEIMER AND VASCULAR DEMENTIA: ICD-10-CM

## 2024-11-06 PROCEDURE — 99417 PROLNG OP E/M EACH 15 MIN: CPT | Mod: S$PBB,,, | Performed by: NURSE PRACTITIONER

## 2024-11-06 PROCEDURE — G2211 COMPLEX E/M VISIT ADD ON: HCPCS | Mod: S$PBB,,, | Performed by: NURSE PRACTITIONER

## 2024-11-06 PROCEDURE — 99999 PR PBB SHADOW E&M-EST. PATIENT-LVL III: CPT | Mod: PBBFAC,,, | Performed by: NURSE PRACTITIONER

## 2024-11-06 PROCEDURE — 99213 OFFICE O/P EST LOW 20 MIN: CPT | Mod: PBBFAC | Performed by: NURSE PRACTITIONER

## 2024-11-06 PROCEDURE — 99215 OFFICE O/P EST HI 40 MIN: CPT | Mod: S$PBB,,, | Performed by: NURSE PRACTITIONER

## 2024-11-06 NOTE — PROGRESS NOTES
Name: Cecilia Munguia  MRN: [id number]   CSN: [id number]      Date: 11-9-24      Referring physician:  No referring provider defined for this encounter.    Subjective:        Chief Complaint: memory loss     History of Present Illness (HPI):    Ceiclia Munguia is a 79 y.o. right-handed [place] female (primary language [place], lived in [place] 1986) to work for [employer]) with  HTN, HLD, CIDP, [place] (wears aids), & depression  presents today for a follow-up evaluation of  dementia, probable Alzheimer's dementia and vascular contributions (pTau 181->elevated at 2.04 & normal NFL-18.6 )  and is accompanied by son, Vincent,and . Last seen in office, 8-14-24 by Dr. Robertson. At that time, MoCA 12/30. She was weaned off bupropion and started on donepezil. Family has been in contact with [facility]ner. She is GUIDE eligible but  has declined thus far.       She is taking /tolerating donepezil. She is off bupropion.    thinks she is a lot better. He feels she mood is more stable. Sleeping better. Thinking better.   Still wakes up to go to bathroom but not having bad dreams like she once did.     Son feels there are times of day memory and flow of sentences may be better tan others. Days of week may be more difficult. Remembering dates or wherether her grand kids are in school or on holidays is difficult.     She is aware that she will at times start speaking [place]ian instead of English.   She offers that she is trilingual.    Son is concerned that she could get scammed. He talks about texts she was receiving during the recent election. She interjects immediately and says she was working for [employer]. Son clarifies that she was getting texts and filling out questionnaires on the text messages, which would then prompt her to donate, which she did often.   She talks at length about this and the importance of what she was doing and the pride she felt for helping his campaign.      iADLs  Driving:  She continues driving.   She does her shopping and goes to grocery.   They have had concerns about her driving. Son says there was a time they thought she got lost.     She does laundry.     Independent with feeding, dressing, toileting and bathing.       Mood:  Does have depression and anxiety but things generally better off bupropion.    Ambulation: No difficulty     Sleep: Improved               Past Medical History: The patient  has a past medical history of Anxiety, Balance problem, Chronic inflammatory demyelinating neuropathy, Depression, Diarrhea, Encounter for blood transfusion, GERD (gastroesophageal reflux disease), [place] (hard of hearing), Hyperlipidemia, Hypertension, IBS (irritable bowel syndrome), Neuromuscular disorder, and Ulcer.    Social History: The patient  reports that she quit smoking about 39 years ago. Her smoking use included cigarettes. She has never used smokeless tobacco. She reports current alcohol use of about 6.0 - 7.0 standard drinks of alcohol per week. She reports that she does not use drugs.    Family History: Their family history includes Diabetes in her brother and mother; Heart attack in her brother; Heart disease in her brother and father; Hypertension in her father.    Allergies: Sulfa (sulfonamide antibiotics)     Meds:   Current Outpatient Medications on File Prior to Visit   Medication Sig Dispense Refill    colestipoL (COLESTID) 1 gram Tab Take 1 g by mouth every evening.      donepeziL (ARICEPT) 10 MG tablet Take 1 tablet (10 mg total) by mouth every evening. Take 1/2 tablet for the first month. 30 tablet 11    ergocalciferol (VITAMIN D2) 50,000 unit Cap Take 1 capsule (50,000 Units total) by mouth every 7 days. 12 capsule 3    hydroCHLOROthiazide (HYDRODIURIL) 12.5 MG Tab TAKE 1 TABLET BY MOUTH EVERY DAY 90 tablet 3    losartan (COZAAR) 50 MG tablet TAKE 1 TABLET BY MOUTH EVERY DAY 90 tablet 3    LUMIFY 0.025 % Drop SMARTSI Drop(s) In Eye(s) PRN    "   multivitamin (THERAGRAN) per tablet Take 1 tablet by mouth once daily.      omeprazole (PRILOSEC) 20 MG capsule Take 20 mg by mouth Daily.       sertraline (ZOLOFT) 100 MG tablet TAKE 1 TABLET BY MOUTH EVERY DAY 90 tablet 3    vibegron (GEMTESA) 75 mg Tab Take 1 tablet by mouth Daily.      alclometasone (ACLOVATE) 0.05 % cream  (Patient not taking: Reported on 11/6/2024)      calcipotriene (DOVONOX) 0.005 % cream 2 (two) times daily. (Patient not taking: Reported on 11/6/2024)      famotidine (PEPCID) 40 MG tablet Take 40 mg by mouth every evening. (Patient not taking: Reported on 11/6/2024)      fluorouraciL (EFUDEX) 5 % cream SMARTSIG:Sparingly Topical Daily (Patient not taking: Reported on 11/6/2024)      ipratropium (ATROVENT) 21 mcg (0.03 %) nasal spray 2 sprays by Each Nostril route 2 (two) times daily. (Patient not taking: Reported on 11/6/2024) 30 mL 5     No current facility-administered medications on file prior to visit.       Objective:     Physical Exam:    Vitals:    11/06/24 1410   BP: 114/72   BP Location: Left arm   Patient Position: Sitting   Pulse: 65   Weight: 66.5 kg (146 lb 9.7 oz)   Height: 5' 6" (1.676 m)     Body mass index is 23.66 kg/m².    Constitutional  Well-developed, well-nourished, appears stated age     ..  Neurological    * Mental status     - Orientation Oriented to: person and situation  Did not otherwise question.      - Memory     Impaired     - Attention/concentration  Easily distracted and difficult to redirect at times     - Language  Spontaneous   Will be speaking English and then switch to Portugese at times. This seems to be more notable if she becomes defensive.      - Fund of knowledge  Aware of current events     - Executive  Impaired     She frequently drives the conversation and will get defensive if they give any difference of opinion.                 - Independent visualization and interpretation of radiological images: sadie read: scattered SVID, atrophy " global but prominent temporal lobes         Imaging:   Results for orders placed or performed during the hospital encounter of 08/06/24   MRI Brain Without Contrast    Narrative    EXAMINATION:  MRI BRAIN WITHOUT CONTRAST    CLINICAL HISTORY:  Memory loss; Other amnesia cognitive change.    TECHNIQUE:  Multiplanar multisequence MR imaging of the brain was performed without contrast.    COMPARISON:  None.    FINDINGS:  There is no evidence of hydrocephalus mass effect intracranial hemorrhage or acute infarct.    There is volume loss that is diffuse in nature with an element of hippocampal volume loss also identified.    Scattered foci of increased signal are noted within the periventricular and subcortical white matter and edwardo that may reflect mild chronic small vessel ischemic change.  No evidence of prior cortical/territorial infarct.    No radiograph evidence of amyloid angiopathy.    Normal arterial flow voids are preserved at the skull base.    The visualized sinuses and mastoid air cells are clear.      Impression    No acute intracranial process.    Diffuse volume loss.  No advanced chronic ischemic changes.      Electronically signed by: Kapil Boland  Date:    08/06/2024  Time:    17:37           Assessment and Plan     Essential hypertension    Mixed Alzheimer and vascular dementia    Mixed hyperlipidemia        Medical Decision Making:    She will stay off bupropion.  Continue donepezil 10 mg daily.     I have concerns about her driving. I have recommended that she cease driving. Discussed with them. Rev'd trails on MoCA. Explained while this is not a driving evaluation, it does give indication that she may be higher risk of accident. In addition to increased risk of accident or getting lost, I have discussed the potential liability of allowing her to continue to drive. Discussed option of driving evaluation if they disagree. Explained the process thru [facility]. Happy to order if they would like.      Discussed GUIDE. I do recommend and think they would benefit. Son acknowledges he has has many questions and would like to learn as much as possible. He is interested in support groups and all info possible. They have the tow dementia books given at last visit.  will share with son.     Discussed behavior strategies with them.     I will ask Martha Bonner LMSW to reach out to Vincent.    is not ready for additional services/support at this time.      Discussed option of 6 or 12 mo Follow up. They prefer 6 mo.       ..Total time: 73 minutes spent on the encounter, which includes face to face time and non-face to face time preparing to see the patient (eg, review of tests), Obtaining and/or reviewing separately obtained history, Documenting clinical information in the electronic or other health record, Independently interpreting results (not separately reported) and communicating results to the patient/family/caregiver, or Care coordination (not separately reported).       ..      Tiffanie Shipley, JERRICA, NP-C  Division of Movement and Memory Disorders  [facility]  [phone]

## 2024-11-09 PROBLEM — F01.50 MIXED ALZHEIMER AND VASCULAR DEMENTIA: Status: ACTIVE | Noted: 2024-11-09

## 2024-11-09 PROBLEM — F02.80 MIXED ALZHEIMER AND VASCULAR DEMENTIA: Status: ACTIVE | Noted: 2024-11-09

## 2024-11-09 PROBLEM — G30.9 MIXED ALZHEIMER AND VASCULAR DEMENTIA: Status: ACTIVE | Noted: 2024-11-09

## 2024-11-11 ENCOUNTER — TELEPHONE (OUTPATIENT)
Dept: NEUROLOGY | Facility: CLINIC | Age: 79
End: 2024-11-11
Payer: MEDICARE

## 2024-11-11 NOTE — TELEPHONE ENCOUNTER
Sw received note from provider that pt's son is requesting pc from sw to discuss potential resources for dementia caregiver support.   [phone]

## 2024-11-13 NOTE — TELEPHONE ENCOUNTER
Sw spoke to pt's son regarding questions about dementia resources.   Rosi provided flyers for local support groups via email ([email or web address]) and discussed adult day programs, sw included information re: [facility] and [facility].  Rosi and Mika will f/u on Dec 5 @ 10am to discuss additional questions.

## 2024-11-16 DIAGNOSIS — I10 ESSENTIAL HYPERTENSION: Chronic | ICD-10-CM

## 2024-11-18 RX ORDER — LOSARTAN POTASSIUM 50 MG/1
TABLET ORAL
Qty: 90 TABLET | Refills: 3 | Status: SHIPPED | OUTPATIENT
Start: 2024-11-18

## 2024-12-05 ENCOUNTER — TELEPHONE (OUTPATIENT)
Dept: NEUROLOGY | Facility: CLINIC | Age: 79
End: 2024-12-05
Payer: MEDICARE

## 2024-12-05 NOTE — TELEPHONE ENCOUNTER
Sw spoke to pt's son, Mika. Pt is in the [place] of [place] [facility] He is hoping that the second opinion will help convince his dad that the diagnosis is real and they will need more help.   Sw and mika discussed any behavioral concerns. He shared that she has become more frustrated and confused as this goes on. Sw recommended strategies to help manage these behavioral changes (I.e., reassurance, redirection, reflection, retreat; mindfulness; etc.).   Mika is hoping that he can get his dad to a support group and will follow up when they need more information.

## 2025-01-14 RX ORDER — HYDROCHLOROTHIAZIDE 12.5 MG/1
TABLET ORAL
Qty: 90 TABLET | Refills: 3 | Status: SHIPPED | OUTPATIENT
Start: 2025-01-14

## (undated) DEVICE — TROCAR ENDOPATH XCEL 11MM 10CM

## (undated) DEVICE — DRAPE STERI U-SHAPED 47X51IN

## (undated) DEVICE — SEE MEDLINE ITEM 153151

## (undated) DEVICE — CATH IV CATHLON W/HUB14GAX

## (undated) DEVICE — ELECTRODE REM PLYHSV RETURN 9

## (undated) DEVICE — SUT VICRYL+ 1 CTX 18IN VIOL

## (undated) DEVICE — ADHESIVE DERMABOND ADVANCED

## (undated) DEVICE — CONTAINER SPECIMEN STRL 4OZ

## (undated) DEVICE — SOL 9P NACL IRR PIC IL

## (undated) DEVICE — ALCOHOL ISOPROPYL BLU 70% 16OZ

## (undated) DEVICE — ALCOHOL 70% ISOP W/GREEN 16OZ

## (undated) DEVICE — SUT MCRYL PLUS 4-0 PS2 27IN

## (undated) DEVICE — CLOSURE SKIN STERI STRIP 1/2X4

## (undated) DEVICE — UNDERGLOVES BIOGEL PI SIZE 8

## (undated) DEVICE — SHEET HIP ABSORB CIRC ELAS 8

## (undated) DEVICE — DRESSING TRANS 4X10 TEGADERM

## (undated) DEVICE — SOL CLEARIFY VISUALIZATION LAP

## (undated) DEVICE — SYR 10CC LUER LOCK

## (undated) DEVICE — DRESSING AQUACEL AG 3.5X10IN

## (undated) DEVICE — SUT VICRYL+ 27 UR-6 VIOL

## (undated) DEVICE — STAPLER SKIN PROXIMATE WIDE

## (undated) DEVICE — TROCAR ENDO KII FIOS 12X100MM

## (undated) DEVICE — SEE MEDLINE ITEM 157117

## (undated) DEVICE — BLADE SAG DUAL 18MMX1.27MMX90M

## (undated) DEVICE — PAD UNDERPAD 30X30

## (undated) DEVICE — NDL 21GA X1 1/2 REG BEVEL

## (undated) DEVICE — CANNULA ENDOPATH XCEL 5X100MM

## (undated) DEVICE — COVER MAYO STAND REINFRCD 30

## (undated) DEVICE — SPONGE LAP 18X18 PREWASHED

## (undated) DEVICE — POSITIONER IV ARMBOARD FOAM

## (undated) DEVICE — UNDERGLOVES BIOGEL PI SIZE 8.5

## (undated) DEVICE — GLOVE BIOGEL SKINSENSE PI 7.0

## (undated) DEVICE — SUT VICRYL PLUS 2-0 CT1 18

## (undated) DEVICE — GLOVE BIOGEL SKINSENSE PI 7.5

## (undated) DEVICE — NDL HYPO REG 25G X 1 1/2

## (undated) DEVICE — UNDERGLOVES BIOGEL PI SZ 7 LF

## (undated) DEVICE — UNDERGLOVE BIOGEL PI SZ 6.5 LF

## (undated) DEVICE — DRESSING AQUACEL ADH 4X10IN

## (undated) DEVICE — SEE MEDLINE ITEM 157131

## (undated) DEVICE — SHEARS HARMONIC 36CM HD 1000I

## (undated) DEVICE — SUT VICRYL 0 27 CT-2

## (undated) DEVICE — SPONGE GAUZE 16PLY 4X4

## (undated) DEVICE — COVER BACK TABLE 72X21

## (undated) DEVICE — PILLOW ABDUCTION MED

## (undated) DEVICE — DRAPE STERI INSTRUMENT 1018

## (undated) DEVICE — APPLICATOR CHLORAPREP ORN 26ML

## (undated) DEVICE — GLOVE BIOGEL SKINSENSE PI 8.0

## (undated) DEVICE — PENCIL ELECTROSURG HOLST W/BLD

## (undated) DEVICE — SUT STRATAFIX PDS 1 CTX 18IN

## (undated) DEVICE — TRAY FOLEY 16FR INFECTION CONT

## (undated) DEVICE — BLANKET HYPER ADULT 24X60IN

## (undated) DEVICE — IRRIGATOR ENDOSCOPY DISP.

## (undated) DEVICE — TROCAR KII FIOS 5MM X 100MM

## (undated) DEVICE — PULSAVAC ZIMMER

## (undated) DEVICE — STAPLER INT LINEAR ARTC 3.5-45

## (undated) DEVICE — BAG TISSUE RETRIEVAL 225ML

## (undated) DEVICE — NDL SAFETY 22G X 1.5 ECLIPSE

## (undated) DEVICE — DRAPE CAMERA/VIDEO 5 X 96

## (undated) DEVICE — NDL INSUF ULTRA VERESS 120MM

## (undated) DEVICE — SUT VICRYL PLUS 4-0 P3 18IN

## (undated) DEVICE — Device

## (undated) DEVICE — SEE MEDLINE ITEM 156925

## (undated) DEVICE — BANDAGE ADHESIVE

## (undated) DEVICE — DRAPE INCISE IOBAN 2 23X17IN

## (undated) DEVICE — KIT BONE CEMENT PREPARATION

## (undated) DEVICE — SUT ETHIBOND EXCEL 5-0 V-40

## (undated) DEVICE — MASK FLYTE HOOD PEEL AWAY

## (undated) DEVICE — DRESSING LEUKOPLAST FLEX 1X3IN

## (undated) DEVICE — SOL IRR NACL .9% 3000ML

## (undated) DEVICE — CART STAPLE FLEX ETX 3.5MM BLU

## (undated) DEVICE — GLOVE BIOGEL SKINSENSE PI 8.5

## (undated) DEVICE — SOL NS 1000CC

## (undated) DEVICE — UNDERGLOVES BIOGEL PI SIZE 7.5

## (undated) DEVICE — SOL PVP-I SCRUB 7.5% 4OZ

## (undated) DEVICE — SYS SEE SHARP SCOPE ANTIFOG

## (undated) DEVICE — SYR 30CC LUER LOCK

## (undated) DEVICE — TROCAR ENDOPATH XCEL 5X100MM

## (undated) DEVICE — APPLIER CLIP ENDO LIGAMAX 5MM